# Patient Record
Sex: FEMALE | Race: WHITE | NOT HISPANIC OR LATINO
[De-identification: names, ages, dates, MRNs, and addresses within clinical notes are randomized per-mention and may not be internally consistent; named-entity substitution may affect disease eponyms.]

---

## 2017-01-05 ENCOUNTER — RX RENEWAL (OUTPATIENT)
Age: 62
End: 2017-01-05

## 2017-02-06 ENCOUNTER — TRANSCRIPTION ENCOUNTER (OUTPATIENT)
Age: 62
End: 2017-02-06

## 2017-03-20 ENCOUNTER — APPOINTMENT (OUTPATIENT)
Dept: FAMILY MEDICINE | Facility: CLINIC | Age: 62
End: 2017-03-20

## 2017-03-20 VITALS
SYSTOLIC BLOOD PRESSURE: 130 MMHG | DIASTOLIC BLOOD PRESSURE: 80 MMHG | BODY MASS INDEX: 30.73 KG/M2 | HEIGHT: 62 IN | HEART RATE: 72 BPM | WEIGHT: 167 LBS

## 2017-03-20 DIAGNOSIS — Z23 ENCOUNTER FOR IMMUNIZATION: ICD-10-CM

## 2017-03-20 DIAGNOSIS — Z01.419 ENCOUNTER FOR GYNECOLOGICAL EXAMINATION (GENERAL) (ROUTINE) W/OUT ABNORMAL FINDINGS: ICD-10-CM

## 2017-03-20 DIAGNOSIS — Z12.4 ENCOUNTER FOR GYNECOLOGICAL EXAMINATION (GENERAL) (ROUTINE) W/OUT ABNORMAL FINDINGS: ICD-10-CM

## 2017-03-20 DIAGNOSIS — R89.9 UNSPECIFIED ABNORMAL FINDING IN SPECIMENS FROM OTHER ORGANS, SYSTEMS AND TISSUES: ICD-10-CM

## 2017-07-05 ENCOUNTER — RX RENEWAL (OUTPATIENT)
Age: 62
End: 2017-07-05

## 2017-12-04 ENCOUNTER — RX RENEWAL (OUTPATIENT)
Age: 62
End: 2017-12-04

## 2018-01-01 ENCOUNTER — RX RENEWAL (OUTPATIENT)
Age: 63
End: 2018-01-01

## 2018-02-01 ENCOUNTER — APPOINTMENT (OUTPATIENT)
Dept: FAMILY MEDICINE | Facility: CLINIC | Age: 63
End: 2018-02-01
Payer: COMMERCIAL

## 2018-02-01 VITALS
SYSTOLIC BLOOD PRESSURE: 140 MMHG | HEIGHT: 62 IN | DIASTOLIC BLOOD PRESSURE: 90 MMHG | WEIGHT: 171.25 LBS | HEART RATE: 80 BPM | BODY MASS INDEX: 31.51 KG/M2

## 2018-02-01 PROCEDURE — 99396 PREV VISIT EST AGE 40-64: CPT

## 2018-02-01 RX ORDER — IBUPROFEN 600 MG/1
600 TABLET, FILM COATED ORAL 3 TIMES DAILY
Qty: 1 | Refills: 1 | Status: DISCONTINUED | COMMUNITY
Start: 2017-03-20 | End: 2018-02-01

## 2018-02-07 RX ORDER — ESOMEPRAZOLE MAGNESIUM 40 MG/1
40 CAPSULE, DELAYED RELEASE ORAL
Qty: 90 | Refills: 2 | Status: DISCONTINUED | COMMUNITY
Start: 2017-12-04 | End: 2018-02-07

## 2018-02-20 LAB
ALBUMIN SERPL ELPH-MCNC: 4.2 G/DL
ALP BLD-CCNC: 68 U/L
ALT SERPL-CCNC: 22 U/L
ANION GAP SERPL CALC-SCNC: 13 MMOL/L
APPEARANCE: CLEAR
AST SERPL-CCNC: 23 U/L
BASOPHILS # BLD AUTO: 0.03 K/UL
BASOPHILS NFR BLD AUTO: 0.5 %
BILIRUB SERPL-MCNC: 0.3 MG/DL
BILIRUBIN URINE: NEGATIVE
BLOOD URINE: NEGATIVE
BUN SERPL-MCNC: 17 MG/DL
CALCIUM SERPL-MCNC: 8.9 MG/DL
CHLORIDE SERPL-SCNC: 106 MMOL/L
CHOLEST SERPL-MCNC: 200 MG/DL
CHOLEST/HDLC SERPL: 3.7 RATIO
CO2 SERPL-SCNC: 25 MMOL/L
COLOR: YELLOW
CREAT SERPL-MCNC: 0.61 MG/DL
EOSINOPHIL # BLD AUTO: 0.12 K/UL
EOSINOPHIL NFR BLD AUTO: 2.2 %
GLUCOSE QUALITATIVE U: NEGATIVE MG/DL
GLUCOSE SERPL-MCNC: 106 MG/DL
HBA1C MFR BLD HPLC: 5.5 %
HCT VFR BLD CALC: 42.7 %
HDLC SERPL-MCNC: 54 MG/DL
HGB BLD-MCNC: 13.9 G/DL
IMM GRANULOCYTES NFR BLD AUTO: 0 %
KETONES URINE: NEGATIVE
LDLC SERPL CALC-MCNC: 127 MG/DL
LEUKOCYTE ESTERASE URINE: NEGATIVE
LYMPHOCYTES # BLD AUTO: 1.61 K/UL
LYMPHOCYTES NFR BLD AUTO: 29.4 %
MAN DIFF?: NORMAL
MCHC RBC-ENTMCNC: 29.1 PG
MCHC RBC-ENTMCNC: 32.6 GM/DL
MCV RBC AUTO: 89.5 FL
MONOCYTES # BLD AUTO: 0.64 K/UL
MONOCYTES NFR BLD AUTO: 11.7 %
NEUTROPHILS # BLD AUTO: 3.08 K/UL
NEUTROPHILS NFR BLD AUTO: 56.2 %
NITRITE URINE: NEGATIVE
PH URINE: 7.5
PLATELET # BLD AUTO: 213 K/UL
POTASSIUM SERPL-SCNC: 4.6 MMOL/L
PROT SERPL-MCNC: 6.7 G/DL
PROTEIN URINE: NEGATIVE MG/DL
RBC # BLD: 4.77 M/UL
RBC # FLD: 13.2 %
SODIUM SERPL-SCNC: 144 MMOL/L
SPECIFIC GRAVITY URINE: 1.02
TRIGL SERPL-MCNC: 96 MG/DL
TSH SERPL-ACNC: 1.37 UIU/ML
UROBILINOGEN URINE: NEGATIVE MG/DL
WBC # FLD AUTO: 5.48 K/UL

## 2018-03-09 ENCOUNTER — APPOINTMENT (OUTPATIENT)
Dept: FAMILY MEDICINE | Facility: CLINIC | Age: 63
End: 2018-03-09
Payer: COMMERCIAL

## 2018-03-09 VITALS
OXYGEN SATURATION: 98 % | HEIGHT: 62 IN | TEMPERATURE: 99 F | WEIGHT: 167.38 LBS | BODY MASS INDEX: 30.8 KG/M2 | HEART RATE: 80 BPM | DIASTOLIC BLOOD PRESSURE: 70 MMHG | SYSTOLIC BLOOD PRESSURE: 110 MMHG

## 2018-03-09 DIAGNOSIS — H91.93 UNSPECIFIED HEARING LOSS, BILATERAL: ICD-10-CM

## 2018-03-09 PROCEDURE — 99215 OFFICE O/P EST HI 40 MIN: CPT

## 2018-03-16 ENCOUNTER — APPOINTMENT (OUTPATIENT)
Dept: FAMILY MEDICINE | Facility: CLINIC | Age: 63
End: 2018-03-16
Payer: COMMERCIAL

## 2018-03-30 ENCOUNTER — NON-APPOINTMENT (OUTPATIENT)
Age: 63
End: 2018-03-30

## 2018-03-30 ENCOUNTER — APPOINTMENT (OUTPATIENT)
Dept: FAMILY MEDICINE | Facility: CLINIC | Age: 63
End: 2018-03-30
Payer: COMMERCIAL

## 2018-03-30 VITALS
WEIGHT: 170.13 LBS | DIASTOLIC BLOOD PRESSURE: 82 MMHG | HEIGHT: 62 IN | BODY MASS INDEX: 31.31 KG/M2 | HEART RATE: 71 BPM | SYSTOLIC BLOOD PRESSURE: 140 MMHG

## 2018-03-30 PROCEDURE — 99215 OFFICE O/P EST HI 40 MIN: CPT | Mod: 25

## 2018-03-30 PROCEDURE — 93000 ELECTROCARDIOGRAM COMPLETE: CPT

## 2018-05-03 ENCOUNTER — RX RENEWAL (OUTPATIENT)
Age: 63
End: 2018-05-03

## 2018-09-07 ENCOUNTER — MEDICATION RENEWAL (OUTPATIENT)
Age: 63
End: 2018-09-07

## 2018-11-21 ENCOUNTER — APPOINTMENT (OUTPATIENT)
Dept: FAMILY MEDICINE | Facility: CLINIC | Age: 63
End: 2018-11-21
Payer: COMMERCIAL

## 2018-11-21 VITALS
WEIGHT: 169 LBS | HEIGHT: 62 IN | HEART RATE: 63 BPM | BODY MASS INDEX: 31.1 KG/M2 | DIASTOLIC BLOOD PRESSURE: 70 MMHG | SYSTOLIC BLOOD PRESSURE: 145 MMHG | OXYGEN SATURATION: 98 %

## 2018-11-21 PROCEDURE — 99213 OFFICE O/P EST LOW 20 MIN: CPT

## 2018-11-25 NOTE — HISTORY OF PRESENT ILLNESS
[FreeTextEntry1] : need MCA for ovaries removal sec to + genetic testing [de-identified] : has no new complaints\par chart reviewed \par patient has not done preop\par H/O HTN doesnot see any cardio

## 2018-11-27 ENCOUNTER — RX RENEWAL (OUTPATIENT)
Age: 63
End: 2018-11-27

## 2018-11-28 ENCOUNTER — APPOINTMENT (OUTPATIENT)
Dept: FAMILY MEDICINE | Facility: CLINIC | Age: 63
End: 2018-11-28
Payer: COMMERCIAL

## 2018-11-28 ENCOUNTER — RX RENEWAL (OUTPATIENT)
Age: 63
End: 2018-11-28

## 2018-11-28 VITALS
SYSTOLIC BLOOD PRESSURE: 122 MMHG | OXYGEN SATURATION: 98 % | BODY MASS INDEX: 31.72 KG/M2 | HEART RATE: 64 BPM | HEIGHT: 61 IN | WEIGHT: 168 LBS | DIASTOLIC BLOOD PRESSURE: 84 MMHG

## 2018-11-28 PROCEDURE — 99215 OFFICE O/P EST HI 40 MIN: CPT

## 2018-11-28 NOTE — ASSESSMENT
[Patient Optimized for Surgery] : Patient optimized for surgery [No Further Testing Recommended] : no further testing recommended [Continue medications as is] : Continue current medications [As per surgery] : as per surgery [FreeTextEntry4] : patient is stable with HTN/Hyperlipedemia

## 2018-11-28 NOTE — HISTORY OF PRESENT ILLNESS
[No Pertinent Cardiac History] : no history of aortic stenosis, atrial fibrillation, coronary artery disease, recent myocardial infarction, or implantable device/pacemaker [No Pertinent Pulmonary History] : no history of asthma, COPD, sleep apnea, or smoking [No Adverse Anesthesia Reaction] : no adverse anesthesia reaction in self or family member [Chronic Anticoagulation] : no chronic anticoagulation [Chronic Kidney Disease] : no chronic kidney disease [Diabetes] : no diabetes [(Patient denies any chest pain, claudication, dyspnea on exertion, orthopnea, palpitations or syncope)] : Patient denies any chest pain, claudication, dyspnea on exertion, orthopnea, palpitations or syncope [FreeTextEntry1] : elective oppherectomy and salpigectomy [FreeTextEntry2] : 12/04/2018 [FreeTextEntry4] : H/O HTN/ Hyperlipidemia controlled\par Depression Under control\par going for elective oophorectomy and salpingectomy

## 2019-02-16 ENCOUNTER — RX RENEWAL (OUTPATIENT)
Age: 64
End: 2019-02-16

## 2019-04-05 ENCOUNTER — MEDICATION RENEWAL (OUTPATIENT)
Age: 64
End: 2019-04-05

## 2019-04-05 ENCOUNTER — APPOINTMENT (OUTPATIENT)
Dept: FAMILY MEDICINE | Facility: CLINIC | Age: 64
End: 2019-04-05
Payer: COMMERCIAL

## 2019-04-05 VITALS
DIASTOLIC BLOOD PRESSURE: 62 MMHG | SYSTOLIC BLOOD PRESSURE: 118 MMHG | HEART RATE: 68 BPM | HEIGHT: 61 IN | TEMPERATURE: 98 F | OXYGEN SATURATION: 98 % | BODY MASS INDEX: 31.72 KG/M2 | RESPIRATION RATE: 16 BRPM | WEIGHT: 168 LBS

## 2019-04-05 PROCEDURE — 36415 COLL VENOUS BLD VENIPUNCTURE: CPT

## 2019-04-05 PROCEDURE — 99214 OFFICE O/P EST MOD 30 MIN: CPT | Mod: 25

## 2019-04-05 PROCEDURE — 99396 PREV VISIT EST AGE 40-64: CPT | Mod: 25

## 2019-04-05 RX ORDER — POLYETHYLENE GLYCOL 3350, SODIUM SULFATE, SODIUM CHLORIDE, POTASSIUM CHLORIDE, ASCORBIC ACID, SODIUM ASCORBATE 7.5-2.691G
100 KIT ORAL
Qty: 1 | Refills: 0 | Status: DISCONTINUED | COMMUNITY
Start: 2018-02-26 | End: 2019-04-05

## 2019-04-05 RX ORDER — AMOXICILLIN AND CLAVULANATE POTASSIUM 875; 125 MG/1; MG/1
875-125 TABLET, COATED ORAL
Qty: 20 | Refills: 0 | Status: DISCONTINUED | COMMUNITY
Start: 2018-03-09 | End: 2019-04-05

## 2019-04-05 NOTE — PAST MEDICAL HISTORY
[Postmenopausal] : history of menopause having occurred [Menopause Age____] : age at menopause was [unfilled] [Total Preg ___] : G: [unfilled] [Live Births___] : P: [unfilled] [AB Spont ___] : miscarriage(s): [unfilled]

## 2019-04-05 NOTE — REASON FOR VISIT
[CPE] : a comprehensive physical exam [Initial Eval - Existing Diagnosis] : an initial evaluation of an existing diagnosis

## 2019-04-09 ENCOUNTER — TRANSCRIPTION ENCOUNTER (OUTPATIENT)
Age: 64
End: 2019-04-09

## 2019-04-09 LAB
ALBUMIN SERPL ELPH-MCNC: 4.5 G/DL
ALP BLD-CCNC: 71 U/L
ALT SERPL-CCNC: 20 U/L
ANION GAP SERPL CALC-SCNC: 9 MMOL/L
AST SERPL-CCNC: 16 U/L
BASOPHILS # BLD AUTO: 0.06 K/UL
BASOPHILS NFR BLD AUTO: 0.8 %
BILIRUB SERPL-MCNC: 0.2 MG/DL
BUN SERPL-MCNC: 18 MG/DL
CALCIUM SERPL-MCNC: 9.1 MG/DL
CHLORIDE SERPL-SCNC: 103 MMOL/L
CHOLEST SERPL-MCNC: 224 MG/DL
CHOLEST/HDLC SERPL: 4.6 RATIO
CO2 SERPL-SCNC: 27 MMOL/L
CREAT SERPL-MCNC: 0.98 MG/DL
EOSINOPHIL # BLD AUTO: 0.14 K/UL
EOSINOPHIL NFR BLD AUTO: 1.8 %
ESTIMATED AVERAGE GLUCOSE: 117 MG/DL
GLUCOSE SERPL-MCNC: 117 MG/DL
HBA1C MFR BLD HPLC: 5.7 %
HCT VFR BLD CALC: 42.6 %
HDLC SERPL-MCNC: 49 MG/DL
HGB BLD-MCNC: 13.6 G/DL
IMM GRANULOCYTES NFR BLD AUTO: 0.1 %
LDLC SERPL CALC-MCNC: 147 MG/DL
LYMPHOCYTES # BLD AUTO: 1.8 K/UL
LYMPHOCYTES NFR BLD AUTO: 23.5 %
MAN DIFF?: NORMAL
MCHC RBC-ENTMCNC: 29.3 PG
MCHC RBC-ENTMCNC: 31.9 GM/DL
MCV RBC AUTO: 91.8 FL
MONOCYTES # BLD AUTO: 0.62 K/UL
MONOCYTES NFR BLD AUTO: 8.1 %
NEUTROPHILS # BLD AUTO: 5.04 K/UL
NEUTROPHILS NFR BLD AUTO: 65.7 %
PLATELET # BLD AUTO: 217 K/UL
POTASSIUM SERPL-SCNC: 4.5 MMOL/L
PROT SERPL-MCNC: 6.7 G/DL
RBC # BLD: 4.64 M/UL
RBC # FLD: 12.4 %
SODIUM SERPL-SCNC: 139 MMOL/L
TRIGL SERPL-MCNC: 139 MG/DL
TSH SERPL-ACNC: 1.72 UIU/ML
WBC # FLD AUTO: 7.67 K/UL

## 2019-04-10 NOTE — PHYSICAL EXAM
[General Appearance - Alert] : alert [Sclera] : the sclera and conjunctiva were normal [Oropharynx] : the oropharynx was normal [Thyroid Diffuse Enlargement] : the thyroid was not enlarged [Respiration, Rhythm And Depth] : normal respiratory rhythm and effort [Auscultation Breath Sounds / Voice Sounds] : lungs were clear to auscultation bilaterally [Heart Rate And Rhythm] : heart rate was normal and rhythm regular [Heart Sounds] : normal S1 and S2 [Full Pulse] : the pedal pulses are present [Edema] : there was no peripheral edema [Veins - Varicosity Changes] : there were no varicosital changes [Abdomen Soft] : soft [Abdomen Tenderness] : non-tender [Cervical Lymph Nodes Enlarged Posterior Bilaterally] : posterior cervical [Cervical Lymph Nodes Enlarged Anterior Bilaterally] : anterior cervical [Supraclavicular Lymph Nodes Enlarged Bilaterally] : supraclavicular [No Spinal Tenderness] : no spinal tenderness [Abnormal Walk] : normal gait [Involuntary Movements] : no involuntary movements were seen [Musculoskeletal - Swelling] : no joint swelling seen [Skin Turgor] : normal skin turgor [] : no rash [Deep Tendon Reflexes (DTR)] : deep tendon reflexes were 2+ and symmetric [No Focal Deficits] : no focal deficits [Oriented To Time, Place, And Person] : oriented to person, place, and time [FreeTextEntry1] : no limited ROM

## 2019-04-10 NOTE — ASSESSMENT
[Obese (BMI >29.9)] : Obese - BMI >29.9 [Weight loss counseling given] : Weight loss counseling given [150 min/wk aerobic activity @ 60% MHR recommended] : 150 min/wk aerobic activity @ 60% MHR recommended [Mediterranean diet recommended] : Mediterranean diet recommended [Non - Smoker] : non-smoker [FreeTextEntry2] : she tries to go to GYM and strategies to achieve in the wrkplace reviewed.  [FreeTextEntry1] :  Well exam for 63   year old WF with PMH as stated in HPI / active list. \par \par Management : \par \par Advised IBUPROFEN TID with food for 3 days ; if back pain improving, continue for up to onw [place].\par Stretching and moist heat. \par \par \par See HPI and Plan\par \par Labs in office today.   Will advise. \par \par Best wishes offered !\par

## 2019-04-10 NOTE — HISTORY OF PRESENT ILLNESS
[Health Maintenance] : health maintenance [GYN Evaluation] : gynecology [___ Month(s) Ago] : [unfilled] month(s) ago [Spouse] : spouse [] :  [Working Full Time] : working full time [Never Smoked Cigarettes] : has never smoked cigarettes [Occasional Use] : occasional alcohol use [Good] : good [Reg. Dental Visits] : She has regular dental visits [Vision Problems] : She complains of vision problems [Hearing Loss] : She has hearing loss [Healthy Diet] : She consumes a diverse and healthy diet [Weight Concerns] : She does not have any weight concerns [Regular Exercise] : She does not exercise regularly [de-identified] : Dr. Jaimes [de-identified] : has March Appt.  [de-identified] : [place] [facility].  [de-identified] : \par Mammo June 2016  Bi Rads 2\par \par GI  Feb 22 for Endoscopy and colonoscopy   hx of Almanzar's  [de-identified] : Last seen Nov. 2018 for MCL for ELECTIVE oophorectomy and salpingectomy after genetic testing and FH of uterine cancer. \par Endorses "no problems".\par \par Today reports feeling well but in recent week has had some NOS LBP with no inciting injury noted.\par Pain is aching and sometimes sharp after sitting.\par NO radicular or Alarm features. [FreeTextEntry1] : \par In review: Jan 2016\par Sara presents to establish care being referred to me by her insurance company.\par She is a pleasant, 60-year-old female with past medical history significant for congenital hearing loss.  Presently is wearing cochlear implants since 2009.  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  \par \par \par HM:  Colonoscopy  April 2015\par         Mammo           2015   BAB \par \par Needs referral for DERM and GYN \par \par Social:   ;  three young adult children.    presently in school to become

## 2019-04-10 NOTE — HEALTH RISK ASSESSMENT
[Very Good] : ~his/her~  mood as very good [No falls in past year] : Patient reported no falls in the past year [0] : 2) Feeling down, depressed, or hopeless: Not at all (0) [Patient reported mammogram was normal] : Patient reported mammogram was normal [Patient reported colonoscopy was normal] : Patient reported colonoscopy was normal [Employed] : employed [] :  [# Of Children ___] : has [unfilled] children [Feels Safe at Home] : Feels safe at home [Fully functional (bathing, dressing, toileting, transferring, walking, feeding)] : Fully functional (bathing, dressing, toileting, transferring, walking, feeding) [Fully functional (using the telephone, shopping, preparing meals, housekeeping, doing laundry, using] : Fully functional and needs no help or supervision to perform IADLs (using the telephone, shopping, preparing meals, housekeeping, doing laundry, using transportation, managing medications and managing finances) [Reports changes in hearing] : Reports changes in hearing [Smoke Detector] : smoke detector [Carbon Monoxide Detector] : carbon monoxide detector [Seat Belt] :  uses seat belt [FreeTextEntry1] : BACK Spasm and mid back pain  [de-identified] : GYN  Dr. Jaimes  [] : No [de-identified] :  gardening   [place]ALKS [place] ALL DAY  [[id number]] : 0 [de-identified] :  endorses healthy eating  [Change in mental status noted] : No change in mental status noted [None] : None [With Significant Other] : lives with significant other [Reports changes in vision] : Reports no changes in vision [Guns at Home] : no guns at home [Travel to Developing Areas] : does not  travel to developing areas [MammogramComments] : Debby  [MammogramDate] : March 2019  [ColonoscopyDate] : May 2018  [FreeTextEntry2] :  at [facility]

## 2019-04-10 NOTE — HISTORY OF PRESENT ILLNESS
[Health Maintenance] : health maintenance [GYN Evaluation] : gynecology [___ Month(s) Ago] : [unfilled] month(s) ago [Spouse] : spouse [] :  [Working Full Time] : working full time [Never Smoked Cigarettes] : has never smoked cigarettes [Occasional Use] : occasional alcohol use [Good] : good [Reg. Dental Visits] : She has regular dental visits [Vision Problems] : She complains of vision problems [Hearing Loss] : She has hearing loss [Healthy Diet] : She consumes a diverse and healthy diet [Weight Concerns] : She does not have any weight concerns [Regular Exercise] : She does not exercise regularly [de-identified] : Dr. Jaimes [de-identified] : has March Appt.  [de-identified] : [place] [facility].  [de-identified] : \par Mammo June 2016  Bi Rads 2\par \par GI  Feb 22 for Endoscopy and colonoscopy   hx of Almanzar's  [de-identified] : Last seen Nov. 2018 for MCL for ELECTIVE oophorectomy and salpingectomy after genetic testing and FH of uterine cancer. \par Endorses "no problems".\par \par Today reports feeling well but in recent week has had some NOS LBP with no inciting injury noted.\par Pain is aching and sometimes sharp after sitting.\par NO radicular or Alarm features. [FreeTextEntry1] : \par In review: Jan 2016\par Sara presents to establish care being referred to me by her insurance company.\par She is a pleasant, 60-year-old female with past medical history significant for congenital hearing loss.  Presently is wearing cochlear implants since 2009.  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  \par \par \par HM:  Colonoscopy  April 2015\par         Mammo           2015   BAB \par \par Needs referral for DERM and GYN \par \par Social:   ;  three young adult children.    presently in school to become

## 2019-04-10 NOTE — HEALTH RISK ASSESSMENT
[Very Good] : ~his/her~  mood as very good [No falls in past year] : Patient reported no falls in the past year [0] : 2) Feeling down, depressed, or hopeless: Not at all (0) [Patient reported mammogram was normal] : Patient reported mammogram was normal [Patient reported colonoscopy was normal] : Patient reported colonoscopy was normal [Employed] : employed [] :  [# Of Children ___] : has [unfilled] children [Feels Safe at Home] : Feels safe at home [Fully functional (bathing, dressing, toileting, transferring, walking, feeding)] : Fully functional (bathing, dressing, toileting, transferring, walking, feeding) [Fully functional (using the telephone, shopping, preparing meals, housekeeping, doing laundry, using] : Fully functional and needs no help or supervision to perform IADLs (using the telephone, shopping, preparing meals, housekeeping, doing laundry, using transportation, managing medications and managing finances) [Reports changes in hearing] : Reports changes in hearing [Smoke Detector] : smoke detector [Carbon Monoxide Detector] : carbon monoxide detector [Seat Belt] :  uses seat belt [FreeTextEntry1] : BACK Spasm and mid back pain  [] : No [de-identified] : GYN  Dr. Jaimes  [de-identified] :  gardening   WALKS [place] ALL DAY  [[id number]] : 0 [de-identified] :  endorses healthy eating  [Change in mental status noted] : No change in mental status noted [None] : None [With Significant Other] : lives with significant other [Reports changes in vision] : Reports no changes in vision [Guns at Home] : no guns at home [Travel to Developing Areas] : does not  travel to developing areas [MammogramDate] : March 2019  [MammogramComments] : Debby  [ColonoscopyDate] : May 2018  [FreeTextEntry2] :  at [facility]

## 2019-04-10 NOTE — ASSESSMENT
[Obese (BMI >29.9)] : Obese - BMI >29.9 [Weight loss counseling given] : Weight loss counseling given [150 min/wk aerobic activity @ 60% MHR recommended] : 150 min/wk aerobic activity @ 60% MHR recommended [Mediterranean diet recommended] : Mediterranean diet recommended [Non - Smoker] : non-smoker [FreeTextEntry2] : she tries to go to GYM and strategies to achieve in the wrkplace reviewed.  [FreeTextEntry1] :  Well exam for 63   year old WF with PMH as stated in HPI / active list. \par \par Management : \par \par Advised IBUPROFEN TID with food for 3 days ; if back pain improving, continue for up to onw [place].\par Stretching and moist heat. \par \par \par See HPI and Plan\par \par Labs in office today.   Will advise. \par \par Best wishes offered !\par

## 2019-04-26 ENCOUNTER — APPOINTMENT (OUTPATIENT)
Dept: DERMATOLOGY | Facility: CLINIC | Age: 64
End: 2019-04-26
Payer: COMMERCIAL

## 2019-04-26 PROCEDURE — 99203 OFFICE O/P NEW LOW 30 MIN: CPT

## 2019-05-10 ENCOUNTER — RX RENEWAL (OUTPATIENT)
Age: 64
End: 2019-05-10

## 2019-10-10 ENCOUNTER — RX RENEWAL (OUTPATIENT)
Age: 64
End: 2019-10-10

## 2019-10-11 ENCOUNTER — RX RENEWAL (OUTPATIENT)
Age: 64
End: 2019-10-11

## 2019-11-27 ENCOUNTER — RX RENEWAL (OUTPATIENT)
Age: 64
End: 2019-11-27

## 2019-12-19 ENCOUNTER — RX RENEWAL (OUTPATIENT)
Age: 64
End: 2019-12-19

## 2020-04-17 ENCOUNTER — APPOINTMENT (OUTPATIENT)
Dept: FAMILY MEDICINE | Facility: CLINIC | Age: 65
End: 2020-04-17
Payer: COMMERCIAL

## 2020-04-17 PROCEDURE — 99442: CPT

## 2020-04-17 NOTE — HISTORY OF PRESENT ILLNESS
[Home] : at home, [unfilled] , at the time of the visit. [Medical Office: ([facility])___] : at the medical office located in  [Patient] : the patient [Self] : self [Spouse] : spouse [de-identified] : telephone 4:32 - 4:43\par \par losartan, lipitor and zoloft.  last visit over a year.  \par last bp check with gi and was "good".  112/70\par feeling well.  just arthritis\par taking lipitor for cholesterol.  diet is "good".  works in garden.  walks around neighborhood.\par on zoloft for depression on zoloft.  ot down or depressed on medication.  able to concentrate on medication.  no suicidal/homicidal ideations or plans\par feeling well on current dosage

## 2020-05-05 ENCOUNTER — RX RENEWAL (OUTPATIENT)
Age: 65
End: 2020-05-05

## 2020-06-29 ENCOUNTER — RX RENEWAL (OUTPATIENT)
Age: 65
End: 2020-06-29

## 2020-09-24 ENCOUNTER — APPOINTMENT (OUTPATIENT)
Dept: FAMILY MEDICINE | Facility: CLINIC | Age: 65
End: 2020-09-24
Payer: COMMERCIAL

## 2020-09-24 VITALS
SYSTOLIC BLOOD PRESSURE: 130 MMHG | BODY MASS INDEX: 30.4 KG/M2 | HEART RATE: 63 BPM | WEIGHT: 161 LBS | DIASTOLIC BLOOD PRESSURE: 60 MMHG | HEIGHT: 61 IN

## 2020-09-24 PROCEDURE — 99214 OFFICE O/P EST MOD 30 MIN: CPT

## 2020-09-28 LAB
ALBUMIN SERPL ELPH-MCNC: 4 G/DL
ALP BLD-CCNC: 78 U/L
ALT SERPL-CCNC: 14 U/L
ANION GAP SERPL CALC-SCNC: 11 MMOL/L
AST SERPL-CCNC: 19 U/L
BASOPHILS # BLD AUTO: 0.05 K/UL
BASOPHILS NFR BLD AUTO: 1.1 %
BILIRUB SERPL-MCNC: 0.4 MG/DL
BUN SERPL-MCNC: 18 MG/DL
CALCIUM SERPL-MCNC: 8.8 MG/DL
CHLORIDE SERPL-SCNC: 106 MMOL/L
CHOLEST SERPL-MCNC: 211 MG/DL
CHOLEST/HDLC SERPL: 4.2 RATIO
CO2 SERPL-SCNC: 27 MMOL/L
CREAT SERPL-MCNC: 0.69 MG/DL
EOSINOPHIL # BLD AUTO: 0.13 K/UL
EOSINOPHIL NFR BLD AUTO: 2.9 %
ESTIMATED AVERAGE GLUCOSE: 117 MG/DL
FOLATE SERPL-MCNC: 10.9 NG/ML
GLUCOSE SERPL-MCNC: 107 MG/DL
HBA1C MFR BLD HPLC: 5.7 %
HCT VFR BLD CALC: 42.5 %
HDLC SERPL-MCNC: 50 MG/DL
HGB BLD-MCNC: 13.4 G/DL
IMM GRANULOCYTES NFR BLD AUTO: 0.4 %
LDLC SERPL CALC-MCNC: 144 MG/DL
LYMPHOCYTES # BLD AUTO: 1.45 K/UL
LYMPHOCYTES NFR BLD AUTO: 32.4 %
MAN DIFF?: NORMAL
MCHC RBC-ENTMCNC: 28.2 PG
MCHC RBC-ENTMCNC: 31.5 GM/DL
MCV RBC AUTO: 89.5 FL
MONOCYTES # BLD AUTO: 0.44 K/UL
MONOCYTES NFR BLD AUTO: 9.8 %
NEUTROPHILS # BLD AUTO: 2.38 K/UL
NEUTROPHILS NFR BLD AUTO: 53.4 %
PLATELET # BLD AUTO: 196 K/UL
POTASSIUM SERPL-SCNC: 4.4 MMOL/L
PROT SERPL-MCNC: 6.1 G/DL
RBC # BLD: 4.75 M/UL
RBC # FLD: 14.6 %
SODIUM SERPL-SCNC: 144 MMOL/L
TRIGL SERPL-MCNC: 84 MG/DL
TSH SERPL-ACNC: 1.41 UIU/ML
VIT B12 SERPL-MCNC: 525 PG/ML
WBC # FLD AUTO: 4.47 K/UL

## 2020-09-29 NOTE — HISTORY OF PRESENT ILLNESS
[FreeTextEntry1] : pt here for a follow-up. [de-identified] : Last seen for CPE in April 2019 and encounter reviewed.\par Today presents in FU requesting refill of PPI for chronic GERD and FH of gastric cancer and Barretts;\par GI 7/29 Consult reviewed.   Confirms 2011 she to has short segment of Barretts esophagus. Last EGD was 2018 and advised no dysplasia. FU in 3 years. PPI 40 mgs daily.\par \par Today she is requesting to decrease dose to 20 mgs as she is concerned re: side effects. \par \par Also needs refill for HTN

## 2020-09-29 NOTE — ASSESSMENT
[FreeTextEntry1] : Advised CPE and HM review;\par Advised Fu with GI re dosing change due to hx. of Barretts and FH of same. \par \par Refills provided.

## 2020-09-29 NOTE — REVIEW OF SYSTEMS
[Fever] : no fever [Fatigue] : no fatigue [Negative] : Psychiatric [FreeTextEntry7] : some change in appetite "eat less"

## 2020-09-29 NOTE — PHYSICAL EXAM
[No Acute Distress] : no acute distress [No JVD] : no jugular venous distention [No Respiratory Distress] : no respiratory distress  [No Accessory Muscle Use] : no accessory muscle use [Regular Rhythm] : with a regular rhythm [Normal S1, S2] : normal S1 and S2 [Soft] : abdomen soft [No Focal Deficits] : no focal deficits [Speech Grossly Normal] : speech grossly normal [Alert and Oriented x3] : oriented to person, place, and time [de-identified] : calm and engaging  [de-identified] : no epigastric tenderness

## 2020-10-16 ENCOUNTER — APPOINTMENT (OUTPATIENT)
Dept: FAMILY MEDICINE | Facility: CLINIC | Age: 65
End: 2020-10-16
Payer: COMMERCIAL

## 2020-10-16 VITALS
WEIGHT: 164 LBS | OXYGEN SATURATION: 98 % | BODY MASS INDEX: 30.96 KG/M2 | RESPIRATION RATE: 16 BRPM | TEMPERATURE: 97.8 F | SYSTOLIC BLOOD PRESSURE: 120 MMHG | DIASTOLIC BLOOD PRESSURE: 76 MMHG | HEIGHT: 61 IN | HEART RATE: 71 BPM

## 2020-10-16 DIAGNOSIS — M79.601 PAIN IN RIGHT ARM: ICD-10-CM

## 2020-10-16 PROCEDURE — 99214 OFFICE O/P EST MOD 30 MIN: CPT | Mod: 25

## 2020-10-16 PROCEDURE — 99396 PREV VISIT EST AGE 40-64: CPT | Mod: 25

## 2020-10-16 PROCEDURE — 81003 URINALYSIS AUTO W/O SCOPE: CPT | Mod: QW

## 2020-10-18 LAB
BILIRUB UR QL STRIP: NEGATIVE
CLARITY UR: CLEAR
COLLECTION METHOD: NORMAL
GLUCOSE UR-MCNC: NEGATIVE
HCG UR QL: 0.2 EU/DL
HGB UR QL STRIP.AUTO: NEGATIVE
KETONES UR-MCNC: NEGATIVE
LEUKOCYTE ESTERASE UR QL STRIP: NEGATIVE
NITRITE UR QL STRIP: NEGATIVE
PH UR STRIP: 7
PROT UR STRIP-MCNC: NEGATIVE
SP GR UR STRIP: 1.02

## 2020-10-19 PROBLEM — M79.601 PAIN OF RIGHT UPPER EXTREMITY: Status: RESOLVED | Noted: 2017-03-20 | Resolved: 2020-10-19

## 2020-10-19 NOTE — PHYSICAL EXAM
[Sclera] : the sclera and conjunctiva were normal [General Appearance - Alert] : alert [Oropharynx] : the oropharynx was normal [Respiration, Rhythm And Depth] : normal respiratory rhythm and effort [Auscultation Breath Sounds / Voice Sounds] : lungs were clear to auscultation bilaterally [Thyroid Diffuse Enlargement] : the thyroid was not enlarged [Heart Rate And Rhythm] : heart rate was normal and rhythm regular [Heart Sounds] : normal S1 and S2 [Veins - Varicosity Changes] : there were no varicosital changes [Edema] : there was no peripheral edema [Full Pulse] : the pedal pulses are present [Abdomen Tenderness] : non-tender [Abdomen Soft] : soft [Cervical Lymph Nodes Enlarged Posterior Bilaterally] : posterior cervical [Supraclavicular Lymph Nodes Enlarged Bilaterally] : supraclavicular [Cervical Lymph Nodes Enlarged Anterior Bilaterally] : anterior cervical [Abnormal Walk] : normal gait [No Spinal Tenderness] : no spinal tenderness [Involuntary Movements] : no involuntary movements were seen [Musculoskeletal - Swelling] : no joint swelling seen [Skin Turgor] : normal skin turgor [Deep Tendon Reflexes (DTR)] : deep tendon reflexes were 2+ and symmetric [] : no rash [No Focal Deficits] : no focal deficits [Oriented To Time, Place, And Person] : oriented to person, place, and time [FreeTextEntry1] : no limited ROM

## 2020-10-19 NOTE — PAST MEDICAL HISTORY
[Menopause Age____] : age at menopause was [unfilled] [Postmenopausal] : history of menopause having occurred [Total Preg ___] : G: [unfilled] [AB Spont ___] : miscarriage(s): [unfilled] [Live Births___] : P: [unfilled]

## 2020-10-19 NOTE — COUNSELING
[Activity counseling provided] : activity [de-identified] : the value of daily walk, 30 minutes 5 x's per week,  stressed with regard  to overall health, mental health and bone density\par

## 2020-10-19 NOTE — ASSESSMENT
[FreeTextEntry1] :  Well exam for 65 year old WF with PMH as stated in HPI / active list. \par \par Management : \par \par See HPI and Plan\par \par Labs in office today.   Will advise. \par \par Best wishes offered !\par

## 2020-10-19 NOTE — HEALTH RISK ASSESSMENT
[Yes] : Yes [Monthly or less (1 pt)] : Monthly or less (1 point) [1 or 2 (0 pts)] : 1 or 2 (0 points) [Never (0 pts)] : Never (0 points) [No] : In the past 12 months have you used drugs other than those required for medical reasons? No [0] : 2) Feeling down, depressed, or hopeless: Not at all (0) [Very Good] : ~his/her~  mood as very good [No falls in past year] : Patient reported no falls in the past year [Patient reported colonoscopy was normal] : Patient reported colonoscopy was normal [Patient reported mammogram was normal] : Patient reported mammogram was normal [None] : None [] :  [Employed] : employed [With Significant Other] : lives with significant other [# Of Children ___] : has [unfilled] children [Fully functional (bathing, dressing, toileting, transferring, walking, feeding)] : Fully functional (bathing, dressing, toileting, transferring, walking, feeding) [Feels Safe at Home] : Feels safe at home [Fully functional (using the telephone, shopping, preparing meals, housekeeping, doing laundry, using] : Fully functional and needs no help or supervision to perform IADLs (using the telephone, shopping, preparing meals, housekeeping, doing laundry, using transportation, managing medications and managing finances) [Reports changes in hearing] : Reports changes in hearing [Smoke Detector] : smoke detector [Carbon Monoxide Detector] : carbon monoxide detector [Seat Belt] :  uses seat belt [FreeTextEntry1] : BACK Spasm and mid back pain  [] : No [Audit-CScore] : 1 [de-identified] : GYN  Dr. Jaimes  [de-identified] :  endorses healthy eating  [de-identified] :  gardening   WALKS IN [place] ALL DAY  [[id number]] : 0 [Change in mental status noted] : No change in mental status noted [Reports changes in vision] : Reports no changes in vision [Guns at Home] : no guns at home [Travel to Developing Areas] : does not  travel to developing areas [MammogramComments] : Debby  will request  [MammogramDate] : March 2020   [ColonoscopyDate] : May 2018  [ColonoscopyComments] : FU in 5 years will request  [FreeTextEntry2] :  at [facility]

## 2020-10-19 NOTE — HISTORY OF PRESENT ILLNESS
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 \par Chronic GERD and know Barretts; follows with GI . Last endoscopy 2018; advised FU in 3 years. \par \par Otherwise has been well and \par Denies any recent ER visits/hospitalizations/ MVA's or MSK injuries.\par \par IN  review: Jan 2016\par Sara presents to establish care being referred to me by her insurance company.\par She is a pleasant, 60-year-old female with past medical history significant for congenital hearing loss. Presently is wearing cochlear implants since 2009. 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 \par

## 2020-12-22 ENCOUNTER — TRANSCRIPTION ENCOUNTER (OUTPATIENT)
Age: 65
End: 2020-12-22

## 2021-03-06 ENCOUNTER — RX RENEWAL (OUTPATIENT)
Age: 66
End: 2021-03-06

## 2021-03-25 ENCOUNTER — TRANSCRIPTION ENCOUNTER (OUTPATIENT)
Age: 66
End: 2021-03-25

## 2021-05-27 ENCOUNTER — APPOINTMENT (OUTPATIENT)
Dept: FAMILY MEDICINE | Facility: CLINIC | Age: 66
End: 2021-05-27
Payer: MEDICARE

## 2021-05-27 VITALS
BODY MASS INDEX: 30.58 KG/M2 | HEART RATE: 70 BPM | SYSTOLIC BLOOD PRESSURE: 130 MMHG | OXYGEN SATURATION: 98 % | HEIGHT: 61 IN | DIASTOLIC BLOOD PRESSURE: 70 MMHG | WEIGHT: 162 LBS

## 2021-05-27 DIAGNOSIS — K21.9 GASTRO-ESOPHAGEAL REFLUX DISEASE W/OUT ESOPHAGITIS: ICD-10-CM

## 2021-05-27 DIAGNOSIS — E78.5 HYPERLIPIDEMIA, UNSPECIFIED: ICD-10-CM

## 2021-05-27 PROCEDURE — 99215 OFFICE O/P EST HI 40 MIN: CPT | Mod: 25

## 2021-05-27 PROCEDURE — 99072 ADDL SUPL MATRL&STAF TM PHE: CPT

## 2021-05-27 PROCEDURE — 36415 COLL VENOUS BLD VENIPUNCTURE: CPT

## 2021-05-30 ENCOUNTER — TRANSCRIPTION ENCOUNTER (OUTPATIENT)
Age: 66
End: 2021-05-30

## 2021-05-30 LAB
ALBUMIN SERPL ELPH-MCNC: 4.4 G/DL
ALP BLD-CCNC: 77 U/L
ALT SERPL-CCNC: 20 U/L
ANION GAP SERPL CALC-SCNC: 11 MMOL/L
AST SERPL-CCNC: 19 U/L
BASOPHILS # BLD AUTO: 0.05 K/UL
BASOPHILS NFR BLD AUTO: 0.9 %
BILIRUB SERPL-MCNC: 0.4 MG/DL
BUN SERPL-MCNC: 21 MG/DL
CALCIUM SERPL-MCNC: 9 MG/DL
CHLORIDE SERPL-SCNC: 104 MMOL/L
CHOLEST SERPL-MCNC: 183 MG/DL
CO2 SERPL-SCNC: 25 MMOL/L
CREAT SERPL-MCNC: 0.73 MG/DL
EOSINOPHIL # BLD AUTO: 0.09 K/UL
EOSINOPHIL NFR BLD AUTO: 1.6 %
GLUCOSE SERPL-MCNC: 117 MG/DL
HCT VFR BLD CALC: 35.9 %
HDLC SERPL-MCNC: 56 MG/DL
HGB BLD-MCNC: 10.4 G/DL
IMM GRANULOCYTES NFR BLD AUTO: 0.4 %
LDLC SERPL CALC-MCNC: 114 MG/DL
LYMPHOCYTES # BLD AUTO: 1.42 K/UL
LYMPHOCYTES NFR BLD AUTO: 25.9 %
MAN DIFF?: NORMAL
MCHC RBC-ENTMCNC: 23.9 PG
MCHC RBC-ENTMCNC: 29 GM/DL
MCV RBC AUTO: 82.3 FL
MONOCYTES # BLD AUTO: 0.52 K/UL
MONOCYTES NFR BLD AUTO: 9.5 %
NEUTROPHILS # BLD AUTO: 3.39 K/UL
NEUTROPHILS NFR BLD AUTO: 61.7 %
NONHDLC SERPL-MCNC: 128 MG/DL
PLATELET # BLD AUTO: 245 K/UL
POTASSIUM SERPL-SCNC: 4.3 MMOL/L
PROT SERPL-MCNC: 6.5 G/DL
RBC # BLD: 4.36 M/UL
RBC # FLD: 15.5 %
SODIUM SERPL-SCNC: 139 MMOL/L
TRIGL SERPL-MCNC: 71 MG/DL
WBC # FLD AUTO: 5.49 K/UL

## 2021-05-30 NOTE — ASSESSMENT
[Patient Optimized for Surgery] : Patient optimized for surgery [FreeTextEntry4] :  At this time, I see no absolute  contraindication for proposed procedure as she is vaccinated as well.  \par Reviewed, and advised no aspirin, NSAIDs, fish oil vitamin E. one week prior to procedure.\par If you take BP medication, take the AM of surgery with small sip of water. \par Advised to ensure normal BM day prior to surgery.\par Strategies to achieve reviewed. \par \par Best wishes offered.\par

## 2021-05-30 NOTE — ADDENDUM
[FreeTextEntry1] : I, Emi Edwards acting as a scribe for Dr. Neli Nash MD on 05/27/2021 at 2:03 PM.

## 2021-05-30 NOTE — PLAN
[FreeTextEntry1] : Pre-Op for 65 year old F with PMH as stated in HPI / active list. \par \par Management : \par \par See HPI and Plan\par \par Labs and EKG in office today.  Will advise. \par \par Best wishes offered !

## 2021-05-30 NOTE — END OF VISIT
[FreeTextEntry3] : Medical record entries made by the scribe today, were at my direction and personally dictated to them by me, Dr. Neli Nash on 05/27/2021. I have reviewed the chart and agree that the record accurately reflects my personal performance of the history, physical exam, assessment and plan.

## 2021-05-30 NOTE — HISTORY OF PRESENT ILLNESS
[No Pertinent Cardiac History] : no history of aortic stenosis, atrial fibrillation, coronary artery disease, recent myocardial infarction, or implantable device/pacemaker [No Pertinent Pulmonary History] : no history of asthma, COPD, sleep apnea, or smoking [(Patient denies any chest pain, claudication, dyspnea on exertion, orthopnea, palpitations or syncope)] : Patient denies any chest pain, claudication, dyspnea on exertion, orthopnea, palpitations or syncope [Moderate (4-6 METs)] : Moderate (4-6 METs) [Chronic Anticoagulation] : no chronic anticoagulation [Chronic Kidney Disease] : no chronic kidney disease [Diabetes] : no diabetes [FreeTextEntry1] : Endoscopy  [FreeTextEntry2] : 06/16 [FreeTextEntry3] : Dr. Norwood [FreeTextEntry4] : 66 y/o F presents today for a Pre-Op regarding her Endoscopy on 06/16/2021. \par Patient has received the COVID-19 vaccine. \par \par PMH significant for  HEARING LOSS and wear hearing aides;  GERD at  risk for Barretts due to FH; HTN /HLD well controlled.

## 2021-06-25 ENCOUNTER — RX RENEWAL (OUTPATIENT)
Age: 66
End: 2021-06-25

## 2021-07-02 ENCOUNTER — TRANSCRIPTION ENCOUNTER (OUTPATIENT)
Age: 66
End: 2021-07-02

## 2021-10-03 ENCOUNTER — RX RENEWAL (OUTPATIENT)
Age: 66
End: 2021-10-03

## 2021-12-13 ENCOUNTER — RX RENEWAL (OUTPATIENT)
Age: 66
End: 2021-12-13

## 2021-12-31 ENCOUNTER — RX RENEWAL (OUTPATIENT)
Age: 66
End: 2021-12-31

## 2022-01-25 ENCOUNTER — FORM ENCOUNTER (OUTPATIENT)
Age: 67
End: 2022-01-25

## 2022-02-01 ENCOUNTER — TRANSCRIPTION ENCOUNTER (OUTPATIENT)
Age: 67
End: 2022-02-01

## 2022-02-08 ENCOUNTER — FORM ENCOUNTER (OUTPATIENT)
Age: 67
End: 2022-02-08

## 2022-02-08 ENCOUNTER — NON-APPOINTMENT (OUTPATIENT)
Age: 67
End: 2022-02-08

## 2022-02-09 ENCOUNTER — APPOINTMENT (OUTPATIENT)
Dept: GYNECOLOGIC ONCOLOGY | Facility: CLINIC | Age: 67
End: 2022-02-09
Payer: MEDICARE

## 2022-02-09 DIAGNOSIS — Z80.3 FAMILY HISTORY OF MALIGNANT NEOPLASM OF BREAST: ICD-10-CM

## 2022-02-09 DIAGNOSIS — Z86.69 PERSONAL HISTORY OF OTHER DISEASES OF THE NERVOUS SYSTEM AND SENSE ORGANS: ICD-10-CM

## 2022-02-09 DIAGNOSIS — Z78.9 OTHER SPECIFIED HEALTH STATUS: ICD-10-CM

## 2022-02-09 PROCEDURE — 76830 TRANSVAGINAL US NON-OB: CPT | Mod: 59

## 2022-02-09 PROCEDURE — 76857 US EXAM PELVIC LIMITED: CPT | Mod: 59

## 2022-02-09 PROCEDURE — 99204 OFFICE O/P NEW MOD 45 MIN: CPT | Mod: 25

## 2022-02-10 NOTE — HISTORY OF PRESENT ILLNESS
[FreeTextEntry1] : 67yo  LMP age 56 presents today for second opinion regarding pelvic mass. 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 Patient was negative for BRCA. CT A/P performed 22 revealing multiple lobular soft tissue masses involving  the omentum most pronounced on the R. side adjacent to ascending colon and cecum. Additionally nodularity adj to tip of cecum involving peritoneum. 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 Cannot exclude peritoneal or GI malignancy. She is planned for Exploratory laparotomy excision of mass with Dr. Avelar. (Oncology) on 2/15/22. Patient reports constipation since 2019 for which she takes immodium. She admits to occasional reflux when eating large meals. She denies any vomiting, abdominal fullness, early satiet, bloody stools, pelvic pain, VB and/or issues with bladder habits. \par \par \par  from 22 was elevated at 211\par \par LPAP- 2021, normal per pt\par LMammo- 2021, normal per pt\par LColonoscopy- May 2019, normal per pt\par LBone Density Scan- Oct 2021, normal per pt\par

## 2022-02-10 NOTE — PHYSICAL EXAM
[Normal] : Bimanual Exam: Normal [de-identified] : Patient was interviewed and examined with chaperone present. Name of Chaperone: Rehana Orozco PA-C

## 2022-02-10 NOTE — REVIEW OF SYSTEMS
[Negative] : Musculoskeletal [Bloody Stools] : no bloody stools [Diarrhea] : no diarrhea [Nausea] : no nausea/vomitting [de-identified] : reflux

## 2022-02-10 NOTE — END OF VISIT
[FreeTextEntry3] : Written by Julianna Orozco PA-C, acting as a scribe for Dr. Jone Flores.\par  [FreeTextEntry2] : This note accurately reflects the work and decisions made by me.\par

## 2022-02-10 NOTE — ASSESSMENT
[FreeTextEntry1] : 65yo with Abd/pelvic mass.\par \par I discussed with patient that I personally would recommend further testing be performed prior to proceeding with surgical planning. Although it would not be wrong to proceed with surgery first, Dr. Avelar and I just differ in opinion on management. I would like to get an IR biopsy of mass to confirm a diagnosis. Following these results I will be better able to place her in the hands of the correct team including the surgical team if warranted. Patient agrees and wishes to proceed with biopsy first.

## 2022-02-10 NOTE — CHIEF COMPLAINT
[FreeTextEntry1] : [facility]\par \par [facility] [phone] at [address] [place] [id number]\par

## 2022-02-11 ENCOUNTER — APPOINTMENT (OUTPATIENT)
Dept: FAMILY MEDICINE | Facility: CLINIC | Age: 67
End: 2022-02-11
Payer: COMMERCIAL

## 2022-02-11 VITALS
HEART RATE: 85 BPM | DIASTOLIC BLOOD PRESSURE: 80 MMHG | HEIGHT: 61 IN | RESPIRATION RATE: 16 BRPM | BODY MASS INDEX: 28.13 KG/M2 | TEMPERATURE: 98 F | SYSTOLIC BLOOD PRESSURE: 138 MMHG | OXYGEN SATURATION: 98 % | WEIGHT: 149 LBS

## 2022-02-11 DIAGNOSIS — Z01.818 ENCOUNTER FOR OTHER PREPROCEDURAL EXAMINATION: ICD-10-CM

## 2022-02-11 DIAGNOSIS — Z87.720 PERSONAL HISTORY OF (CORRECTED) CONGENITAL MALFORMATIONS OF EYE: ICD-10-CM

## 2022-02-11 PROCEDURE — 99214 OFFICE O/P EST MOD 30 MIN: CPT

## 2022-02-11 RX ORDER — CLOBETASOL PROPIONATE 0.5 MG/G
0.05 CREAM TOPICAL
Qty: 45 | Refills: 0 | Status: ACTIVE | COMMUNITY
Start: 2022-01-20

## 2022-02-16 ENCOUNTER — OUTPATIENT (OUTPATIENT)
Dept: OUTPATIENT SERVICES | Facility: HOSPITAL | Age: 67
LOS: 1 days | End: 2022-02-16
Payer: MEDICARE

## 2022-02-16 ENCOUNTER — RESULT REVIEW (OUTPATIENT)
Age: 67
End: 2022-02-16

## 2022-02-16 VITALS
DIASTOLIC BLOOD PRESSURE: 63 MMHG | OXYGEN SATURATION: 98 % | SYSTOLIC BLOOD PRESSURE: 131 MMHG | HEART RATE: 66 BPM | HEIGHT: 62 IN | RESPIRATION RATE: 16 BRPM | TEMPERATURE: 98 F | WEIGHT: 153 LBS

## 2022-02-16 DIAGNOSIS — R19.00 INTRA-ABDOMINAL AND PELVIC SWELLING, MASS AND LUMP, UNSPECIFIED SITE: ICD-10-CM

## 2022-02-16 LAB
ANION GAP SERPL CALC-SCNC: 10 MMOL/L — SIGNIFICANT CHANGE UP (ref 5–17)
APTT BLD: 34.5 SEC — SIGNIFICANT CHANGE UP (ref 27.5–35.5)
BUN SERPL-MCNC: 20.7 MG/DL — HIGH (ref 8–20)
CALCIUM SERPL-MCNC: 9.1 MG/DL — SIGNIFICANT CHANGE UP (ref 8.6–10.2)
CANCER AG19-9 SERPL-ACNC: 15 U/ML
CHLORIDE SERPL-SCNC: 105 MMOL/L — SIGNIFICANT CHANGE UP (ref 98–107)
CO2 SERPL-SCNC: 26 MMOL/L — SIGNIFICANT CHANGE UP (ref 22–29)
CREAT SERPL-MCNC: 0.59 MG/DL — SIGNIFICANT CHANGE UP (ref 0.5–1.3)
GLUCOSE SERPL-MCNC: 107 MG/DL — HIGH (ref 70–99)
HCT VFR BLD CALC: 38.6 % — SIGNIFICANT CHANGE UP (ref 34.5–45)
HGB BLD-MCNC: 12.2 G/DL — SIGNIFICANT CHANGE UP (ref 11.5–15.5)
INR BLD: 1 RATIO — SIGNIFICANT CHANGE UP (ref 0.88–1.16)
MCHC RBC-ENTMCNC: 26.2 PG — LOW (ref 27–34)
MCHC RBC-ENTMCNC: 31.6 GM/DL — LOW (ref 32–36)
MCV RBC AUTO: 82.8 FL — SIGNIFICANT CHANGE UP (ref 80–100)
PLATELET # BLD AUTO: 204 K/UL — SIGNIFICANT CHANGE UP (ref 150–400)
POTASSIUM SERPL-MCNC: 4.5 MMOL/L — SIGNIFICANT CHANGE UP (ref 3.5–5.3)
POTASSIUM SERPL-SCNC: 4.5 MMOL/L — SIGNIFICANT CHANGE UP (ref 3.5–5.3)
PROTHROM AB SERPL-ACNC: 11.6 SEC — SIGNIFICANT CHANGE UP (ref 10.6–13.6)
RBC # BLD: 4.66 M/UL — SIGNIFICANT CHANGE UP (ref 3.8–5.2)
RBC # FLD: 15.5 % — HIGH (ref 10.3–14.5)
SODIUM SERPL-SCNC: 140 MMOL/L — SIGNIFICANT CHANGE UP (ref 135–145)
WBC # BLD: 3.95 K/UL — SIGNIFICANT CHANGE UP (ref 3.8–10.5)
WBC # FLD AUTO: 3.95 K/UL — SIGNIFICANT CHANGE UP (ref 3.8–10.5)

## 2022-02-16 PROCEDURE — 76942 ECHO GUIDE FOR BIOPSY: CPT | Mod: 26

## 2022-02-16 PROCEDURE — 85027 COMPLETE CBC AUTOMATED: CPT

## 2022-02-16 PROCEDURE — 85730 THROMBOPLASTIN TIME PARTIAL: CPT

## 2022-02-16 PROCEDURE — 88305 TISSUE EXAM BY PATHOLOGIST: CPT

## 2022-02-16 PROCEDURE — 49180 BIOPSY ABDOMINAL MASS: CPT

## 2022-02-16 PROCEDURE — 88305 TISSUE EXAM BY PATHOLOGIST: CPT | Mod: 26

## 2022-02-16 PROCEDURE — 85610 PROTHROMBIN TIME: CPT

## 2022-02-16 PROCEDURE — 88341 IMHCHEM/IMCYTCHM EA ADD ANTB: CPT | Mod: 26

## 2022-02-16 PROCEDURE — 88342 IMHCHEM/IMCYTCHM 1ST ANTB: CPT

## 2022-02-16 PROCEDURE — 88342 IMHCHEM/IMCYTCHM 1ST ANTB: CPT | Mod: 26

## 2022-02-16 PROCEDURE — 76942 ECHO GUIDE FOR BIOPSY: CPT

## 2022-02-16 PROCEDURE — 88341 IMHCHEM/IMCYTCHM EA ADD ANTB: CPT

## 2022-02-16 PROCEDURE — 80048 BASIC METABOLIC PNL TOTAL CA: CPT

## 2022-02-16 PROCEDURE — 36415 COLL VENOUS BLD VENIPUNCTURE: CPT

## 2022-02-16 RX ORDER — ESOMEPRAZOLE MAGNESIUM 40 MG/1
1 CAPSULE, DELAYED RELEASE ORAL
Qty: 0 | Refills: 0 | DISCHARGE

## 2022-02-16 NOTE — ASU DISCHARGE PLAN (ADULT/PEDIATRIC) - NS MD DC FALL RISK RISK
For information on Fall & Injury Prevention, visit: [email or web address]/[email or web address] OR  [email or web address]/[email or web address] OR  https://www.cdc.gov/steadi/patient.html

## 2022-02-16 NOTE — ASU DISCHARGE PLAN (ADULT/PEDIATRIC) - ASU DC SPECIAL INSTRUCTIONSFT
Biopsy Discharge    Discharge Instructions  - You have had a biopsy of an omental nodule  - You may shower in 24 hours. No soaking or swimming until the site is completely healed.  - Keep the area covered and dry for the next 24 hours.  - Do not perform any heavy lifting for the next few days or until the site is healed.  - You may resume your normal diet.  - You may resume your normal medications however you should wait 48 hours before restarting aspirin, plavix, or blood thinners.  - It is normal to experience some pain over the site for the next few days. You may take apply ice to the area (20 minutes on, 20 minutes off) and take Tylenol for that pain. Do not take more frequently than every 6 hours and do not exceed more than 3000mg of Tylenol in a 24 hour period.    - You were given medication which may make you drowsy, therefore you need someone to stay with you until the morning following the procedure.  - Do not drive, engage in heavy lifting or strenuous activity, or drink any alcoholic beverages for the next 24 hours.   - You may resume normal activity in 24 hours.    Notify your primary physician and/or Interventional Radiology IMMEDIATELY if you experience any of the following       - Fever of 101F or 38C       - Chills or Rigors/ Shakes       - Swelling and/or Redness in the area around the biopsy site       - Worsening Pain       - Blood soaked bandages or worsening bleeding       - Lightheadedness and/or dizziness upon standing       - Chest Pain/ Tightness       - Shortness of Breath       - Difficulty walking    If you have a problem that you believe requires IMMEDIATE attention, please go to your NEAREST Emergency Room. If you believe your problem can safely wait until you speak to a physician, please call Interventional Radiology for any concerns.    During Normal Weekday Business Hours- You can contact the Interventional Radiology department during normal business hours via telephone.  During Evenings and Weekends- If you need to contact Interventional Radiology during off hours, do so by calling the hospital and requesting to be connected to the Interventional Radiologist on call.

## 2022-02-16 NOTE — PROGRESS NOTE ADULT - SUBJECTIVE AND OBJECTIVE BOX
IR Post Procedure Note    Diagnosis: Omental Lesion    Procedure: Omental nodule Biopsy    : John Arellano MD    Contrast: None    Anesthesia: 1% Lidocaine Subcutaneous, 50mcg fentanyl    Estimated Blood Loss: Less than 10cc    Specimens: Specimens identified, labeled, confirmed and sent to lab    Complications: No Immediate Complications    Anticoagulation: Resume in 48 Hours    Findings & Plan: 3 18g core bx of omental lesion obtained w Temno biopsy needle under US guidance. No hematoma or complications on post procedure US.      Please call Interventional Radiology with any questions, concerns, or issues.

## 2022-02-18 PROBLEM — Z01.818 PREOP TESTING: Status: RESOLVED | Noted: 2022-02-10 | Resolved: 2022-02-18

## 2022-02-18 NOTE — PHYSICAL EXAM
[No Acute Distress] : no acute distress [Normal Sclera/Conjunctiva] : normal sclera/conjunctiva [No JVD] : no jugular venous distention [No Respiratory Distress] : no respiratory distress  [No Accessory Muscle Use] : no accessory muscle use [Regular Rhythm] : with a regular rhythm [Normal S1, S2] : normal S1 and S2 [Soft] : abdomen soft [Non Tender] : non-tender [No Joint Swelling] : no joint swelling [No Focal Deficits] : no focal deficits [Alert and Oriented x3] : oriented to person, place, and time [Normal Insight/Judgement] : insight and judgment were intact [de-identified] : engaging  [de-identified] : NANY  [de-identified] : no tremors

## 2022-02-18 NOTE — HISTORY OF PRESENT ILLNESS
[FreeTextEntry3] :  [FreeTextEntry4] : [facility] \benjie pt is going for biopsy on 02/16/2022 pending procedure  [FreeTextEntry1] : To update me on her current health status:  [de-identified] : Last seen by me in MAY 2021 for MCL anticipating Endoscopy.  Surveillance or Barretts.  STABLE. \par \par In Nov. 2021 she had  annual GYN with Dr. Ulloa.\par TV US 11/19  abnormal EM thickening; uterine fibroids.  Of note she is S/P 2018 for abnormal genetic testing and FHX of breast and ovarian cancer ; noted for LEFT Adnexa complex cystic structure  possible extending from bowel loop.  \par 1/26 CT of Abd/Pelvis : Impression : findings suspicious for ovarian neoplasia. \par Was planning exploratory lap with excision with Dr. Avelar , however decided 2 nd opinion with Dr. Flores.\par He recommends IR biopsy of mass to confirm pathology  and +/- GI surgical consult as well.  \par \par MAMMO 11/ 19/21  Bi Rads 2 \par \par She agrees to plan. \par Feb 16 anticipating biopsy at [facility]. \par \par Ca 125 1/16 211\par

## 2022-02-18 NOTE — HISTORY OF PRESENT ILLNESS
[FreeTextEntry3] :  [FreeTextEntry4] : [facility] \benjie pt is going for biopsy on 02/16/2022 pending procedure  [FreeTextEntry1] : To update me on her current health status:  [de-identified] : Last seen by me in MAY 2021 for MCL anticipating Endoscopy.  Surveillance or Barretts.  STABLE. \par \par In Nov. 2021 she had  annual GYN with Dr. Ulloa.\par TV US 11/19  abnormal EM thickening; uterine fibroids.  Of note she is S/P 2018 for abnormal genetic testing and FHX of breast and ovarian cancer ; noted for LEFT Adnexa complex cystic structure  possible extending from bowel loop.  \par 1/26 CT of Abd/Pelvis : Impression : findings suspicious for ovarian neoplasia. \par Was planning exploratory lap with excision with Dr. Avelar , however decided 2 nd opinion with Dr. Flores.\par He recommends IR biopsy of mass to confirm pathology  and +/- GI surgical consult as well.  \par \par MAMMO 11/ 19/21  Bi Rads 2 \par \par She agrees to plan. \par Feb 16 anticipating biopsy at [facility]. \par \par Ca 125 1/16 211\par

## 2022-02-18 NOTE — REVIEW OF SYSTEMS
[Fatigue] : fatigue [Hearing Loss] : hearing loss [Anxiety] : anxiety [Negative] : Respiratory [Fever] : no fever [Night Sweats] : no night sweats [Abdominal Pain] : no abdominal pain [Vomiting] : no vomiting [Dysuria] : no dysuria [Hematuria] : no hematuria [Headache] : no headache [Memory Loss] : no memory loss [FreeTextEntry4] : congenital  [FreeTextEntry7] : decrease in appetite

## 2022-02-18 NOTE — ASSESSMENT
[FreeTextEntry1] : MUCH support rendered and time afforded to facilitate her understanding of plan/ management. \par \par Will follow closely with her and advised to RTO for MCL when needed.

## 2022-02-18 NOTE — PHYSICAL EXAM
[No Acute Distress] : no acute distress [Normal Sclera/Conjunctiva] : normal sclera/conjunctiva [No JVD] : no jugular venous distention [No Respiratory Distress] : no respiratory distress  [No Accessory Muscle Use] : no accessory muscle use [Regular Rhythm] : with a regular rhythm [Normal S1, S2] : normal S1 and S2 [Soft] : abdomen soft [Non Tender] : non-tender [No Joint Swelling] : no joint swelling [No Focal Deficits] : no focal deficits [Alert and Oriented x3] : oriented to person, place, and time [Normal Insight/Judgement] : insight and judgment were intact [de-identified] : engaging  [de-identified] : NANY  [de-identified] : no tremors

## 2022-02-22 LAB — SURGICAL PATHOLOGY STUDY: SIGNIFICANT CHANGE UP

## 2022-02-23 ENCOUNTER — APPOINTMENT (OUTPATIENT)
Dept: GYNECOLOGIC ONCOLOGY | Facility: CLINIC | Age: 67
End: 2022-02-23
Payer: MEDICARE

## 2022-02-23 LAB — CEA SERPL-MCNC: 4.1 NG/ML

## 2022-02-23 PROCEDURE — 99214 OFFICE O/P EST MOD 30 MIN: CPT

## 2022-02-24 ENCOUNTER — OUTPATIENT (OUTPATIENT)
Dept: OUTPATIENT SERVICES | Facility: HOSPITAL | Age: 67
LOS: 1 days | Discharge: HOME | End: 2022-02-24
Payer: MEDICARE

## 2022-02-24 DIAGNOSIS — Z12.73 ENCOUNTER FOR SCREENING FOR MALIGNANT NEOPLASM OF OVARY: ICD-10-CM

## 2022-02-25 ENCOUNTER — RESULT REVIEW (OUTPATIENT)
Age: 67
End: 2022-02-25

## 2022-02-28 ENCOUNTER — RESULT REVIEW (OUTPATIENT)
Age: 67
End: 2022-02-28

## 2022-02-28 ENCOUNTER — APPOINTMENT (OUTPATIENT)
Dept: HEMATOLOGY ONCOLOGY | Facility: CLINIC | Age: 67
End: 2022-02-28
Payer: MEDICARE

## 2022-02-28 VITALS
HEIGHT: 62 IN | DIASTOLIC BLOOD PRESSURE: 72 MMHG | WEIGHT: 154 LBS | SYSTOLIC BLOOD PRESSURE: 122 MMHG | HEART RATE: 65 BPM | BODY MASS INDEX: 28.34 KG/M2 | OXYGEN SATURATION: 98 %

## 2022-02-28 LAB
BASOPHILS # BLD AUTO: 0.1 K/UL — SIGNIFICANT CHANGE UP (ref 0–0.2)
BASOPHILS NFR BLD AUTO: 1.4 % — SIGNIFICANT CHANGE UP (ref 0–2)
EOSINOPHIL # BLD AUTO: 0.1 K/UL — SIGNIFICANT CHANGE UP (ref 0–0.5)
EOSINOPHIL NFR BLD AUTO: 2.1 % — SIGNIFICANT CHANGE UP (ref 0–6)
HCT VFR BLD CALC: 44 % — SIGNIFICANT CHANGE UP (ref 34.5–45)
HGB BLD-MCNC: 13.3 G/DL — SIGNIFICANT CHANGE UP (ref 11.5–15.5)
LYMPHOCYTES # BLD AUTO: 1.3 K/UL — SIGNIFICANT CHANGE UP (ref 1–3.3)
LYMPHOCYTES # BLD AUTO: 24 % — SIGNIFICANT CHANGE UP (ref 13–44)
MCHC RBC-ENTMCNC: 26.7 PG — LOW (ref 27–34)
MCHC RBC-ENTMCNC: 30.2 G/DL — LOW (ref 32–36)
MCV RBC AUTO: 88.3 FL — SIGNIFICANT CHANGE UP (ref 80–100)
MONOCYTES # BLD AUTO: 0.5 K/UL — SIGNIFICANT CHANGE UP (ref 0–0.9)
MONOCYTES NFR BLD AUTO: 9.9 % — SIGNIFICANT CHANGE UP (ref 2–14)
NEUTROPHILS # BLD AUTO: 3.3 K/UL — SIGNIFICANT CHANGE UP (ref 1.8–7.4)
NEUTROPHILS NFR BLD AUTO: 62.6 % — SIGNIFICANT CHANGE UP (ref 43–77)
PLATELET # BLD AUTO: 209 K/UL — SIGNIFICANT CHANGE UP (ref 150–400)
RBC # BLD: 4.98 M/UL — SIGNIFICANT CHANGE UP (ref 3.8–5.2)
RBC # FLD: 15.4 % — HIGH (ref 10.3–14.5)
SURGICAL PATHOLOGY STUDY: SIGNIFICANT CHANGE UP
WBC # BLD: 5.2 K/UL — SIGNIFICANT CHANGE UP (ref 3.8–10.5)
WBC # FLD AUTO: 5.2 K/UL — SIGNIFICANT CHANGE UP (ref 3.8–10.5)

## 2022-02-28 PROCEDURE — 93010 ELECTROCARDIOGRAM REPORT: CPT

## 2022-02-28 PROCEDURE — 99205 OFFICE O/P NEW HI 60 MIN: CPT

## 2022-02-28 NOTE — REASON FOR VISIT
[Spouse] : spouse [FreeTextEntry1] : [place] Location \par \par [facility] [place]. 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 \par -Negative for BRCA. \par -CT A/P 1/26/22 w/ multiple lobular soft tissue masses involving omentum most pronounced on the R. side adjacent to ascending colon and cecum. Additionally nodularity adj to tip of cecum involving peritoneum. 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 Cannot exclude peritoneal or GI malignancy.\par -Was planned for Ex-Lap excision of mass with  (oncology) 2/15/22. \par -Ca125 1/18/22 elevated 211 \par -Recommended IR bx of mass to confirm diagnosis. \par -CEA 4.1 Ca19-9 15 2/9/22. \par -F/u to review results and tx plan.

## 2022-02-28 NOTE — PHYSICAL EXAM
[Normal] : Mood and affect: Normal [FreeTextEntry1] : Xuan Shearer Medical assistant was present during results and discussion.

## 2022-02-28 NOTE — HISTORY OF PRESENT ILLNESS
[FreeTextEntry1] : 67yo presented on 2/9/22 for second opinion regarding pelvic mass. 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 Patient was negative for BRCA. CT A/P performed 1/26/22 revealing multiple lobular soft tissue masses involving the omentum most pronounced on the R. side adjacent to ascending colon and cecum. Additionally nodularity adj to tip of cecum involving peritoneum. 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 Cannot exclude peritoneal or GI malignancy. She was planned for Exploratory laparotomy excision of mass with Dr. Avelar. (Oncology) on 2/15/22. Patient reported constipation since 2019 for which she takes immodium. She admitted to occasional reflux when eating large meals. She denied any vomiting, abdominal fullness,  bloody stools, pelvic pain, VB and/or issues with bladder habits. \par \par  from 1/18/22 was elevated at 211\par \par I discussed w/ pt that I personally would recommend further testing be performed prior to proceeding with surgical planning. Although it would not be wrong to proceed with surgery first, Dr. Avelar and I just differ in opinion on management. I wanted to get an IR biopsy of mass to confirm a diagnosis. Following these results I would be better able to place her in the hands of the correct team including the surgical team if warranted. Patient agreed and wished to proceed with IR bx. \par \par CEA was 4.1. 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

## 2022-02-28 NOTE — ASSESSMENT
[FreeTextEntry1] : I discussed with patient and her  John that her biopsy is consistent with a gynecological cancer (at least Stage IIIC serous carcinoma). I discussed patients options which included neoadjuvant chemotherapy first vs surgical intervention first. I advised the patient that regardless she will need surgery and chemotherapy. I explained that her cancer is very close to her colon and if we proceed with surgery now, there is a risk of patient needing a colostomy (colon resection). I am recommending patient have chemotherapy first.. I explained that 80 percent of patients have a good response and are able to have surgery after. Patient understands that if she does not have a good response it is likely surgery wouldn’t have helped. Patient will have a consultation with our hem/onc at our [facility] as soon as possible. We will reevaluate patient after cycle 3 with a Ca125 and imaging. I briefly discussed the possibility of HIPEC with surgery after cycle 3 or cycle 6 depending on patients response to chemotherapy. Patient stated she understood and agreed to comply. \par \par Patients daughter Dr. Retana is a pathologist at [facility] and was called during patients visit. The patients families questions were answered.

## 2022-02-28 NOTE — END OF VISIT
[FreeTextEntry3] : Written by Xuan Shearer, acting as a scribe for Dr. Jone Flores \par This note accurately reflects the work and decisions made by me.

## 2022-03-01 ENCOUNTER — NON-APPOINTMENT (OUTPATIENT)
Age: 67
End: 2022-03-01

## 2022-03-01 LAB
ALBUMIN SERPL ELPH-MCNC: 4.6 G/DL
ALP BLD-CCNC: 86 U/L
ALT SERPL-CCNC: 18 U/L
ANION GAP SERPL CALC-SCNC: 13 MMOL/L
AST SERPL-CCNC: 24 U/L
BILIRUB SERPL-MCNC: 0.4 MG/DL
BUN SERPL-MCNC: 17 MG/DL
CALCIUM SERPL-MCNC: 9.3 MG/DL
CANCER AG125 SERPL-ACNC: 179 U/ML
CHLORIDE SERPL-SCNC: 104 MMOL/L
CO2 SERPL-SCNC: 24 MMOL/L
CREAT SERPL-MCNC: 0.64 MG/DL
EGFR: 98 ML/MIN/1.73M2
GLUCOSE SERPL-MCNC: 94 MG/DL
HAV IGM SER QL: NONREACTIVE
HBV CORE IGM SER QL: NONREACTIVE
HBV SURFACE AG SER QL: NONREACTIVE
HCV AB SER QL: NONREACTIVE
HCV S/CO RATIO: 0.17 S/CO
INR PPP: 1.03 RATIO
MAGNESIUM SERPL-MCNC: 2 MG/DL
POTASSIUM SERPL-SCNC: 4.5 MMOL/L
PROT SERPL-MCNC: 7 G/DL
PT BLD: 12.1 SEC
SODIUM SERPL-SCNC: 142 MMOL/L

## 2022-03-03 LAB — HE4X: 133 PMOL/L

## 2022-03-04 ENCOUNTER — APPOINTMENT (OUTPATIENT)
Dept: RADIATION ONCOLOGY | Facility: CLINIC | Age: 67
End: 2022-03-04

## 2022-03-05 ENCOUNTER — APPOINTMENT (OUTPATIENT)
Dept: CT IMAGING | Facility: CLINIC | Age: 67
End: 2022-03-05
Payer: MEDICARE

## 2022-03-05 ENCOUNTER — OUTPATIENT (OUTPATIENT)
Dept: OUTPATIENT SERVICES | Facility: HOSPITAL | Age: 67
LOS: 1 days | End: 2022-03-05

## 2022-03-05 ENCOUNTER — RESULT REVIEW (OUTPATIENT)
Age: 67
End: 2022-03-05

## 2022-03-05 DIAGNOSIS — C48.2 MALIGNANT NEOPLASM OF PERITONEUM, UNSPECIFIED: ICD-10-CM

## 2022-03-05 PROCEDURE — 71260 CT THORAX DX C+: CPT | Mod: 26

## 2022-03-07 ENCOUNTER — APPOINTMENT (OUTPATIENT)
Dept: HEMATOLOGY ONCOLOGY | Facility: CLINIC | Age: 67
End: 2022-03-07

## 2022-03-07 ENCOUNTER — RESULT REVIEW (OUTPATIENT)
Age: 67
End: 2022-03-07

## 2022-03-07 LAB
BASOPHILS # BLD AUTO: 0.1 K/UL — SIGNIFICANT CHANGE UP (ref 0–0.2)
BASOPHILS NFR BLD AUTO: 1.5 % — SIGNIFICANT CHANGE UP (ref 0–2)
EOSINOPHIL # BLD AUTO: 0.2 K/UL — SIGNIFICANT CHANGE UP (ref 0–0.5)
EOSINOPHIL NFR BLD AUTO: 3.1 % — SIGNIFICANT CHANGE UP (ref 0–6)
HCT VFR BLD CALC: 42.4 % — SIGNIFICANT CHANGE UP (ref 34.5–45)
HGB BLD-MCNC: 12.8 G/DL — SIGNIFICANT CHANGE UP (ref 11.5–15.5)
LYMPHOCYTES # BLD AUTO: 1.2 K/UL — SIGNIFICANT CHANGE UP (ref 1–3.3)
LYMPHOCYTES # BLD AUTO: 23 % — SIGNIFICANT CHANGE UP (ref 13–44)
MCHC RBC-ENTMCNC: 26.7 PG — LOW (ref 27–34)
MCHC RBC-ENTMCNC: 30 G/DL — LOW (ref 32–36)
MCV RBC AUTO: 88.7 FL — SIGNIFICANT CHANGE UP (ref 80–100)
MONOCYTES # BLD AUTO: 0.6 K/UL — SIGNIFICANT CHANGE UP (ref 0–0.9)
MONOCYTES NFR BLD AUTO: 12 % — SIGNIFICANT CHANGE UP (ref 2–14)
NEUTROPHILS # BLD AUTO: 3.1 K/UL — SIGNIFICANT CHANGE UP (ref 1.8–7.4)
NEUTROPHILS NFR BLD AUTO: 60.3 % — SIGNIFICANT CHANGE UP (ref 43–77)
PLATELET # BLD AUTO: 226 K/UL — SIGNIFICANT CHANGE UP (ref 150–400)
RBC # BLD: 4.78 M/UL — SIGNIFICANT CHANGE UP (ref 3.8–5.2)
RBC # FLD: 15.8 % — HIGH (ref 10.3–14.5)
WBC # BLD: 5.1 K/UL — SIGNIFICANT CHANGE UP (ref 3.8–10.5)
WBC # FLD AUTO: 5.1 K/UL — SIGNIFICANT CHANGE UP (ref 3.8–10.5)

## 2022-03-08 LAB
ALBUMIN SERPL ELPH-MCNC: 4.4 G/DL
ALP BLD-CCNC: 81 U/L
ALT SERPL-CCNC: 18 U/L
ANION GAP SERPL CALC-SCNC: 10 MMOL/L
AST SERPL-CCNC: 19 U/L
BILIRUB SERPL-MCNC: 0.3 MG/DL
BUN SERPL-MCNC: 14 MG/DL
CALCIUM SERPL-MCNC: 9 MG/DL
CHLORIDE SERPL-SCNC: 106 MMOL/L
CO2 SERPL-SCNC: 27 MMOL/L
CREAT SERPL-MCNC: 0.65 MG/DL
EGFR: 98 ML/MIN/1.73M2
GLUCOSE SERPL-MCNC: 97 MG/DL
MAGNESIUM SERPL-MCNC: 1.9 MG/DL
POTASSIUM SERPL-SCNC: 4.6 MMOL/L
PROT SERPL-MCNC: 6.4 G/DL
SODIUM SERPL-SCNC: 143 MMOL/L

## 2022-03-09 ENCOUNTER — RESULT REVIEW (OUTPATIENT)
Age: 67
End: 2022-03-09

## 2022-03-09 ENCOUNTER — APPOINTMENT (OUTPATIENT)
Age: 67
End: 2022-03-09

## 2022-03-09 LAB
APPEARANCE UR: CLEAR — SIGNIFICANT CHANGE UP
BILIRUB UR-MCNC: NEGATIVE — SIGNIFICANT CHANGE UP
COLOR SPEC: SIGNIFICANT CHANGE UP
DIFF PNL FLD: NEGATIVE — SIGNIFICANT CHANGE UP
GLUCOSE UR QL: NEGATIVE — SIGNIFICANT CHANGE UP
KETONES UR-MCNC: NEGATIVE — SIGNIFICANT CHANGE UP
LEUKOCYTE ESTERASE UR-ACNC: NEGATIVE — SIGNIFICANT CHANGE UP
NITRITE UR-MCNC: NEGATIVE — SIGNIFICANT CHANGE UP
PH UR: 6 — SIGNIFICANT CHANGE UP (ref 5–8)
PROT UR-MCNC: NEGATIVE — SIGNIFICANT CHANGE UP
SP GR SPEC: 1.02 — SIGNIFICANT CHANGE UP (ref 1.01–1.02)
UROBILINOGEN FLD QL: SIGNIFICANT CHANGE UP

## 2022-03-09 RX ORDER — ESOMEPRAZOLE MAGNESIUM 40 MG/1
1 CAPSULE, DELAYED RELEASE ORAL
Qty: 0 | Refills: 0 | DISCHARGE

## 2022-03-10 DIAGNOSIS — C48.2 MALIGNANT NEOPLASM OF PERITONEUM, UNSPECIFIED: ICD-10-CM

## 2022-03-10 DIAGNOSIS — Z51.11 ENCOUNTER FOR ANTINEOPLASTIC CHEMOTHERAPY: ICD-10-CM

## 2022-03-10 DIAGNOSIS — R11.2 NAUSEA WITH VOMITING, UNSPECIFIED: ICD-10-CM

## 2022-03-17 ENCOUNTER — APPOINTMENT (OUTPATIENT)
Dept: HEMATOLOGY ONCOLOGY | Facility: CLINIC | Age: 67
End: 2022-03-17
Payer: MEDICARE

## 2022-03-17 VITALS
SYSTOLIC BLOOD PRESSURE: 135 MMHG | DIASTOLIC BLOOD PRESSURE: 72 MMHG | BODY MASS INDEX: 28.52 KG/M2 | OXYGEN SATURATION: 98 % | WEIGHT: 155 LBS | HEIGHT: 62 IN | HEART RATE: 64 BPM

## 2022-03-17 PROCEDURE — 99214 OFFICE O/P EST MOD 30 MIN: CPT

## 2022-03-23 NOTE — ASSESSMENT
[FreeTextEntry1] : # Primary Peritoneal Cancer, high grade serous \par -C1D9 carbo/taxol + bevacizumab for high grade serous primary peritoneal carcinomatosis. \par -patient tolerating treatment well so far with relatively mild expected side effects\par -plan for restaging CT after C2 to assess response to treatment\par -RTC Q3 weeks for treatment and mid cycle MD/PA follow up\par -Myriad testing pending.  \par \par #Supportive\par -will order Keflex for small area of cellulitis in L axilla.  Dermatology evaluation with Dr. Andujar if no improvement \par -tylenol for arthralgia/myalgia, can consider tramadol\par -weight stable despite decreased appetite and dysgeusia, will ask RD to follow up. \par \par # At initial evaluation Dr. Garces discussed the natural history of her disease and discussed the role of neoadjuvant chemotherapy as a bridge to optimal debulking surgery.  He discussed the risks and benefits of treatment with carboplatin and paclitaxel. Additionally, he discussed the risk for worsening hearing loss as a result of chemotherapy, albeit low, and the patient is understand and willing to risk worsening hearing loss to receive carboplatin as opposed to an alternative chemotherapy regimen which is not as well studied.  He discussed other side effects including compromised renal function, neuropathies, hair loss, cytopenias, and others. The patient is understanding. All questions answered. The patient was consented.\par - Dr. Garces also discussed with patient adding bevacizumab vs. niraparib, which can be used as part of maintenance therapy. Side effects of both medications discussed; patient is interested in starting bevacizumab, so it was started with cycle 1 and hold with cycle 3 for possible surgery.

## 2022-03-23 NOTE — PHYSICAL EXAM
[Fully active, able to carry on all pre-disease performance without restriction] : Status 0 - Fully active, able to carry on all pre-disease performance without restriction [Thin] : thin [Normal] : affect appropriate [de-identified] : Patient has bilateral hearing loss

## 2022-03-23 NOTE — HISTORY OF PRESENT ILLNESS
[de-identified] : 65 yo F with h/o HTN, hyperlipidemia, GERD, decreased hearing (present since childhood) and depression who was diagnosed with serous carcinoma found in a right side omental nodule in February 2022.\par \par She saw her GYN, Dr. Melissa Jaimes, for her annual exam in November 2021.  A transvaginal U/S showed abnormal endometrial thickening and uterine fibroids. CT A/P performed on 1/26/22 revealed multiple lobular soft tissue masses involving the omentum most pronounced on the R. side adjacent to ascending colon and cecum. Additionally nodularity adjacent to tip of cecum involving peritoneum. 2.9 cm left hemipelvic cyst suspicious for ovarian neoplasia. 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 Patient was negative for BRCA.\par \par She was planned for exploratory laparotomy and excision of the mass with Dr. Barrie Avelar but had a second opinion with Dr. Flores who recommended further work up of the mass with an IR biopsy.  Biopsy done on 2/16/22 and results showed poorly differentiated carcinoma consistent with serous carcinoma. Immunohistochemical stains for CK-7, CK-20, PAX 8, P53, WT1 and P16 were performed on block 1A at [facility] and interpreted at [facility] as follows:  \par CK-7, PAX 8, P16, P53, WT1: Positive.  CK-20: Negative [de-identified] : Patient presents on C1D9 carbo/taxol + bevacizumab for high grade serous primary peritoneal carcinomatosis. \par + Mild fatigue. + Mild abdominal cramping. + Arthralgia/myalgia, knees and LLE. + Decreased appetite. + Dysgeusia. + Caprice sized area of cellulitis/folliculitis in L axilla, likely from shaving. No edema, rash/pruritus, N/V/D/C, headache, flushing, alopecia, fever, cough or SOB.

## 2022-03-23 NOTE — REVIEW OF SYSTEMS
[Fever] : no fever [Chills] : no chills [Chest Pain] : no chest pain [Shortness Of Breath] : no shortness of breath [Abdominal Pain] : no abdominal pain [Easy Bleeding] : no tendency for easy bleeding [Easy Bruising] : no tendency for easy bruising [Swollen Glands] : no swollen glands

## 2022-03-23 NOTE — RESULTS/DATA
[FreeTextEntry1] : \par \par Pathology 2/25/22 \par Accession:                             [id number]\par \par Collected Date/Time:                   2/25/2022 12:15 EST\par Received Date/Time:                    2/25/2022 12:15 EST\par \par Surgical Pathology Consultation Report - Auth (Verified)\par \par Specimen(s) Submitted\par \par 1. Slide Consult- Right omental nodule biopsy (11 slides, 70-Q-,\par 1A1, PAX8, PAX8, CK7, CK7, WT1, P16, P53, WT1, P53)\par \par Final Diagnosis\par Submitting Institution:  [facility]\par \par \par Date of Procedure:  2/16/22\par \par Right omental nodule biopsy\par - Compatible with high grade serous carcinoma of adnexal or peritoneal\par origin, see note\par \par Note: In submitted immunostains, tumor cells are positive for CK7, PAX8,\par WT-1 and p16(diffuse). Tumor cells show aberrant expression(over -\par expression) for p53.\par \par 1/26/22 CT abdomen/pelvis \par multi lobular soft tissue masses involving the omentum most pronounced right side adjacent to the ascending colon and cecum. Additional nodularity adjacent to the tip of the cecum involving the peritoneum. 2.9 cmleft hemipelvic cyst. Findings suspicious for ovarian neoplasia. \par

## 2022-03-25 ENCOUNTER — APPOINTMENT (OUTPATIENT)
Dept: DERMATOLOGY | Facility: CLINIC | Age: 67
End: 2022-03-25
Payer: MEDICARE

## 2022-03-25 PROCEDURE — 99214 OFFICE O/P EST MOD 30 MIN: CPT

## 2022-03-27 ENCOUNTER — OUTPATIENT (OUTPATIENT)
Dept: OUTPATIENT SERVICES | Facility: HOSPITAL | Age: 67
LOS: 1 days | Discharge: HOME | End: 2022-03-27

## 2022-03-27 DIAGNOSIS — Z12.73 ENCOUNTER FOR SCREENING FOR MALIGNANT NEOPLASM OF OVARY: ICD-10-CM

## 2022-03-28 ENCOUNTER — RESULT REVIEW (OUTPATIENT)
Age: 67
End: 2022-03-28

## 2022-03-28 ENCOUNTER — APPOINTMENT (OUTPATIENT)
Dept: HEMATOLOGY ONCOLOGY | Facility: CLINIC | Age: 67
End: 2022-03-28

## 2022-03-28 LAB
ALBUMIN SERPL ELPH-MCNC: 4.4 G/DL
ALP BLD-CCNC: 71 U/L
ALT SERPL-CCNC: 18 U/L
ANION GAP SERPL CALC-SCNC: 12 MMOL/L
ANISOCYTOSIS BLD QL: SLIGHT — SIGNIFICANT CHANGE UP
AST SERPL-CCNC: 21 U/L
BASOPHILS # BLD AUTO: 0.1 K/UL — SIGNIFICANT CHANGE UP (ref 0–0.2)
BASOPHILS NFR BLD AUTO: 1 % — SIGNIFICANT CHANGE UP (ref 0–2)
BILIRUB SERPL-MCNC: 0.2 MG/DL
BUN SERPL-MCNC: 25 MG/DL
CALCIUM SERPL-MCNC: 9.2 MG/DL
CANCER AG125 SERPL-ACNC: 26 U/ML
CHLORIDE SERPL-SCNC: 105 MMOL/L
CO2 SERPL-SCNC: 25 MMOL/L
CREAT SERPL-MCNC: 0.62 MG/DL
EGFR: 99 ML/MIN/1.73M2
EOSINOPHIL # BLD AUTO: 0.1 K/UL — SIGNIFICANT CHANGE UP (ref 0–0.5)
EOSINOPHIL NFR BLD AUTO: 2 % — SIGNIFICANT CHANGE UP (ref 0–6)
GLUCOSE SERPL-MCNC: 98 MG/DL
HCT VFR BLD CALC: 41.2 % — SIGNIFICANT CHANGE UP (ref 34.5–45)
HGB BLD-MCNC: 12.6 G/DL — SIGNIFICANT CHANGE UP (ref 11.5–15.5)
LG PLATELETS BLD QL AUTO: SLIGHT — SIGNIFICANT CHANGE UP
LYMPHOCYTES # BLD AUTO: 1 K/UL — SIGNIFICANT CHANGE UP (ref 1–3.3)
LYMPHOCYTES # BLD AUTO: 47 % — HIGH (ref 13–44)
MACROCYTES BLD QL: SLIGHT — SIGNIFICANT CHANGE UP
MAGNESIUM SERPL-MCNC: 1.8 MG/DL
MCHC RBC-ENTMCNC: 27 PG — SIGNIFICANT CHANGE UP (ref 27–34)
MCHC RBC-ENTMCNC: 30.5 G/DL — LOW (ref 32–36)
MCV RBC AUTO: 88.6 FL — SIGNIFICANT CHANGE UP (ref 80–100)
MICROCYTES BLD QL: SLIGHT — SIGNIFICANT CHANGE UP
MONOCYTES # BLD AUTO: 0.6 K/UL — SIGNIFICANT CHANGE UP (ref 0–0.9)
MONOCYTES NFR BLD AUTO: 22 % — HIGH (ref 2–14)
NEUTROPHILS # BLD AUTO: 0.9 K/UL — LOW (ref 1.8–7.4)
NEUTROPHILS NFR BLD AUTO: 27 % — LOW (ref 43–77)
PLAT MORPH BLD: NORMAL — SIGNIFICANT CHANGE UP
PLATELET # BLD AUTO: 261 K/UL — SIGNIFICANT CHANGE UP (ref 150–400)
POIKILOCYTOSIS BLD QL AUTO: SLIGHT — SIGNIFICANT CHANGE UP
POTASSIUM SERPL-SCNC: 4.5 MMOL/L
PROT SERPL-MCNC: 6.7 G/DL
RBC # BLD: 4.65 M/UL — SIGNIFICANT CHANGE UP (ref 3.8–5.2)
RBC # FLD: 15.6 % — HIGH (ref 10.3–14.5)
RBC BLD AUTO: SIGNIFICANT CHANGE UP
SODIUM SERPL-SCNC: 141 MMOL/L
VARIANT LYMPHS # BLD: 1 % — SIGNIFICANT CHANGE UP (ref 0–6)
WBC # BLD: 2.7 K/UL — LOW (ref 3.8–10.5)
WBC # FLD AUTO: 2.7 K/UL — LOW (ref 3.8–10.5)

## 2022-03-28 NOTE — HISTORY OF PRESENT ILLNESS
[de-identified] : 65 yo F with h/o HTN, hyperlipidemia, GERD, decreased hearing (present since childhood) and depression who was diagnosed with serous carcinoma found in a right side omental nodule in February 2022.\par \par She saw her GYN, Dr. Melissa Jaimes, for her annual exam in November 2021.  A transvaginal U/S showed abnormal endometrial thickening and uterine fibroids. CT A/P performed on 1/26/22 revealed multiple lobular soft tissue masses involving the omentum most pronounced on the R. side adjacent to ascending colon and cecum. Additionally nodularity adjacent to tip of cecum involving peritoneum. 2.9 cm left hemipelvic cyst suspicious for ovarian neoplasia. 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 Patient was negative for BRCA.\par \par She was planned for exploratory laparotomy and excision of the mass with Dr. Barrie Avelar but had a second opinion with Dr. Flores who recommended further work up of the mass with an IR biopsy.  Biopsy done on 2/16/22 and results showed poorly differentiated carcinoma consistent with serous carcinoma. Immunohistochemical stains for CK-7, CK-20, PAX 8, P53, WT1 and P16 were performed on block 1A at [facility] and interpreted at [facility] as follows:  \par CK-7, PAX 8, P16, P53, WT1: Positive.  CK-20: Negative [de-identified] : The patient feels well today and has no new complaints including worsening abdominal pain, nausea, vomiting, diarrhea, chest pain, and SOB.

## 2022-03-28 NOTE — ASSESSMENT
[FreeTextEntry1] : # Primary Peritoneal Cancer, high grade serous, HRD positive \par - I discussed the natural history of her disease and discussed the role of neoadjuvant chemotherapy as a bridge to optimal debulking surgery. I  discussed the risks and benefits of treatment with carboplatin and paclitaxel. Additionally, I discussed the risk for worsening hearing loss as a result of chemotherapy, albeit low, and the patient is understand and willing to risk worsening hearing loss to receive carboplatin as opposed to an alternative chemotherapy regimen which is not as well studied.  I discussed other side effects including compromised renal function, neuropathies, hair loss, cytopenias, and others. The patient is understanding. All questions answered. The patient was consented.\par - Also discussed with patient adding bevacizumab vs. niraparib, which can be used as part of maintenance therapy. Side effects of both medications discussed; patient is interested in starting bevacizumab, so will start with cycle 1-2 and hold with cycle 3. Will get imaging after cycle 2. \par - Will send patient for Myriad testing.  \par \par Addendum: Myriad testing shows that patient is HRD positive. \par

## 2022-03-28 NOTE — RESULTS/DATA
[FreeTextEntry1] : \par \par Pathology 2/25/22 \par Accession:                             [id number]\par \par Collected Date/Time:                   2/25/2022 12:15 EST\par Received Date/Time:                    2/25/2022 12:15 EST\par \par Surgical Pathology Consultation Report - Auth (Verified)\par \par Specimen(s) Submitted\par \par 1. Slide Consult- Right omental nodule biopsy (11 slides, 96-M-,\par 1A1, PAX8, PAX8, CK7, CK7, WT1, P16, P53, WT1, P53)\par \par Final Diagnosis\par Submitting Institution:  [facility]\par \par \par Date of Procedure:  2/16/22\par \par Right omental nodule biopsy\par - Compatible with high grade serous carcinoma of adnexal or peritoneal\par origin, see note\par \par Note: In submitted immunostains, tumor cells are positive for CK7, PAX8,\par WT-1 and p16(diffuse). Tumor cells show aberrant expression(over -\par expression) for p53.\par \par 1/26/22 CT abdomen/pelvis \par multi lobular soft tissue masses involving the omentum most pronounced right side adjacent to the ascending colon and cecum. Additional nodularity adjacent to the tip of the cecum involving the peritoneum. 2.9 cmleft hemipelvic cyst. Findings suspicious for ovarian neoplasia. \par

## 2022-03-28 NOTE — PHYSICAL EXAM
[Fully active, able to carry on all pre-disease performance without restriction] : Status 0 - Fully active, able to carry on all pre-disease performance without restriction [Thin] : thin [Normal] : affect appropriate [de-identified] : Patient has bilateral hearing loss

## 2022-03-29 LAB
APPEARANCE: CLEAR
BACTERIA: NEGATIVE
BILIRUBIN URINE: NEGATIVE
BLOOD URINE: NEGATIVE
COLOR: NORMAL
GLUCOSE QUALITATIVE U: NEGATIVE
HYALINE CASTS: 1 /LPF
KETONES URINE: NEGATIVE
LEUKOCYTE ESTERASE URINE: NEGATIVE
MICROSCOPIC-UA: NORMAL
NITRITE URINE: NEGATIVE
PH URINE: 5.5
PROTEIN URINE: NORMAL
RED BLOOD CELLS URINE: 5 /HPF
SPECIFIC GRAVITY URINE: 1.02
SQUAMOUS EPITHELIAL CELLS: 0 /HPF
UROBILINOGEN URINE: NORMAL
WHITE BLOOD CELLS URINE: 1 /HPF

## 2022-03-30 ENCOUNTER — RESULT REVIEW (OUTPATIENT)
Age: 67
End: 2022-03-30

## 2022-03-30 ENCOUNTER — APPOINTMENT (OUTPATIENT)
Age: 67
End: 2022-03-30

## 2022-03-30 LAB
BASOPHILS # BLD AUTO: 0.1 K/UL — SIGNIFICANT CHANGE UP (ref 0–0.2)
BASOPHILS NFR BLD AUTO: 2.2 % — HIGH (ref 0–2)
EOSINOPHIL # BLD AUTO: 0.1 K/UL — SIGNIFICANT CHANGE UP (ref 0–0.5)
EOSINOPHIL NFR BLD AUTO: 2.2 % — SIGNIFICANT CHANGE UP (ref 0–6)
HCT VFR BLD CALC: 40.9 % — SIGNIFICANT CHANGE UP (ref 34.5–45)
HGB BLD-MCNC: 12.6 G/DL — SIGNIFICANT CHANGE UP (ref 11.5–15.5)
LYMPHOCYTES # BLD AUTO: 1.1 K/UL — SIGNIFICANT CHANGE UP (ref 1–3.3)
LYMPHOCYTES # BLD AUTO: 27 % — SIGNIFICANT CHANGE UP (ref 13–44)
MCHC RBC-ENTMCNC: 27.2 PG — SIGNIFICANT CHANGE UP (ref 27–34)
MCHC RBC-ENTMCNC: 30.8 G/DL — LOW (ref 32–36)
MCV RBC AUTO: 88.4 FL — SIGNIFICANT CHANGE UP (ref 80–100)
MONOCYTES # BLD AUTO: 0.7 K/UL — SIGNIFICANT CHANGE UP (ref 0–0.9)
MONOCYTES NFR BLD AUTO: 16.8 % — HIGH (ref 2–14)
NEUTROPHILS # BLD AUTO: 2.1 K/UL — SIGNIFICANT CHANGE UP (ref 1.8–7.4)
NEUTROPHILS NFR BLD AUTO: 51.9 % — SIGNIFICANT CHANGE UP (ref 43–77)
PLATELET # BLD AUTO: 269 K/UL — SIGNIFICANT CHANGE UP (ref 150–400)
RBC # BLD: 4.63 M/UL — SIGNIFICANT CHANGE UP (ref 3.8–5.2)
RBC # FLD: 15.5 % — HIGH (ref 10.3–14.5)
WBC # BLD: 4 K/UL — SIGNIFICANT CHANGE UP (ref 3.8–10.5)
WBC # FLD AUTO: 4 K/UL — SIGNIFICANT CHANGE UP (ref 3.8–10.5)

## 2022-03-30 NOTE — PRE-OP CHECKLIST - HEIGHT IN CM
Impression: S/P Cataract Extraction by phacoemulsification with IOL placement OS - 21 Days. Encounter for surgical aftercare following surgery on a sense organ  [id number]. Plan: s/p cataract surgery left eye with placement of intraocular Dexycu or subconjunctival Kenalog and moxifloxacin intracamerally. Healing well but has remaining symptoms/inflammation . Advised patient to avoid swimming for at least 2 more weeks. Advised to call immediately for any problems or concerns including, but not limited to, pain, redness, changes in peripheral vision and/or decreased vision. The patient stated an understanding of the above. RTC in 1 month. Taper prednisolone (shake well)OS  TID for one week, BID for one week, QD for one week, then stop.
157.48

## 2022-03-31 DIAGNOSIS — C48.2 MALIGNANT NEOPLASM OF PERITONEUM, UNSPECIFIED: ICD-10-CM

## 2022-03-31 DIAGNOSIS — Z51.11 ENCOUNTER FOR ANTINEOPLASTIC CHEMOTHERAPY: ICD-10-CM

## 2022-03-31 DIAGNOSIS — R11.2 NAUSEA WITH VOMITING, UNSPECIFIED: ICD-10-CM

## 2022-04-06 ENCOUNTER — TRANSCRIPTION ENCOUNTER (OUTPATIENT)
Age: 67
End: 2022-04-06

## 2022-04-08 ENCOUNTER — RESULT REVIEW (OUTPATIENT)
Age: 67
End: 2022-04-08

## 2022-04-08 ENCOUNTER — RX RENEWAL (OUTPATIENT)
Age: 67
End: 2022-04-08

## 2022-04-08 ENCOUNTER — APPOINTMENT (OUTPATIENT)
Dept: HEMATOLOGY ONCOLOGY | Facility: CLINIC | Age: 67
End: 2022-04-08
Payer: MEDICARE

## 2022-04-08 VITALS
SYSTOLIC BLOOD PRESSURE: 148 MMHG | WEIGHT: 155.02 LBS | OXYGEN SATURATION: 98 % | HEIGHT: 62 IN | HEART RATE: 73 BPM | BODY MASS INDEX: 28.53 KG/M2 | DIASTOLIC BLOOD PRESSURE: 77 MMHG

## 2022-04-08 LAB
BASOPHILS # BLD AUTO: 0 K/UL — SIGNIFICANT CHANGE UP (ref 0–0.2)
BASOPHILS NFR BLD AUTO: 0.7 % — SIGNIFICANT CHANGE UP (ref 0–2)
EOSINOPHIL # BLD AUTO: 0.1 K/UL — SIGNIFICANT CHANGE UP (ref 0–0.5)
EOSINOPHIL NFR BLD AUTO: 2.6 % — SIGNIFICANT CHANGE UP (ref 0–6)
HCT VFR BLD CALC: 36.8 % — SIGNIFICANT CHANGE UP (ref 34.5–45)
HGB BLD-MCNC: 11.3 G/DL — LOW (ref 11.5–15.5)
LYMPHOCYTES # BLD AUTO: 0.8 K/UL — LOW (ref 1–3.3)
LYMPHOCYTES # BLD AUTO: 23.9 % — SIGNIFICANT CHANGE UP (ref 13–44)
MCHC RBC-ENTMCNC: 26.9 PG — LOW (ref 27–34)
MCHC RBC-ENTMCNC: 30.8 G/DL — LOW (ref 32–36)
MCV RBC AUTO: 87.5 FL — SIGNIFICANT CHANGE UP (ref 80–100)
MONOCYTES # BLD AUTO: 0.4 K/UL — SIGNIFICANT CHANGE UP (ref 0–0.9)
MONOCYTES NFR BLD AUTO: 10.7 % — SIGNIFICANT CHANGE UP (ref 2–14)
NEUTROPHILS # BLD AUTO: 2.2 K/UL — SIGNIFICANT CHANGE UP (ref 1.8–7.4)
NEUTROPHILS NFR BLD AUTO: 62 % — SIGNIFICANT CHANGE UP (ref 43–77)
PLATELET # BLD AUTO: 115 K/UL — LOW (ref 150–400)
RBC # BLD: 4.21 M/UL — SIGNIFICANT CHANGE UP (ref 3.8–5.2)
RBC # FLD: 14.8 % — HIGH (ref 10.3–14.5)
WBC # BLD: 3.5 K/UL — LOW (ref 3.8–10.5)
WBC # FLD AUTO: 3.5 K/UL — LOW (ref 3.8–10.5)

## 2022-04-08 PROCEDURE — 99214 OFFICE O/P EST MOD 30 MIN: CPT

## 2022-04-09 LAB
ALBUMIN SERPL ELPH-MCNC: 4.5 G/DL
ALP BLD-CCNC: 69 U/L
ALT SERPL-CCNC: 22 U/L
ANION GAP SERPL CALC-SCNC: 9 MMOL/L
AST SERPL-CCNC: 21 U/L
BILIRUB SERPL-MCNC: 0.3 MG/DL
BUN SERPL-MCNC: 31 MG/DL
CALCIUM SERPL-MCNC: 9.4 MG/DL
CANCER AG125 SERPL-ACNC: 13 U/ML
CHLORIDE SERPL-SCNC: 103 MMOL/L
CO2 SERPL-SCNC: 27 MMOL/L
CREAT SERPL-MCNC: 0.65 MG/DL
EGFR: 98 ML/MIN/1.73M2
GLUCOSE SERPL-MCNC: 101 MG/DL
MAGNESIUM SERPL-MCNC: 1.6 MG/DL
POTASSIUM SERPL-SCNC: 4.6 MMOL/L
PROT SERPL-MCNC: 6.8 G/DL
SODIUM SERPL-SCNC: 139 MMOL/L

## 2022-04-13 NOTE — ASSESSMENT
[FreeTextEntry1] : # Primary Peritoneal Cancer, high grade serous, HRD positive \par -Continue carbo/taxol + bevacizumab for high grade serous primary peritoneal carcinomatosis. Will hold bevacizumab with cycle 3. \par - CT chest reviewed with patient; stable 3 mm nodule unlikely to be metastatic. Will continue to monitor. \par - Plan to start olaparib with maintenance bevacizumab after completion of chemotherapy and debulking surgery. \par -Will order CT abdomen/pelvis to be done after next cycle. \par -patient tolerating treatment well so far with relatively mild expected side effects\par -RTC Q3 weeks for treatment and mid cycle MD/PA follow up\par \par #Supportive\par - Patient recently s/p keflex for suppurative hidradenitis \par -weight stable despite decreased appetite and dysgeusia, will ask RD to follow up. \par \par

## 2022-04-13 NOTE — RESULTS/DATA
[FreeTextEntry1] : 3/5/22 Chest CT \par IMPRESSION:\par Stable 3 mm right middle lobe nodule.\par \par Slightly larger right cardiophrenic lymphadenopathy. Stable left supradiaphragmatic lymph nodes.\par \par Partially visualized peritoneal nodularity in the left upper quadrant.\par \par \par Pathology 2/25/22 \par Accession:                             [id number]\par \par Collected Date/Time:                   2/25/2022 12:15 EST\par Received Date/Time:                    2/25/2022 12:15 EST\par \par Surgical Pathology Consultation Report - Auth (Verified)\par \par Specimen(s) Submitted\par \par 1. 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 Tumor cells show aberrant expression(over -\par expression) for p53.\par \par 1/26/22 CT abdomen/pelvis \par multi lobular soft tissue masses involving the omentum most pronounced right side adjacent to the ascending colon and cecum. Additional nodularity adjacent to the tip of the cecum involving the peritoneum. 2.9 cm left hemipelvic cyst. Findings suspicious for ovarian neoplasia. \par

## 2022-04-13 NOTE — HISTORY OF PRESENT ILLNESS
[de-identified] : 67 yo F with h/o HTN, hyperlipidemia, GERD, decreased hearing (present since childhood) and depression who was diagnosed with serous carcinoma found in a right side omental nodule in February 2022.\par \par She saw her GYN, Dr. Melissa Jaimes, for her annual exam in November 2021.  A transvaginal U/S showed abnormal endometrial thickening and uterine fibroids. CT A/P performed on 1/26/22 revealed multiple lobular soft tissue masses involving the omentum most pronounced on the R. side adjacent to ascending colon and cecum. Additionally nodularity adjacent to tip of cecum involving peritoneum. 2.9 cm left hemipelvic cyst suspicious for ovarian neoplasia. 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 Patient was negative for BRCA.\par \par She was planned for exploratory laparotomy and excision of the mass with Dr. Barrie Avelar but had a second opinion with Dr. Flores who recommended further work up of the mass with an IR biopsy.  Biopsy done on 2/16/22 and results showed poorly differentiated carcinoma consistent with serous carcinoma. Immunohistochemical stains for CK-7, CK-20, PAX 8, P53, WT1 and P16 were performed on block 1A at [facility] and interpreted at [facility] as follows:  \par CK-7, PAX 8, P16, P53, WT1: Positive.  CK-20: Negative\par \par The patient was started on carbo/taxol/estuardo. The patient was found to be HRD positive by [employer] testing.  [de-identified] : The patient states that she is doing well and tolerating chemotherapy well. She has some slight numbness and tingling for several days after each cycle, but states that it returns to baseline by the end of the cycle. She has no changes in her ability to complete ADLs. She denies worsening hearing. She also denies abdominal pain, vaginal bleeding, nausea, vomiting, and diarrhea

## 2022-04-13 NOTE — PHYSICAL EXAM
[Fully active, able to carry on all pre-disease performance without restriction] : Status 0 - Fully active, able to carry on all pre-disease performance without restriction [Thin] : thin [Normal] : affect appropriate [de-identified] : Patient has bilateral hearing loss

## 2022-04-15 ENCOUNTER — APPOINTMENT (OUTPATIENT)
Dept: DERMATOLOGY | Facility: CLINIC | Age: 67
End: 2022-04-15
Payer: MEDICARE

## 2022-04-15 PROCEDURE — 99213 OFFICE O/P EST LOW 20 MIN: CPT

## 2022-04-18 ENCOUNTER — APPOINTMENT (OUTPATIENT)
Dept: HEMATOLOGY ONCOLOGY | Facility: CLINIC | Age: 67
End: 2022-04-18

## 2022-04-18 ENCOUNTER — RESULT REVIEW (OUTPATIENT)
Age: 67
End: 2022-04-18

## 2022-04-18 LAB
ANISOCYTOSIS BLD QL: SLIGHT — SIGNIFICANT CHANGE UP
BASOPHILS # BLD AUTO: 0 K/UL — SIGNIFICANT CHANGE UP (ref 0–0.2)
BASOPHILS NFR BLD AUTO: 2 % — SIGNIFICANT CHANGE UP (ref 0–2)
ELLIPTOCYTES BLD QL SMEAR: SLIGHT — SIGNIFICANT CHANGE UP
EOSINOPHIL # BLD AUTO: 0.1 K/UL — SIGNIFICANT CHANGE UP (ref 0–0.5)
EOSINOPHIL NFR BLD AUTO: 1 % — SIGNIFICANT CHANGE UP (ref 0–6)
HCT VFR BLD CALC: 40.2 % — SIGNIFICANT CHANGE UP (ref 34.5–45)
HGB BLD-MCNC: 12.3 G/DL — SIGNIFICANT CHANGE UP (ref 11.5–15.5)
LG PLATELETS BLD QL AUTO: SLIGHT — SIGNIFICANT CHANGE UP
LYMPHOCYTES # BLD AUTO: 1 K/UL — SIGNIFICANT CHANGE UP (ref 1–3.3)
LYMPHOCYTES # BLD AUTO: 36 % — SIGNIFICANT CHANGE UP (ref 13–44)
MACROCYTES BLD QL: SLIGHT — SIGNIFICANT CHANGE UP
MCHC RBC-ENTMCNC: 27.7 PG — SIGNIFICANT CHANGE UP (ref 27–34)
MCHC RBC-ENTMCNC: 30.6 G/DL — LOW (ref 32–36)
MCV RBC AUTO: 90.8 FL — SIGNIFICANT CHANGE UP (ref 80–100)
MICROCYTES BLD QL: SLIGHT — SIGNIFICANT CHANGE UP
MONOCYTES # BLD AUTO: 0.6 K/UL — SIGNIFICANT CHANGE UP (ref 0–0.9)
MONOCYTES NFR BLD AUTO: 21 % — HIGH (ref 2–14)
NEUTROPHILS # BLD AUTO: 1 K/UL — LOW (ref 1.8–7.4)
NEUTROPHILS NFR BLD AUTO: 38 % — LOW (ref 43–77)
OVALOCYTES BLD QL SMEAR: SLIGHT — SIGNIFICANT CHANGE UP
PLAT MORPH BLD: NORMAL — SIGNIFICANT CHANGE UP
PLATELET # BLD AUTO: 187 K/UL — SIGNIFICANT CHANGE UP (ref 150–400)
POIKILOCYTOSIS BLD QL AUTO: SLIGHT — SIGNIFICANT CHANGE UP
POLYCHROMASIA BLD QL SMEAR: SLIGHT — SIGNIFICANT CHANGE UP
RBC # BLD: 4.43 M/UL — SIGNIFICANT CHANGE UP (ref 3.8–5.2)
RBC # FLD: 17 % — HIGH (ref 10.3–14.5)
RBC BLD AUTO: SIGNIFICANT CHANGE UP
VARIANT LYMPHS # BLD: 2 % — SIGNIFICANT CHANGE UP (ref 0–6)
WBC # BLD: 2.6 K/UL — LOW (ref 3.8–10.5)
WBC # FLD AUTO: 2.6 K/UL — LOW (ref 3.8–10.5)

## 2022-04-19 LAB
ALBUMIN SERPL ELPH-MCNC: 4.3 G/DL
ALP BLD-CCNC: 67 U/L
ALT SERPL-CCNC: 20 U/L
ANION GAP SERPL CALC-SCNC: 9 MMOL/L
AST SERPL-CCNC: 20 U/L
BILIRUB SERPL-MCNC: 0.2 MG/DL
BUN SERPL-MCNC: 23 MG/DL
CALCIUM SERPL-MCNC: 9.6 MG/DL
CHLORIDE SERPL-SCNC: 105 MMOL/L
CO2 SERPL-SCNC: 29 MMOL/L
CREAT SERPL-MCNC: 0.63 MG/DL
EGFR: 98 ML/MIN/1.73M2
GLUCOSE SERPL-MCNC: 98 MG/DL
MAGNESIUM SERPL-MCNC: 1.8 MG/DL
POTASSIUM SERPL-SCNC: 4.8 MMOL/L
PROT SERPL-MCNC: 6.5 G/DL
SODIUM SERPL-SCNC: 143 MMOL/L

## 2022-04-20 ENCOUNTER — RESULT REVIEW (OUTPATIENT)
Age: 67
End: 2022-04-20

## 2022-04-20 ENCOUNTER — APPOINTMENT (OUTPATIENT)
Age: 67
End: 2022-04-20

## 2022-04-20 LAB
BASOPHILS # BLD AUTO: 0 K/UL — SIGNIFICANT CHANGE UP (ref 0–0.2)
BASOPHILS NFR BLD AUTO: 1.1 % — SIGNIFICANT CHANGE UP (ref 0–2)
EOSINOPHIL # BLD AUTO: 0.1 K/UL — SIGNIFICANT CHANGE UP (ref 0–0.5)
EOSINOPHIL NFR BLD AUTO: 1.7 % — SIGNIFICANT CHANGE UP (ref 0–6)
HCT VFR BLD CALC: 40.2 % — SIGNIFICANT CHANGE UP (ref 34.5–45)
HGB BLD-MCNC: 12.9 G/DL — SIGNIFICANT CHANGE UP (ref 11.5–15.5)
LYMPHOCYTES # BLD AUTO: 1 K/UL — SIGNIFICANT CHANGE UP (ref 1–3.3)
LYMPHOCYTES # BLD AUTO: 25 % — SIGNIFICANT CHANGE UP (ref 13–44)
MCHC RBC-ENTMCNC: 28.5 PG — SIGNIFICANT CHANGE UP (ref 27–34)
MCHC RBC-ENTMCNC: 32.1 G/DL — SIGNIFICANT CHANGE UP (ref 32–36)
MCV RBC AUTO: 88.8 FL — SIGNIFICANT CHANGE UP (ref 80–100)
MONOCYTES # BLD AUTO: 0.7 K/UL — SIGNIFICANT CHANGE UP (ref 0–0.9)
MONOCYTES NFR BLD AUTO: 16.2 % — HIGH (ref 2–14)
NEUTROPHILS # BLD AUTO: 2.2 K/UL — SIGNIFICANT CHANGE UP (ref 1.8–7.4)
NEUTROPHILS NFR BLD AUTO: 56 % — SIGNIFICANT CHANGE UP (ref 43–77)
PLATELET # BLD AUTO: 192 K/UL — SIGNIFICANT CHANGE UP (ref 150–400)
RBC # BLD: 4.53 M/UL — SIGNIFICANT CHANGE UP (ref 3.8–5.2)
RBC # FLD: 16.2 % — HIGH (ref 10.3–14.5)
WBC # BLD: 4 K/UL — SIGNIFICANT CHANGE UP (ref 3.8–10.5)
WBC # FLD AUTO: 4 K/UL — SIGNIFICANT CHANGE UP (ref 3.8–10.5)

## 2022-04-21 DIAGNOSIS — Z51.89 ENCOUNTER FOR OTHER SPECIFIED AFTERCARE: ICD-10-CM

## 2022-04-25 ENCOUNTER — OUTPATIENT (OUTPATIENT)
Dept: OUTPATIENT SERVICES | Facility: HOSPITAL | Age: 67
LOS: 1 days | Discharge: HOME | End: 2022-04-25

## 2022-04-25 DIAGNOSIS — C48.2 MALIGNANT NEOPLASM OF PERITONEUM, UNSPECIFIED: ICD-10-CM

## 2022-04-26 ENCOUNTER — TRANSCRIPTION ENCOUNTER (OUTPATIENT)
Age: 67
End: 2022-04-26

## 2022-04-28 ENCOUNTER — RESULT REVIEW (OUTPATIENT)
Age: 67
End: 2022-04-28

## 2022-05-02 ENCOUNTER — APPOINTMENT (OUTPATIENT)
Dept: HEMATOLOGY ONCOLOGY | Facility: CLINIC | Age: 67
End: 2022-05-02
Payer: MEDICARE

## 2022-05-02 ENCOUNTER — RESULT REVIEW (OUTPATIENT)
Age: 67
End: 2022-05-02

## 2022-05-02 ENCOUNTER — TRANSCRIPTION ENCOUNTER (OUTPATIENT)
Age: 67
End: 2022-05-02

## 2022-05-02 VITALS
BODY MASS INDEX: 28.16 KG/M2 | DIASTOLIC BLOOD PRESSURE: 68 MMHG | HEART RATE: 71 BPM | SYSTOLIC BLOOD PRESSURE: 109 MMHG | OXYGEN SATURATION: 97 % | HEIGHT: 62 IN | WEIGHT: 153 LBS

## 2022-05-02 LAB
ALBUMIN SERPL ELPH-MCNC: 4.2 G/DL
ALP BLD-CCNC: 89 U/L
ALT SERPL-CCNC: 29 U/L
ANION GAP SERPL CALC-SCNC: 13 MMOL/L
AST SERPL-CCNC: 22 U/L
BASOPHILS # BLD AUTO: 0 K/UL — SIGNIFICANT CHANGE UP (ref 0–0.2)
BASOPHILS NFR BLD AUTO: 0.7 % — SIGNIFICANT CHANGE UP (ref 0–2)
BILIRUB SERPL-MCNC: 0.2 MG/DL
BUN SERPL-MCNC: 25 MG/DL
CALCIUM SERPL-MCNC: 9.3 MG/DL
CHLORIDE SERPL-SCNC: 105 MMOL/L
CO2 SERPL-SCNC: 26 MMOL/L
CREAT SERPL-MCNC: 0.68 MG/DL
EGFR: 97 ML/MIN/1.73M2
EOSINOPHIL # BLD AUTO: 0 K/UL — SIGNIFICANT CHANGE UP (ref 0–0.5)
EOSINOPHIL NFR BLD AUTO: 0.8 % — SIGNIFICANT CHANGE UP (ref 0–6)
GLUCOSE SERPL-MCNC: 96 MG/DL
HCT VFR BLD CALC: 36.3 % — SIGNIFICANT CHANGE UP (ref 34.5–45)
HGB BLD-MCNC: 11.5 G/DL — SIGNIFICANT CHANGE UP (ref 11.5–15.5)
LYMPHOCYTES # BLD AUTO: 1 K/UL — SIGNIFICANT CHANGE UP (ref 1–3.3)
LYMPHOCYTES # BLD AUTO: 18.7 % — SIGNIFICANT CHANGE UP (ref 13–44)
MAGNESIUM SERPL-MCNC: 1.7 MG/DL
MCHC RBC-ENTMCNC: 28.4 PG — SIGNIFICANT CHANGE UP (ref 27–34)
MCHC RBC-ENTMCNC: 31.7 G/DL — LOW (ref 32–36)
MCV RBC AUTO: 89.5 FL — SIGNIFICANT CHANGE UP (ref 80–100)
MONOCYTES # BLD AUTO: 0.4 K/UL — SIGNIFICANT CHANGE UP (ref 0–0.9)
MONOCYTES NFR BLD AUTO: 8.2 % — SIGNIFICANT CHANGE UP (ref 2–14)
NEUTROPHILS # BLD AUTO: 3.9 K/UL — SIGNIFICANT CHANGE UP (ref 1.8–7.4)
NEUTROPHILS NFR BLD AUTO: 71.7 % — SIGNIFICANT CHANGE UP (ref 43–77)
PLATELET # BLD AUTO: 139 K/UL — LOW (ref 150–400)
POTASSIUM SERPL-SCNC: 4.8 MMOL/L
PROT SERPL-MCNC: 6.4 G/DL
RBC # BLD: 4.05 M/UL — SIGNIFICANT CHANGE UP (ref 3.8–5.2)
RBC # FLD: 15.9 % — HIGH (ref 10.3–14.5)
SODIUM SERPL-SCNC: 144 MMOL/L
WBC # BLD: 5.4 K/UL — SIGNIFICANT CHANGE UP (ref 3.8–10.5)
WBC # FLD AUTO: 5.4 K/UL — SIGNIFICANT CHANGE UP (ref 3.8–10.5)

## 2022-05-02 PROCEDURE — 99214 OFFICE O/P EST MOD 30 MIN: CPT

## 2022-05-02 RX ORDER — CLOBETASOL PROPIONATE 0.5 MG/G
0.05 OINTMENT TOPICAL
Refills: 0 | Status: DISCONTINUED | COMMUNITY
End: 2022-05-02

## 2022-05-02 RX ORDER — PANTOPRAZOLE SODIUM 20 MG/1
TABLET, DELAYED RELEASE ORAL
Refills: 0 | Status: DISCONTINUED | COMMUNITY
End: 2022-05-02

## 2022-05-02 RX ORDER — FLUTICASONE PROPIONATE 50 UG/1
50 SPRAY, METERED NASAL
Qty: 1 | Refills: 0 | Status: DISCONTINUED | COMMUNITY
Start: 2018-03-09 | End: 2022-05-02

## 2022-05-02 RX ORDER — MISOPROSTOL 200 UG/1
200 TABLET ORAL
Qty: 12 | Refills: 0 | Status: DISCONTINUED | COMMUNITY
Start: 2021-11-23 | End: 2022-05-02

## 2022-05-06 ENCOUNTER — APPOINTMENT (OUTPATIENT)
Dept: CT IMAGING | Facility: CLINIC | Age: 67
End: 2022-05-06
Payer: MEDICARE

## 2022-05-06 ENCOUNTER — OUTPATIENT (OUTPATIENT)
Dept: OUTPATIENT SERVICES | Facility: HOSPITAL | Age: 67
LOS: 1 days | End: 2022-05-06

## 2022-05-06 DIAGNOSIS — Z00.8 ENCOUNTER FOR OTHER GENERAL EXAMINATION: ICD-10-CM

## 2022-05-06 PROCEDURE — 71260 CT THORAX DX C+: CPT | Mod: 26

## 2022-05-06 PROCEDURE — 74177 CT ABD & PELVIS W/CONTRAST: CPT | Mod: 26

## 2022-05-10 ENCOUNTER — APPOINTMENT (OUTPATIENT)
Dept: HEMATOLOGY ONCOLOGY | Facility: CLINIC | Age: 67
End: 2022-05-10

## 2022-05-10 ENCOUNTER — RESULT REVIEW (OUTPATIENT)
Age: 67
End: 2022-05-10

## 2022-05-10 LAB
BASOPHILS # BLD AUTO: 0 K/UL — SIGNIFICANT CHANGE UP (ref 0–0.2)
BASOPHILS NFR BLD AUTO: 1.2 % — SIGNIFICANT CHANGE UP (ref 0–2)
EOSINOPHIL # BLD AUTO: 0 K/UL — SIGNIFICANT CHANGE UP (ref 0–0.5)
EOSINOPHIL NFR BLD AUTO: 0.5 % — SIGNIFICANT CHANGE UP (ref 0–6)
HCT VFR BLD CALC: 37 % — SIGNIFICANT CHANGE UP (ref 34.5–45)
HGB BLD-MCNC: 11.9 G/DL — SIGNIFICANT CHANGE UP (ref 11.5–15.5)
LYMPHOCYTES # BLD AUTO: 0.9 K/UL — LOW (ref 1–3.3)
LYMPHOCYTES # BLD AUTO: 21.4 % — SIGNIFICANT CHANGE UP (ref 13–44)
MCHC RBC-ENTMCNC: 29.2 PG — SIGNIFICANT CHANGE UP (ref 27–34)
MCHC RBC-ENTMCNC: 32.2 G/DL — SIGNIFICANT CHANGE UP (ref 32–36)
MCV RBC AUTO: 90.7 FL — SIGNIFICANT CHANGE UP (ref 80–100)
MONOCYTES # BLD AUTO: 0.7 K/UL — SIGNIFICANT CHANGE UP (ref 0–0.9)
MONOCYTES NFR BLD AUTO: 16.3 % — HIGH (ref 2–14)
NEUTROPHILS # BLD AUTO: 2.5 K/UL — SIGNIFICANT CHANGE UP (ref 1.8–7.4)
NEUTROPHILS NFR BLD AUTO: 60.7 % — SIGNIFICANT CHANGE UP (ref 43–77)
PLATELET # BLD AUTO: 249 K/UL — SIGNIFICANT CHANGE UP (ref 150–400)
RBC # BLD: 4.08 M/UL — SIGNIFICANT CHANGE UP (ref 3.8–5.2)
RBC # FLD: 17.4 % — HIGH (ref 10.3–14.5)
WBC # BLD: 4.2 K/UL — SIGNIFICANT CHANGE UP (ref 3.8–10.5)
WBC # FLD AUTO: 4.2 K/UL — SIGNIFICANT CHANGE UP (ref 3.8–10.5)

## 2022-05-11 ENCOUNTER — APPOINTMENT (OUTPATIENT)
Dept: GYNECOLOGIC ONCOLOGY | Facility: CLINIC | Age: 67
End: 2022-05-11
Payer: MEDICARE

## 2022-05-11 VITALS
HEIGHT: 62 IN | WEIGHT: 153 LBS | HEART RATE: 103 BPM | RESPIRATION RATE: 16 BRPM | DIASTOLIC BLOOD PRESSURE: 77 MMHG | SYSTOLIC BLOOD PRESSURE: 157 MMHG | BODY MASS INDEX: 28.16 KG/M2 | OXYGEN SATURATION: 98 %

## 2022-05-11 LAB
ALBUMIN SERPL ELPH-MCNC: 4.3 G/DL
ALP BLD-CCNC: 72 U/L
ALT SERPL-CCNC: 17 U/L
ANION GAP SERPL CALC-SCNC: 12 MMOL/L
AST SERPL-CCNC: 21 U/L
BILIRUB SERPL-MCNC: 0.3 MG/DL
BUN SERPL-MCNC: 25 MG/DL
CALCIUM SERPL-MCNC: 9.4 MG/DL
CANCER AG125 SERPL-ACNC: 10 U/ML
CHLORIDE SERPL-SCNC: 104 MMOL/L
CO2 SERPL-SCNC: 25 MMOL/L
CREAT SERPL-MCNC: 0.68 MG/DL
EGFR: 97 ML/MIN/1.73M2
GLUCOSE SERPL-MCNC: 95 MG/DL
MAGNESIUM SERPL-MCNC: 1.7 MG/DL
POTASSIUM SERPL-SCNC: 4.9 MMOL/L
PROT SERPL-MCNC: 6.6 G/DL
SODIUM SERPL-SCNC: 142 MMOL/L

## 2022-05-11 PROCEDURE — 99214 OFFICE O/P EST MOD 30 MIN: CPT

## 2022-05-11 NOTE — HISTORY OF PRESENT ILLNESS
[de-identified] : 67 yo F with h/o HTN, hyperlipidemia, GERD, decreased hearing (present since childhood) and depression who was diagnosed with serous carcinoma found in a right side omental nodule in February 2022.\par \par She saw her GYN, Dr. Melissa Jaimes, for her annual exam in November 2021.  A transvaginal U/S showed abnormal endometrial thickening and uterine fibroids. CT A/P performed on 1/26/22 revealed multiple lobular soft tissue masses involving the omentum most pronounced on the R. side adjacent to ascending colon and cecum. Additionally nodularity adjacent to tip of cecum involving peritoneum. 2.9 cm left hemipelvic cyst suspicious for ovarian neoplasia. 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 Patient was negative for BRCA.\par \par She was planned for exploratory laparotomy and excision of the mass with Dr. Barrie Avelar but had a second opinion with Dr. Flores who recommended further work up of the mass with an IR biopsy.  Biopsy done on 2/16/22 and results showed poorly differentiated carcinoma consistent with serous carcinoma. Immunohistochemical stains for CK-7, CK-20, PAX 8, P53, WT1 and P16 were performed on block 1A at [facility] and interpreted at [facility] as follows:  \par CK-7, PAX 8, P16, P53, WT1: Positive.  CK-20: Negative\par \par The patient was started on carbo/taxol/estuardo. The patient was found to be HRD positive by [email or web address] testing.  [de-identified] : Patient presents on C3D13 carbo/taxol for high grade serous primary peritoneal carcinomatosis. Bevacizumab held this cycle in anticipation of surgery. \par +Neuropathy, last ~10 days post treatment then resolves. + Fatigue.  + Arthralgia/myalgia, knees and LLE, worse recently. + Decreased appetite improved and weight stable. + Dysgeusia, slowly improving. + Alopecia.  Caprice sized area of cellulitis/folliculitis in L axilla resolved. Mild abdominal cramping resolving. No edema, rash/pruritus, N/V/D/C, headache, flushing, fever, cough or SOB.

## 2022-05-11 NOTE — RESULTS/DATA
[FreeTextEntry1] : 3/5/22 Chest CT \par IMPRESSION:\par Stable 3 mm right middle lobe nodule.\par \par Slightly larger right cardiophrenic lymphadenopathy. Stable left supradiaphragmatic lymph nodes.\par \par Partially visualized peritoneal nodularity in the left upper quadrant.\par \par \par Pathology 2/25/22 \par Accession:                             [id number]\par \par Collected Date/Time:                   2/25/2022 12:15 EST\par Received Date/Time:                    2/25/2022 12:15 EST\par \par Surgical Pathology Consultation Report - Auth (Verified)\par \par Specimen(s) Submitted\par \par 1. 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 Tumor cells show aberrant expression(over -\par expression) for p53.\par \par 1/26/22 CT abdomen/pelvis \par multi lobular soft tissue masses involving the omentum most pronounced right side adjacent to the ascending colon and cecum. Additional nodularity adjacent to the tip of the cecum involving the peritoneum. 2.9 cm left hemipelvic cyst. Findings suspicious for ovarian neoplasia. \par

## 2022-05-11 NOTE — PHYSICAL EXAM
[Fully active, able to carry on all pre-disease performance without restriction] : Status 0 - Fully active, able to carry on all pre-disease performance without restriction [Thin] : thin [Normal] : affect appropriate [de-identified] : Patient has bilateral hearing loss

## 2022-05-11 NOTE — HISTORY OF PRESENT ILLNESS
[FreeTextEntry1] : This 65 y/o with atleast Stage IIIC serous carcinoma was referred in February 2022 for a second opinion on pelvic mass. 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 Patient had a CT A/P 1/2022 revealing multiple lobular soft tissue masses involving the omentum most pronounced on the R. side adjacent to ascending colon and cecum. Additionally nodularity adj to tip of cecum involving peritoneum. 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 Cannot exclude peritoneal or GI malignancy. She was planned for Exploratory laparotomy excision of mass with Dr. Avelar. (Oncology) on 2/15/22. Patients Ca125 was elevated in January '22 at 211. I ordered a CT guided bx of omentum right nodule core bx which revealed Poorly differentiated carcinoma consistent with serous carcinoma. I discussed these results with patient and her  as well as her Daughter Dr. Retana which is a pathologist at [facility]. I discussed my recommendation of patient having a consultation with hem/onc to get started on treatment. We discussed her case at our tumor board meeting and our recommendations was for patient to have 3 cycles of chemotherapy, depending on pts response to therapy we will discuss HIPEC vs completion of 6 cycles. The patient was started on carbo/taxol/estuardo. 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 She returns to the office today for a svl visit and to discuss next steps in treatment. \par \par Ca125 4/8/22 was 13 \par \par CT C/A/P 5/6/22 IMPRESSION:\par No gross evidence for carcinomatosis. 2.3 x 1.2 cm fluid density in the expected location of left adnexa this patient status post bilateral salpingo-oophorectomy. This may represent a small seroma or lymphocele. Minimal hazy soft tissue stranding in the supra colic omentum at site of previously visualized omental implant.\par

## 2022-05-11 NOTE — ASSESSMENT
[FreeTextEntry1] : I discussed with patient that she appears to have responded to chemotherapy very well. \par \par I discussed at length with the patient the nature, purpose, risks, benefits, and alternatives of exploratory laparotomy and the use of HIPEC via a vertical, midline incision.  The patient understands the risks to include (but not be limited to) bowel injury, bleeding (with the possible need for transfusion), bladder or ureteral injury, infections, deep venous thrombosis, and jaswinder-operative death.  She is also aware of  the possibility of  a unrecognized surgical complication with need for subsequent re-exploration.  She also understands the rationale for a radical debulking should carcinomatosis be found.  She understands that this may extend the planned procedure to include a splenectomy, partial liver resection, bowel resection with possible stoma, and partial diaphragm removal.  She knows that such procedures may be indicated to properly debulk her disease and may carry significant risk to her life.  She understands the limited data regarding the use of hyperthermic chemotherapy in this setting.  She understands the significant morbidity (including death) that can arise from the use of this technique.  She is aware of my experience with the technique.  She agrees to proceed.  She asked numerous questions which were answered to her satisfaction.  She understands the need for a pre-operative bowel preparation and agrees to comply with our instructions.\par  \par I discussed at length with the patient the nature, purpose, risks, benefits, and alternatives of total abdominal hysterectomy and bilateral salpingo-oophorectomy omentectomy possible radical debulking via a vertical, midline incision .  The patient understands the risks to include (but not be limited to) bowel injury, bleeding (with the possible need for transfusion), bladder or ureteral injury, infections, protracted wound closure, deep venous thrombosis, and jaswinder-operative death.  She is also aware of  the possibility of  a unrecognized surgical complication with need for subsequent re-exploration.  She understands the implications that removing both ovaries may have on the quality of her life.  She agrees to proceed.  She asked numerous questions which were answered to her satisfaction.  She understands the need for a pre-operative bowel preparation and agrees to comply with our instructions.\par

## 2022-05-11 NOTE — ASSESSMENT
[FreeTextEntry1] : # Primary Peritoneal Cancer, high grade serous, HRD positive \par - C3D13 carbo/taxol for high grade serous primary peritoneal carcinomatosis. Bevacizumab held this cycle in anticipation of surgery.  \par - patient tolerating treatment well so far with relatively mild expected side effects\par - CT chest done 3/5/22 reviewed with patient on 4/8/22; stable 3 mm nodule unlikely to be metastatic. Will continue to monitor. \par - Plan to start olaparib with maintenance bevacizumab after completion of chemotherapy and debulking surgery. \par - restaging CT abdomen/pelvis scheduled for 5/6/22 \par - Dr. Jeff Flores follow up on 5/11/22, further plan pending his decision on surgery\par \par #Supportive\par - Patient recently s/p keflex for suppurative hidradenitis, resolved \par - weight stable despite decreased appetite and dysgeusia, will ask RD to follow up. \par - denies need for narcotic pain meds for arthralgia/myalgia\par - will consider Dr. Saunders evaluation for neuropathy if symptoms worsen \par \par 
Yes

## 2022-05-11 NOTE — PHYSICAL EXAM
[Chaperone Present] : A chaperone was present in the examining room during all aspects of the physical examination [Normal] : Bimanual Exam: Normal [FreeTextEntry1] : Xuan Shearer Medical assistant chaperoned during gynecologic exam.

## 2022-05-11 NOTE — REASON FOR VISIT
[Spouse] : spouse [FreeTextEntry1] : [place] Location \par \par [facility] [place]. 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 \par -Negative for BRCA. \par -CT A/P 1/26/22 w/ multiple lobular soft tissue masses involving omentum most pronounced on the R. side adjacent to ascending colon and cecum. Additionally nodularity adj to tip of cecum involving peritoneum. 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 Cannot exclude peritoneal or GI malignancy.\par -Was planned for Ex-Lap excision of mass with  (oncology) 2/15/22. \par -Ca125 1/18/22 elevated 211 \par -CT guided bx of omentum (R) nodule w/ poorly differentiated carcinoma consistent w/ serous carcinoma\par -Referred to hem/onc\par -TMB recommendation 3 cycles of neoadjuvant chemo first, depending on response will discuss hipec vs completion of 6 cycles. \par -Ca125 4/8/22 was 13\par -CT C/A/P 5/6/22. \par -F/u for svl & discuss next steps in treatment.

## 2022-05-12 ENCOUNTER — APPOINTMENT (OUTPATIENT)
Age: 67
End: 2022-05-12

## 2022-05-16 ENCOUNTER — FORM ENCOUNTER (OUTPATIENT)
Age: 67
End: 2022-05-16

## 2022-05-16 ENCOUNTER — TRANSCRIPTION ENCOUNTER (OUTPATIENT)
Age: 67
End: 2022-05-16

## 2022-05-24 ENCOUNTER — APPOINTMENT (OUTPATIENT)
Dept: FAMILY MEDICINE | Facility: CLINIC | Age: 67
End: 2022-05-24
Payer: MEDICARE

## 2022-05-24 ENCOUNTER — FORM ENCOUNTER (OUTPATIENT)
Age: 67
End: 2022-05-24

## 2022-05-24 VITALS
WEIGHT: 150 LBS | HEART RATE: 79 BPM | OXYGEN SATURATION: 98 % | DIASTOLIC BLOOD PRESSURE: 68 MMHG | SYSTOLIC BLOOD PRESSURE: 138 MMHG | HEIGHT: 62 IN | TEMPERATURE: 96.5 F | BODY MASS INDEX: 27.6 KG/M2

## 2022-05-24 DIAGNOSIS — Z87.39 PERSONAL HISTORY OF OTHER DISEASES OF THE MUSCULOSKELETAL SYSTEM AND CONNECTIVE TISSUE: ICD-10-CM

## 2022-05-24 DIAGNOSIS — L03.112 CELLULITIS OF LEFT AXILLA: ICD-10-CM

## 2022-05-24 PROCEDURE — 99214 OFFICE O/P EST MOD 30 MIN: CPT

## 2022-05-26 ENCOUNTER — OUTPATIENT (OUTPATIENT)
Dept: OUTPATIENT SERVICES | Facility: HOSPITAL | Age: 67
LOS: 1 days | End: 2022-05-26
Payer: MEDICARE

## 2022-05-26 VITALS
TEMPERATURE: 97 F | OXYGEN SATURATION: 98 % | HEIGHT: 62 IN | HEART RATE: 65 BPM | WEIGHT: 149.91 LBS | RESPIRATION RATE: 16 BRPM | DIASTOLIC BLOOD PRESSURE: 74 MMHG | SYSTOLIC BLOOD PRESSURE: 125 MMHG

## 2022-05-26 DIAGNOSIS — Z29.9 ENCOUNTER FOR PROPHYLACTIC MEASURES, UNSPECIFIED: ICD-10-CM

## 2022-05-26 DIAGNOSIS — I10 ESSENTIAL (PRIMARY) HYPERTENSION: ICD-10-CM

## 2022-05-26 DIAGNOSIS — Z01.818 ENCOUNTER FOR OTHER PREPROCEDURAL EXAMINATION: ICD-10-CM

## 2022-05-26 DIAGNOSIS — K21.9 GASTRO-ESOPHAGEAL REFLUX DISEASE WITHOUT ESOPHAGITIS: ICD-10-CM

## 2022-05-26 DIAGNOSIS — Z98.51 TUBAL LIGATION STATUS: Chronic | ICD-10-CM

## 2022-05-26 DIAGNOSIS — C56.9 MALIGNANT NEOPLASM OF UNSPECIFIED OVARY: ICD-10-CM

## 2022-05-26 DIAGNOSIS — Z90.722 ACQUIRED ABSENCE OF OVARIES, BILATERAL: Chronic | ICD-10-CM

## 2022-05-26 DIAGNOSIS — E78.5 HYPERLIPIDEMIA, UNSPECIFIED: ICD-10-CM

## 2022-05-26 DIAGNOSIS — F32.9 MAJOR DEPRESSIVE DISORDER, SINGLE EPISODE, UNSPECIFIED: ICD-10-CM

## 2022-05-26 DIAGNOSIS — Z98.890 OTHER SPECIFIED POSTPROCEDURAL STATES: Chronic | ICD-10-CM

## 2022-05-26 DIAGNOSIS — Z96.21 COCHLEAR IMPLANT STATUS: Chronic | ICD-10-CM

## 2022-05-26 LAB
A1C WITH ESTIMATED AVERAGE GLUCOSE RESULT: 5.7 % — HIGH (ref 4–5.6)
ALBUMIN SERPL ELPH-MCNC: 4.1 G/DL — SIGNIFICANT CHANGE UP (ref 3.3–5.2)
ALP SERPL-CCNC: 56 U/L — SIGNIFICANT CHANGE UP (ref 40–120)
ALT FLD-CCNC: 15 U/L — SIGNIFICANT CHANGE UP
ANION GAP SERPL CALC-SCNC: 9 MMOL/L — SIGNIFICANT CHANGE UP (ref 5–17)
APTT BLD: 31.8 SEC — SIGNIFICANT CHANGE UP (ref 27.5–35.5)
AST SERPL-CCNC: 20 U/L — SIGNIFICANT CHANGE UP
BASOPHILS # BLD AUTO: 0.04 K/UL — SIGNIFICANT CHANGE UP (ref 0–0.2)
BASOPHILS NFR BLD AUTO: 0.7 % — SIGNIFICANT CHANGE UP (ref 0–2)
BILIRUB SERPL-MCNC: 0.3 MG/DL — LOW (ref 0.4–2)
BLD GP AB SCN SERPL QL: SIGNIFICANT CHANGE UP
BUN SERPL-MCNC: 24.2 MG/DL — HIGH (ref 8–20)
CALCIUM SERPL-MCNC: 8.9 MG/DL — SIGNIFICANT CHANGE UP (ref 8.6–10.2)
CHLORIDE SERPL-SCNC: 102 MMOL/L — SIGNIFICANT CHANGE UP (ref 98–107)
CO2 SERPL-SCNC: 27 MMOL/L — SIGNIFICANT CHANGE UP (ref 22–29)
CREAT SERPL-MCNC: 0.64 MG/DL — SIGNIFICANT CHANGE UP (ref 0.5–1.3)
EGFR: 98 ML/MIN/1.73M2 — SIGNIFICANT CHANGE UP
EOSINOPHIL # BLD AUTO: 0.11 K/UL — SIGNIFICANT CHANGE UP (ref 0–0.5)
EOSINOPHIL NFR BLD AUTO: 2 % — SIGNIFICANT CHANGE UP (ref 0–6)
ESTIMATED AVERAGE GLUCOSE: 117 MG/DL — HIGH (ref 68–114)
GLUCOSE SERPL-MCNC: 103 MG/DL — HIGH (ref 70–99)
HCT VFR BLD CALC: 37.6 % — SIGNIFICANT CHANGE UP (ref 34.5–45)
HGB BLD-MCNC: 12.2 G/DL — SIGNIFICANT CHANGE UP (ref 11.5–15.5)
IMM GRANULOCYTES NFR BLD AUTO: 0.4 % — SIGNIFICANT CHANGE UP (ref 0–1.5)
INR BLD: 1.03 RATIO — SIGNIFICANT CHANGE UP (ref 0.88–1.16)
LYMPHOCYTES # BLD AUTO: 1.04 K/UL — SIGNIFICANT CHANGE UP (ref 1–3.3)
LYMPHOCYTES # BLD AUTO: 19.3 % — SIGNIFICANT CHANGE UP (ref 13–44)
MAGNESIUM SERPL-MCNC: 1.9 MG/DL — SIGNIFICANT CHANGE UP (ref 1.6–2.6)
MCHC RBC-ENTMCNC: 29.5 PG — SIGNIFICANT CHANGE UP (ref 27–34)
MCHC RBC-ENTMCNC: 32.4 GM/DL — SIGNIFICANT CHANGE UP (ref 32–36)
MCV RBC AUTO: 90.8 FL — SIGNIFICANT CHANGE UP (ref 80–100)
MONOCYTES # BLD AUTO: 0.6 K/UL — SIGNIFICANT CHANGE UP (ref 0–0.9)
MONOCYTES NFR BLD AUTO: 11.2 % — SIGNIFICANT CHANGE UP (ref 2–14)
NEUTROPHILS # BLD AUTO: 3.57 K/UL — SIGNIFICANT CHANGE UP (ref 1.8–7.4)
NEUTROPHILS NFR BLD AUTO: 66.4 % — SIGNIFICANT CHANGE UP (ref 43–77)
PHOSPHATE SERPL-MCNC: 2.8 MG/DL — SIGNIFICANT CHANGE UP (ref 2.4–4.7)
PLATELET # BLD AUTO: 187 K/UL — SIGNIFICANT CHANGE UP (ref 150–400)
POTASSIUM SERPL-MCNC: 4.6 MMOL/L — SIGNIFICANT CHANGE UP (ref 3.5–5.3)
POTASSIUM SERPL-SCNC: 4.6 MMOL/L — SIGNIFICANT CHANGE UP (ref 3.5–5.3)
PROT SERPL-MCNC: 6.7 G/DL — SIGNIFICANT CHANGE UP (ref 6.6–8.7)
PROTHROM AB SERPL-ACNC: 11.9 SEC — SIGNIFICANT CHANGE UP (ref 10.5–13.4)
RBC # BLD: 4.14 M/UL — SIGNIFICANT CHANGE UP (ref 3.8–5.2)
RBC # FLD: 16.9 % — HIGH (ref 10.3–14.5)
SODIUM SERPL-SCNC: 138 MMOL/L — SIGNIFICANT CHANGE UP (ref 135–145)
WBC # BLD: 5.38 K/UL — SIGNIFICANT CHANGE UP (ref 3.8–10.5)
WBC # FLD AUTO: 5.38 K/UL — SIGNIFICANT CHANGE UP (ref 3.8–10.5)

## 2022-05-26 PROCEDURE — G0463: CPT

## 2022-05-26 PROCEDURE — 93010 ELECTROCARDIOGRAM REPORT: CPT

## 2022-05-26 PROCEDURE — 93005 ELECTROCARDIOGRAM TRACING: CPT

## 2022-05-26 NOTE — H&P PST ADULT - PROBLEM SELECTOR PLAN 4
Caprini Score Caprini Score 7 High risk,  Surgical team should assess /Strongly recommend pharmacological and mechanical measures for VTE prophylaxis

## 2022-05-26 NOTE — H&P PST ADULT - NSICDXPASTSURGICALHX_GEN_ALL_CORE_FT
PAST SURGICAL HISTORY:  Cochlear implant status 2009    H/O bilateral salpingo-oophorectomy 2018    H/O myomectomy 2000    H/O tubal ligation 1993

## 2022-05-26 NOTE — H&P PST ADULT - EKG AND INTERPRETATION
NSR @ 67, ?LVH, septal infarct EKG reviewed, no acute changes, official reading pending. NSR @ 67, ?LVH, septal infarct EKG reviewed, no acute changes, official reading pending. Discussed the EKG with Dr. Gong from anesthesia, patient has No cardiac History or cardiac symptoms, last EKG from 2021, not changed, Medical Clearance pending, no cardiac clearance indicated at this time .GREGG PEREZ

## 2022-05-26 NOTE — H&P PST ADULT - PROBLEM SELECTOR PLAN 6
Total Abdominal Hysterectomy bilateral salpingo oophorectomy Omentectomy possible debulking, HIPEC with Cisplatin by dr. Flores on 6/6/22. Covid vaccine series completed, card in chart, (Pf[employer] X3) covid test is on 6/3/22. Medical Clearance pending

## 2022-05-26 NOTE — H&P PST ADULT - GENITOURINARY
[FreeTextEntry1] : - Referral provided for: Orthopedics\par - Referral provided for: Physical Therapy \par - Script provided for: Xray Shoulder 2 Views\par - Adv to ice and rest shoulder\par - Adv to keep watching bug bite\par - Education provided for info regarding impingement syndrome \par - Enc to use Voltaren gel on affected area\par - F/u in June 2022 negative

## 2022-05-26 NOTE — H&P PST ADULT - ASSESSMENT
CAPRINI VTE 2.0 SCORE [CLOT updated 2019]    AGE RELATED RISK FACTORS                                                       MOBILITY RELATED FACTORS  [ ] Age 41-60 years                                            (1 Point)                    [ ] Bed rest                                                        (1 Point)  [ ] Age: 61-74 years                                           (2 Points)                  [ ] Plaster cast                                                   (2 Points)  [ ] Age= 75 years                                              (3 Points)                    [ ] Bed bound for more than 72 hours                 (2 Points)    DISEASE RELATED RISK FACTORS                                               GENDER SPECIFIC FACTORS  [ ] Edema in the lower extremities                       (1 Point)              [ ] Pregnancy                                                     (1 Point)  [ ] Varicose veins                                               (1 Point)                     [ ] Post-partum < 6 weeks                                   (1 Point)             [ ] BMI > 25 Kg/m2                                            (1 Point)                     [ ] Hormonal therapy  or oral contraception          (1 Point)                 [ ] Sepsis (in the previous month)                        (1 Point)               [ ] History of pregnancy complications                 (1 point)  [ ] Pneumonia or serious lung disease                                               [ ] Unexplained or recurrent                     (1 Point)           (in the previous month)                               (1 Point)  [ ] Abnormal pulmonary function test                     (1 Point)                 SURGERY RELATED RISK FACTORS  [ ] Acute myocardial infarction                              (1 Point)               [ ]  Section                                             (1 Point)  [ ] Congestive heart failure (in the previous month)  (1 Point)      [ ] Minor surgery                                                  (1 Point)   [ ] Inflammatory bowel disease                             (1 Point)               [ ] Arthroscopic surgery                                        (2 Points)  [ ] Central venous access                                      (2 Points)                [ ] General surgery lasting more than 45 minutes (2 points)  [ ] Malignancy- Present or previous                   (2 Points)                [ ] Elective arthroplasty                                         (5 points)    [ ] Stroke (in the previous month)                          (5 Points)                                                                                                                                                           HEMATOLOGY RELATED FACTORS                                                 TRAUMA RELATED RISK FACTORS  [ ] Prior episodes of VTE                                     (3 Points)                [ ] Fracture of the hip, pelvis, or leg                       (5 Points)  [ ] Positive family history for VTE                         (3 Points)             [ ] Acute spinal cord injury (in the previous month)  (5 Points)  [ ] Prothrombin 31110 A                                     (3 Points)               [ ] Paralysis  (less than 1 month)                             (5 Points)  [ ] Factor V Leiden                                             (3 Points)                  [ ] Multiple Trauma within 1 month                        (5 Points)  [ ] Lupus anticoagulants                                     (3 Points)                                                           [ ] Anticardiolipin antibodies                               (3 Points)                                                       [ ] High homocysteine in the blood                      (3 Points)                                             [ ] Other congenital or acquired thrombophilia      (3 Points)                                                [ ] Heparin induced thrombocytopenia                  (3 Points)                                     Total Score [          ]  OPIOID RISK TOOL    ALFONSO EACH BOX THAT APPLIES AND ADD TOTALS AT THE END    FAMILY HISTORY OF SUBSTANCE ABUSE                 FEMALE         MALE                                                Alcohol                             [  ]1 pt          [  ]3pts                                               Illegal Drugs                     [  ]2 pts        [  ]3pts                                               Rx Drugs                           [  ]4 pts        [  ]4 pts    PERSONAL HISTORY OF SUBSTANCE ABUSE                                                                                          Alcohol                             [  ]3 pts       [  ]3 pts                                               Illegal Drugs                     [  ]4 pts        [  ]4 pts                                               Rx Drugs                           [  ]5 pts        [  ]5 pts    AGE BETWEEN 16-45 YEARS                                      [  ]1 pt         [  ]1 pt    HISTORY OF PREADOLESCENT   SEXUAL ABUSE                                                             [  ]3 pts        [  ]0pts    PSYCHOLOGICAL DISEASE                     ADD, OCD, Bipolar, Schizophrenia        [  ]2 pts         [  ]2 pts                      Depression                                               [  ]1 pt           [  ]1 pt           SCORING TOTAL   (add numbers and type here)              (***)                                     A score of 3 or lower indicated LOW risk for future opioid abuse  A score of 4 to 7 indicated moderate risk for future opioid abuse  A score of 8 or higher indicates a high risk for opioid abuse This is a 67 y/o female with Stage IIIC serous carcinoma diagnosed  in 2022, Patient w/ a hx of prophylactic lap BSO in 2018 for 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 Patient had a CT A/P 2022 revealing multiple lobular soft tissue masses involving the omentum most pronounced on the R. side adjacent to ascending colon and cecum. Additionally nodularity adj to tip of cecum involving peritoneum. 2.9cm left hemipelvis cyst suspicious for ovarian neoplasia. Cannot exclude peritoneal or GI malignancy. CT guided bx of omentum right nodule core bx which revealed Poorly differentiated carcinoma consistent with serous carcinoma. She completed 3 cycles of chemotherapy, now she is scheduled for a Total Abdominal Hysterectomy bilateral salpingo oophorectomy Omentectomy possible debulking, HIPEC with Cisplatin by dr. Flores on 22. Covid vaccine series completed, card in chart, (Pfizer X3) covid test is on 6/3/22. Medical Clearance pending     medications reviewed, instructions given on what medications to take and what not to take. She can continue her Nexium and Sertraline in the AM of surgery. ERP teaching given, Asked the pt not to take any NSAID's 5-7 days before surgery and told the pt Tylenol is okay to take for pain, pt verbalized understanding. pt is not taking ASA/Plavix/Anticoagulation medication at this time. All other meds can be continued till the night before surgery.     CAPRINI VTE 2.0 SCORE [CLOT updated 2019]    AGE RELATED RISK FACTORS                                                       MOBILITY RELATED FACTORS  [ ] Age 41-60 years                                            (1 Point)                    [ ] Bed rest                                                        (1 Point)  [x ] Age: 61-74 years                                           (2 Points)                  [ ] Plaster cast                                                   (2 Points)  [ ] Age= 75 years                                              (3 Points)                    [ ] Bed bound for more than 72 hours                 (2 Points)    DISEASE RELATED RISK FACTORS                                               GENDER SPECIFIC FACTORS  [ ] Edema in the lower extremities                       (1 Point)              [ ] Pregnancy                                                     (1 Point)  [ ] Varicose veins                                               (1 Point)                     [ ] Post-partum < 6 weeks                                   (1 Point)             [x ] BMI > 25 Kg/m2                                            (1 Point)                     [ ] Hormonal therapy  or oral contraception          (1 Point)                 [ ] Sepsis (in the previous month)                        (1 Point)               [ ] History of pregnancy complications                 (1 point)  [ ] Pneumonia or serious lung disease                                               [ ] Unexplained or recurrent                     (1 Point)           (in the previous month)                               (1 Point)  [ ] Abnormal pulmonary function test                     (1 Point)                 SURGERY RELATED RISK FACTORS  [ ] Acute myocardial infarction                              (1 Point)               [ ]  Section                                             (1 Point)  [ ] Congestive heart failure (in the previous month)  (1 Point)      [ ] Minor surgery                                                  (1 Point)   [ ] Inflammatory bowel disease                             (1 Point)               [ ] Arthroscopic surgery                                        (2 Points)  [ ] Central venous access                                      (2 Points)                [ x] General surgery lasting more than 45 minutes (2 points)  [x ] Malignancy- Present or previous                   (2 Points)                [ ] Elective arthroplasty                                         (5 points)    [ ] Stroke (in the previous month)                          (5 Points)                                                                                                                                                           HEMATOLOGY RELATED FACTORS                                                 TRAUMA RELATED RISK FACTORS  [ ] Prior episodes of VTE                                     (3 Points)                [ ] Fracture of the hip, pelvis, or leg                       (5 Points)  [ ] Positive family history for VTE                         (3 Points)             [ ] Acute spinal cord injury (in the previous month)  (5 Points)  [ ] Prothrombin 58928 A                                     (3 Points)               [ ] Paralysis  (less than 1 month)                             (5 Points)  [ ] Factor V Leiden                                             (3 Points)                  [ ] Multiple Trauma within 1 month                        (5 Points)  [ ] Lupus anticoagulants                                     (3 Points)                                                           [ ] Anticardiolipin antibodies                               (3 Points)                                                       [ ] High homocysteine in the blood                      (3 Points)                                             [ ] Other congenital or acquired thrombophilia      (3 Points)                                                [ ] Heparin induced thrombocytopenia                  (3 Points)                                     Total Score [  7        ]  OPIOID RISK TOOL    ALFONSO EACH BOX THAT APPLIES AND ADD TOTALS AT THE END    FAMILY HISTORY OF SUBSTANCE ABUSE                 FEMALE         MALE                                                Alcohol                             [  ]1 pt          [  ]3pts                                               Illegal Drugs                     [  ]2 pts        [  ]3pts                                               Rx Drugs                           [  ]4 pts        [  ]4 pts    PERSONAL HISTORY OF SUBSTANCE ABUSE                                                                                          Alcohol                             [  ]3 pts       [  ]3 pts                                               Illegal Drugs                     [  ]4 pts        [  ]4 pts                                               Rx Drugs                           [  ]5 pts        [  ]5 pts    AGE BETWEEN 16-45 YEARS                                      [  ]1 pt         [  ]1 pt    HISTORY OF PREADOLESCENT   SEXUAL ABUSE                                                             [  ]3 pts        [  ]0pts    PSYCHOLOGICAL DISEASE                     ADD, OCD, Bipolar, Schizophrenia        [  ]2 pts         [  ]2 pts                      Depression                                               [  x]1 pt           [  ]1 pt           SCORING TOTAL   (add numbers and type here)              ( 1)                                     A score of 3 or lower indicated LOW risk for future opioid abuse  A score of 4 to 7 indicated moderate risk for future opioid abuse  A score of 8 or higher indicates a high risk for opioid abuse

## 2022-05-26 NOTE — H&P PST ADULT - HISTORY OF PRESENT ILLNESS
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 Patient had a CT A/P 1/2022 revealing multiple lobular soft tissue masses involving the omentum most pronounced on the R. side adjacent to ascending colon and cecum. Additionally nodularity adj to tip of cecum involving peritoneum. 2.9cm left hemipelvis cyst suspicious for ovarian neoplasia. Cannot exclude peritoneal or GI malignancy. CT guided bx of omentum right nodule core bx which revealed Poorly differentiated carcinoma consistent with serous carcinoma. She completed 3 cycles of chemotherapy, now she is scheduled for a Total Abdominal Hysterectomy bilateral salpingo oophorectomy Omentectomy possible debulking, HIPEC with Cisplatin by dr. Flores on 6/6/22. Covid vaccine series completed, card in chart, (Pfizer X3) covid test is on 6/3/22. Medical Clearance pending     Ca125 4/8/22 was 13    CT C/A/P 5/6/22 IMPRESSION:  No gross evidence for carcinomatosis. 2.3 x 1.2 cm fluid density in the expected location of left adnexa this patient status post bilateral salpingo-oophorectomy. This may represent a small seroma or lymphocele. Minimal hazy soft tissue stranding in the supra colic omentum at site of previously visualized omental implant.

## 2022-05-26 NOTE — H&P PST ADULT - NSANTHOSAYNRD_GEN_A_CORE
No. MORE screening performed.  STOP BANG Legend: 0-2 = LOW Risk; 3-4 = INTERMEDIATE Risk; 5-8 = HIGH Risk

## 2022-05-26 NOTE — H&P PST ADULT - NSICDXPASTMEDICALHX_GEN_ALL_CORE_FT
PAST MEDICAL HISTORY:  Chronic GERD     Depression, reactive     Hyperlipidemia     Hypertension

## 2022-06-02 ENCOUNTER — TRANSCRIPTION ENCOUNTER (OUTPATIENT)
Age: 67
End: 2022-06-02

## 2022-06-02 NOTE — RESULTS/DATA
[] : results reviewed [de-identified] : unremarkable  [de-identified] : unremarkable  [de-identified] : a bit dehydrated otherwise unremarkable  [de-identified] : NSR  [de-identified] : ALL PENDING \par ADD: 2/6/22  LABS reviewed: \par \par A 1 c 5.7 \par O pos; AB screen negative

## 2022-06-02 NOTE — REVIEW OF SYSTEMS
[Fatigue] : fatigue [Joint Stiffness] : joint stiffness [Muscle Pain] : muscle pain [Anxiety] : anxiety [Negative] : Respiratory [Fever] : no fever [Night Sweats] : no night sweats [Discharge] : no discharge [Vision Problems] : no vision problems [Constipation] : no constipation [Vomiting] : no vomiting [Itching] : no itching [Skin Rash] : no skin rash [Headache] : no headache [Fainting] : no fainting [Memory Loss] : no memory loss [Depression] : no depression [Easy Bruising] : no easy bruising [FreeTextEntry7] : taste buds altered and appetite down  [FreeTextEntry9] : legs weak "can't stand for more than 5 minutes"

## 2022-06-02 NOTE — ASSESSMENT
[Patient Requires Further Testing] : Patient requires further testing [FreeTextEntry4] : Pending labs EKG review: \par ADD: 2/6 Labs reviewed as noted.\par  At this time, I see no absolute  contraindication for proposed procedure  PENDING results of COVID testing. \par Reviewed, and advised no aspirin, NSAIDs, fish oil vitamin E. one week prior to procedure.\par If you take BP medication, take the AM of surgery with small sip of water. \par Advised to ensure normal BM day prior to surgery.\par Strategies to achieve reviewed. \par \par Best wishes offered.\par \par \par \par \par \par

## 2022-06-02 NOTE — PHYSICAL EXAM
[No Acute Distress] : no acute distress [Normal Sclera/Conjunctiva] : normal sclera/conjunctiva [No JVD] : no jugular venous distention [No Respiratory Distress] : no respiratory distress  [Clear to Auscultation] : lungs were clear to auscultation bilaterally [Normal Rate] : normal rate  [Regular Rhythm] : with a regular rhythm [Normal S1, S2] : normal S1 and S2 [No Edema] : there was no peripheral edema [Soft] : abdomen soft [No Masses] : no abdominal mass palpated [Normal Supraclavicular Nodes] : no supraclavicular lymphadenopathy [No CVA Tenderness] : no CVA  tenderness [No Joint Swelling] : no joint swelling [No Rash] : no rash [No Focal Deficits] : no focal deficits [Speech Grossly Normal] : speech grossly normal [Alert and Oriented x3] : oriented to person, place, and time [de-identified] : engaging   calm  [de-identified] : NANY  [de-identified] : no tremors

## 2022-06-02 NOTE — HISTORY OF PRESENT ILLNESS
[(Patient denies any chest pain, claudication, dyspnea on exertion, orthopnea, palpitations or syncope)] : Patient denies any chest pain, claudication, dyspnea on exertion, orthopnea, palpitations or syncope [Moderate (4-6 METs)] : Moderate (4-6 METs) [Aortic Stenosis] : no aortic stenosis [Atrial Fibrillation] : no atrial fibrillation [Coronary Artery Disease] : no coronary artery disease [Recent Myocardial Infarction] : no recent myocardial infarction [Implantable Device/Pacemaker] : no implantable device/pacemaker [Asthma] : no asthma [COPD] : no COPD [Sleep Apnea] : no sleep apnea [Smoker] : not a smoker [Chronic Anticoagulation] : no chronic anticoagulation [Chronic Kidney Disease] : no chronic kidney disease [Diabetes] : no diabetes [FreeTextEntry1] : Hysterectomy [FreeTextEntry2] : 6/6/22 [FreeTextEntry3] : Dr. Jone Flores [FreeTextEntry4] : COVID +  5/ 17/ 2022  treated with Paxlovid and fully recovered.  Procedure was postponed .\par SS  \par  [phone]\par \par 66 year old WF with PMH HTN / HLD/GERD/Barretts/Anxiety and congenital hearing loss all seemingly stable.\par KEITH omentectomy, possible radical debulking and  HIPEC w/cisplatin\par Now anticipating

## 2022-06-06 ENCOUNTER — INPATIENT (INPATIENT)
Facility: HOSPITAL | Age: 67
LOS: 1 days | Discharge: HOME | DRG: 357 | End: 2022-06-08
Attending: OBSTETRICS & GYNECOLOGY | Admitting: OBSTETRICS & GYNECOLOGY
Payer: MEDICARE

## 2022-06-06 ENCOUNTER — RESULT REVIEW (OUTPATIENT)
Age: 67
End: 2022-06-06

## 2022-06-06 ENCOUNTER — TRANSCRIPTION ENCOUNTER (OUTPATIENT)
Age: 67
End: 2022-06-06

## 2022-06-06 VITALS
HEIGHT: 64 IN | DIASTOLIC BLOOD PRESSURE: 76 MMHG | SYSTOLIC BLOOD PRESSURE: 139 MMHG | HEART RATE: 83 BPM | RESPIRATION RATE: 16 BRPM | WEIGHT: 149.91 LBS | OXYGEN SATURATION: 99 % | TEMPERATURE: 97 F

## 2022-06-06 DIAGNOSIS — Z98.51 TUBAL LIGATION STATUS: Chronic | ICD-10-CM

## 2022-06-06 DIAGNOSIS — C56.9 MALIGNANT NEOPLASM OF UNSPECIFIED OVARY: ICD-10-CM

## 2022-06-06 DIAGNOSIS — Z90.722 ACQUIRED ABSENCE OF OVARIES, BILATERAL: Chronic | ICD-10-CM

## 2022-06-06 DIAGNOSIS — Z98.890 OTHER SPECIFIED POSTPROCEDURAL STATES: Chronic | ICD-10-CM

## 2022-06-06 DIAGNOSIS — Z96.21 COCHLEAR IMPLANT STATUS: Chronic | ICD-10-CM

## 2022-06-06 LAB
ABO RH CONFIRMATION: SIGNIFICANT CHANGE UP
GLUCOSE BLDC GLUCOMTR-MCNC: 120 MG/DL — HIGH (ref 70–99)
GLUCOSE BLDC GLUCOMTR-MCNC: 137 MG/DL — HIGH (ref 70–99)
GLUCOSE BLDC GLUCOMTR-MCNC: 90 MG/DL — SIGNIFICANT CHANGE UP (ref 70–99)
SARS-COV-2 N GENE NPH QL NAA+PROBE: DETECTED

## 2022-06-06 PROCEDURE — 88304 TISSUE EXAM BY PATHOLOGIST: CPT | Mod: 26

## 2022-06-06 PROCEDURE — 88307 TISSUE EXAM BY PATHOLOGIST: CPT | Mod: 26

## 2022-06-06 PROCEDURE — 58956 BSO OMENTECTOMY W/TAH: CPT

## 2022-06-06 DEVICE — HEMOCLIP LG ORANGE 6 CARTRIDGE TITANIUM: Type: IMPLANTABLE DEVICE | Status: FUNCTIONAL

## 2022-06-06 RX ORDER — DEXTROSE MONOHYDRATE, SODIUM CHLORIDE, AND POTASSIUM CHLORIDE 50; .745; 4.5 G/1000ML; G/1000ML; G/1000ML
1000 INJECTION, SOLUTION INTRAVENOUS
Refills: 0 | Status: DISCONTINUED | OUTPATIENT
Start: 2022-06-06 | End: 2022-06-07

## 2022-06-06 RX ORDER — SENNA PLUS 8.6 MG/1
1 TABLET ORAL AT BEDTIME
Refills: 0 | Status: DISCONTINUED | OUTPATIENT
Start: 2022-06-06 | End: 2022-06-06

## 2022-06-06 RX ORDER — CISPLATIN 1 MG/ML
43 INJECTION, SOLUTION INTRAVENOUS ONCE
Refills: 0 | Status: DISCONTINUED | OUTPATIENT
Start: 2022-06-06 | End: 2022-06-06

## 2022-06-06 RX ORDER — PANTOPRAZOLE SODIUM 20 MG/1
40 TABLET, DELAYED RELEASE ORAL
Refills: 0 | Status: DISCONTINUED | OUTPATIENT
Start: 2022-06-06 | End: 2022-06-08

## 2022-06-06 RX ORDER — SODIUM THIOSULFATE
21 CRYSTALS MISCELLANEOUS ONCE
Refills: 0 | Status: DISCONTINUED | OUTPATIENT
Start: 2022-06-06 | End: 2022-06-06

## 2022-06-06 RX ORDER — ACETAMINOPHEN 500 MG
975 TABLET ORAL ONCE
Refills: 0 | Status: COMPLETED | OUTPATIENT
Start: 2022-06-06 | End: 2022-06-06

## 2022-06-06 RX ORDER — SODIUM CHLORIDE 9 MG/ML
1000 INJECTION, SOLUTION INTRAVENOUS
Refills: 0 | Status: DISCONTINUED | OUTPATIENT
Start: 2022-06-06 | End: 2022-06-06

## 2022-06-06 RX ORDER — CISPLATIN 1 MG/ML
42 INJECTION, SOLUTION INTRAVENOUS ONCE
Refills: 0 | Status: DISCONTINUED | OUTPATIENT
Start: 2022-06-06 | End: 2022-06-06

## 2022-06-06 RX ORDER — ONDANSETRON 8 MG/1
4 TABLET, FILM COATED ORAL EVERY 6 HOURS
Refills: 0 | Status: DISCONTINUED | OUTPATIENT
Start: 2022-06-06 | End: 2022-06-08

## 2022-06-06 RX ORDER — SERTRALINE 25 MG/1
50 TABLET, FILM COATED ORAL DAILY
Refills: 0 | Status: DISCONTINUED | OUTPATIENT
Start: 2022-06-06 | End: 2022-06-08

## 2022-06-06 RX ORDER — LOSARTAN POTASSIUM 100 MG/1
50 TABLET, FILM COATED ORAL DAILY
Refills: 0 | Status: DISCONTINUED | OUTPATIENT
Start: 2022-06-07 | End: 2022-06-08

## 2022-06-06 RX ORDER — FENTANYL CITRATE 50 UG/ML
50 INJECTION INTRAVENOUS
Refills: 0 | Status: DISCONTINUED | OUTPATIENT
Start: 2022-06-06 | End: 2022-06-06

## 2022-06-06 RX ORDER — SODIUM THIOSULFATE
15 CRYSTALS MISCELLANEOUS ONCE
Refills: 0 | Status: DISCONTINUED | OUTPATIENT
Start: 2022-06-06 | End: 2022-06-06

## 2022-06-06 RX ORDER — METRONIDAZOLE 500 MG
500 TABLET ORAL ONCE
Refills: 0 | Status: COMPLETED | OUTPATIENT
Start: 2022-06-06 | End: 2022-06-06

## 2022-06-06 RX ORDER — ACETAMINOPHEN 500 MG
975 TABLET ORAL EVERY 6 HOURS
Refills: 0 | Status: DISCONTINUED | OUTPATIENT
Start: 2022-06-06 | End: 2022-06-08

## 2022-06-06 RX ORDER — HYDROMORPHONE HYDROCHLORIDE 2 MG/ML
0.5 INJECTION INTRAMUSCULAR; INTRAVENOUS; SUBCUTANEOUS
Refills: 0 | Status: DISCONTINUED | OUTPATIENT
Start: 2022-06-06 | End: 2022-06-06

## 2022-06-06 RX ORDER — FENTANYL CITRATE 50 UG/ML
30 INJECTION INTRAVENOUS
Refills: 0 | Status: DISCONTINUED | OUTPATIENT
Start: 2022-06-06 | End: 2022-06-07

## 2022-06-06 RX ORDER — KETOROLAC TROMETHAMINE 30 MG/ML
30 SYRINGE (ML) INJECTION EVERY 6 HOURS
Refills: 0 | Status: DISCONTINUED | OUTPATIENT
Start: 2022-06-06 | End: 2022-06-07

## 2022-06-06 RX ORDER — ONDANSETRON 8 MG/1
4 TABLET, FILM COATED ORAL ONCE
Refills: 0 | Status: DISCONTINUED | OUTPATIENT
Start: 2022-06-06 | End: 2022-06-06

## 2022-06-06 RX ORDER — SODIUM CHLORIDE 9 MG/ML
3 INJECTION INTRAMUSCULAR; INTRAVENOUS; SUBCUTANEOUS EVERY 8 HOURS
Refills: 0 | Status: DISCONTINUED | OUTPATIENT
Start: 2022-06-06 | End: 2022-06-06

## 2022-06-06 RX ORDER — CELECOXIB 200 MG/1
200 CAPSULE ORAL ONCE
Refills: 0 | Status: COMPLETED | OUTPATIENT
Start: 2022-06-06 | End: 2022-06-06

## 2022-06-06 RX ORDER — ATORVASTATIN CALCIUM 80 MG/1
20 TABLET, FILM COATED ORAL AT BEDTIME
Refills: 0 | Status: DISCONTINUED | OUTPATIENT
Start: 2022-06-06 | End: 2022-06-08

## 2022-06-06 RX ORDER — ENOXAPARIN SODIUM 100 MG/ML
40 INJECTION SUBCUTANEOUS EVERY 24 HOURS
Refills: 0 | Status: DISCONTINUED | OUTPATIENT
Start: 2022-06-06 | End: 2022-06-08

## 2022-06-06 RX ORDER — CEFAZOLIN SODIUM 1 G
2000 VIAL (EA) INJECTION ONCE
Refills: 0 | Status: COMPLETED | OUTPATIENT
Start: 2022-06-06 | End: 2022-06-06

## 2022-06-06 RX ADMIN — FENTANYL CITRATE 30 MILLILITER(S): 50 INJECTION INTRAVENOUS at 19:23

## 2022-06-06 RX ADMIN — ATORVASTATIN CALCIUM 20 MILLIGRAM(S): 80 TABLET, FILM COATED ORAL at 21:07

## 2022-06-06 RX ADMIN — Medication 975 MILLIGRAM(S): at 21:04

## 2022-06-06 RX ADMIN — FENTANYL CITRATE 30 MILLILITER(S): 50 INJECTION INTRAVENOUS at 12:15

## 2022-06-06 RX ADMIN — Medication 100 MILLIGRAM(S): at 08:00

## 2022-06-06 RX ADMIN — Medication 30 MILLIGRAM(S): at 17:02

## 2022-06-06 RX ADMIN — Medication 975 MILLIGRAM(S): at 07:02

## 2022-06-06 RX ADMIN — Medication 200 MILLIGRAM(S): at 08:00

## 2022-06-06 RX ADMIN — FENTANYL CITRATE 30 MILLILITER(S): 50 INJECTION INTRAVENOUS at 11:30

## 2022-06-06 RX ADMIN — CELECOXIB 200 MILLIGRAM(S): 200 CAPSULE ORAL at 06:52

## 2022-06-06 RX ADMIN — Medication 30 MILLIGRAM(S): at 21:07

## 2022-06-06 RX ADMIN — ENOXAPARIN SODIUM 40 MILLIGRAM(S): 100 INJECTION SUBCUTANEOUS at 21:06

## 2022-06-06 NOTE — PROGRESS NOTE ADULT - ASSESSMENT
65yo s/p Supracervical hysterectomy BSO, omentectomy and debulking.  65yo s/p Supracervical hysterectomy BSO, omentectomy and JONE. 67yo s/p Ex-lap supracervical hysterectomy, omentectomy and JONE.

## 2022-06-06 NOTE — PROGRESS NOTE ADULT - SUBJECTIVE AND OBJECTIVE BOX
GYNECOLOGIC ONCOLOGY PROGRESS NOTE    POD#0    PROBLEMS:  Ovarian ca  Hypertension  Chronic GERD  Hyperlipidemia  Depression, reactive  Need for prophylactic measure    Pt seen and examined at bedside.     SUBJECTIVE:    Patient is without complaints.  Pain well-controlled.  Flatus: no  Denies Nausea, Vomiting or Diarrhea.  Denies shortness of breath, chest pain or dyspnea on exertion.  Tolerating diet.    OBJECTIVE:     VITALS:  T(F): 97.5 (06-06-22 @ 12:55), Max: 97.5 (06-06-22 @ 12:55)  HR: 55 (06-06-22 @ 12:55) (55 - 83)  BP: 128/69 (06-06-22 @ 12:55) (124/60 - 143/70)  RR: 18 (06-06-22 @ 12:55) (14 - 18)  SpO2: 98% (06-06-22 @ 12:55) (98% - 100%)    I&O's Summary  Sanchez-100cc's     MEDICATIONS  (STANDING):  acetaminophen     Tablet .. 975 milliGRAM(s) Oral every 6 hours  atorvastatin 20 milliGRAM(s) Oral at bedtime  enoxaparin Injectable 40 milliGRAM(s) SubCutaneous every 24 hours  fentaNYL PCA (50 MICROgram(s)/mL) 30 milliLiter(s) PCA Continuous PCA Continuous  ketorolac   Injectable 30 milliGRAM(s) IV Push every 6 hours  lactated ringers. 1000 milliLiter(s) (125 mL/Hr) IV Continuous <Continuous>  pantoprazole    Tablet 40 milliGRAM(s) Oral before breakfast  sertraline 50 milliGRAM(s) Oral daily    MEDICATIONS  (PRN):  ondansetron Injectable 4 milliGRAM(s) IV Push every 6 hours PRN Nausea and/or Vomiting    Physical Exam:  Constitutional: NAD  Pulmonary: clear to auscultation bilaterally   Cardiovascular: Regular rate and rhythm   Abdomen: soft, non-tender, non-distended, normal bowel sounds  Extremities: no lower extremity edema or calve tenderness, Haven's sign negative.  Incision: Clean, dry, prevena intact with good seal.  Without signs of infection or hernia.

## 2022-06-06 NOTE — BRIEF OPERATIVE NOTE - NSICDXBRIEFPROCEDURE_GEN_ALL_CORE_FT
PROCEDURES:  Exploratory laparotomy 06-Jun-2022 10:21:02  Jone Vines  Supracervical hysterectomy 06-Jun-2022 10:21:35  Jone Vines  Lysis of pelvic adhesions 06-Jun-2022 10:21:44  Jone Vines Y  Omentectomy 06-Jun-2022 10:22:27  Jone Vines

## 2022-06-06 NOTE — PROGRESS NOTE ADULT - PROBLEM SELECTOR PLAN 1
Continue percocet, tylenol and toradol for pain control  SCD's and Lovenox for DVT ppx  Ambulate as tolerated  Encourage IS use  Continue IVF at 125cc/hr  Reg DASH diet  Will remove magaña in AM for TOV if output remains adequate overnight  F/u AM labs  Will otherwise continue routine post-operative care

## 2022-06-06 NOTE — BRIEF OPERATIVE NOTE - OPERATION/FINDINGS
small uterus, surgically absent fallopian tubes and ovaries bilaterally. Sigmoid adhered to left side of uterus.

## 2022-06-07 LAB
ANION GAP SERPL CALC-SCNC: 12 MMOL/L — SIGNIFICANT CHANGE UP (ref 5–17)
BASOPHILS # BLD AUTO: 0.04 K/UL — SIGNIFICANT CHANGE UP (ref 0–0.2)
BASOPHILS NFR BLD AUTO: 0.5 % — SIGNIFICANT CHANGE UP (ref 0–2)
BUN SERPL-MCNC: 9.5 MG/DL — SIGNIFICANT CHANGE UP (ref 8–20)
CALCIUM SERPL-MCNC: 7.6 MG/DL — LOW (ref 8.6–10.2)
CHLORIDE SERPL-SCNC: 106 MMOL/L — SIGNIFICANT CHANGE UP (ref 98–107)
CO2 SERPL-SCNC: 19 MMOL/L — LOW (ref 22–29)
CREAT SERPL-MCNC: 0.53 MG/DL — SIGNIFICANT CHANGE UP (ref 0.5–1.3)
EGFR: 103 ML/MIN/1.73M2 — SIGNIFICANT CHANGE UP
EOSINOPHIL # BLD AUTO: 0.07 K/UL — SIGNIFICANT CHANGE UP (ref 0–0.5)
EOSINOPHIL NFR BLD AUTO: 0.9 % — SIGNIFICANT CHANGE UP (ref 0–6)
GLUCOSE SERPL-MCNC: 136 MG/DL — HIGH (ref 70–99)
HCT VFR BLD CALC: 31.7 % — LOW (ref 34.5–45)
HGB BLD-MCNC: 10.2 G/DL — LOW (ref 11.5–15.5)
IMM GRANULOCYTES NFR BLD AUTO: 0.3 % — SIGNIFICANT CHANGE UP (ref 0–1.5)
LYMPHOCYTES # BLD AUTO: 1.19 K/UL — SIGNIFICANT CHANGE UP (ref 1–3.3)
LYMPHOCYTES # BLD AUTO: 14.9 % — SIGNIFICANT CHANGE UP (ref 13–44)
MCHC RBC-ENTMCNC: 30.6 PG — SIGNIFICANT CHANGE UP (ref 27–34)
MCHC RBC-ENTMCNC: 32.2 GM/DL — SIGNIFICANT CHANGE UP (ref 32–36)
MCV RBC AUTO: 95.2 FL — SIGNIFICANT CHANGE UP (ref 80–100)
MONOCYTES # BLD AUTO: 0.6 K/UL — SIGNIFICANT CHANGE UP (ref 0–0.9)
MONOCYTES NFR BLD AUTO: 7.5 % — SIGNIFICANT CHANGE UP (ref 2–14)
NEUTROPHILS # BLD AUTO: 6.06 K/UL — SIGNIFICANT CHANGE UP (ref 1.8–7.4)
NEUTROPHILS NFR BLD AUTO: 75.9 % — SIGNIFICANT CHANGE UP (ref 43–77)
PLATELET # BLD AUTO: 118 K/UL — LOW (ref 150–400)
POTASSIUM SERPL-MCNC: 3.9 MMOL/L — SIGNIFICANT CHANGE UP (ref 3.5–5.3)
POTASSIUM SERPL-SCNC: 3.9 MMOL/L — SIGNIFICANT CHANGE UP (ref 3.5–5.3)
RBC # BLD: 3.33 M/UL — LOW (ref 3.8–5.2)
RBC # FLD: 17.2 % — HIGH (ref 10.3–14.5)
SODIUM SERPL-SCNC: 137 MMOL/L — SIGNIFICANT CHANGE UP (ref 135–145)
WBC # BLD: 7.98 K/UL — SIGNIFICANT CHANGE UP (ref 3.8–10.5)
WBC # FLD AUTO: 7.98 K/UL — SIGNIFICANT CHANGE UP (ref 3.8–10.5)

## 2022-06-07 RX ORDER — OXYCODONE HYDROCHLORIDE 5 MG/1
10 TABLET ORAL EVERY 4 HOURS
Refills: 0 | Status: DISCONTINUED | OUTPATIENT
Start: 2022-06-07 | End: 2022-06-08

## 2022-06-07 RX ORDER — OXYCODONE HYDROCHLORIDE 5 MG/1
5 TABLET ORAL EVERY 4 HOURS
Refills: 0 | Status: DISCONTINUED | OUTPATIENT
Start: 2022-06-07 | End: 2022-06-08

## 2022-06-07 RX ADMIN — Medication 975 MILLIGRAM(S): at 08:44

## 2022-06-07 RX ADMIN — SERTRALINE 50 MILLIGRAM(S): 25 TABLET, FILM COATED ORAL at 12:23

## 2022-06-07 RX ADMIN — FENTANYL CITRATE 30 MILLILITER(S): 50 INJECTION INTRAVENOUS at 07:14

## 2022-06-07 RX ADMIN — ENOXAPARIN SODIUM 40 MILLIGRAM(S): 100 INJECTION SUBCUTANEOUS at 22:19

## 2022-06-07 RX ADMIN — LOSARTAN POTASSIUM 50 MILLIGRAM(S): 100 TABLET, FILM COATED ORAL at 05:54

## 2022-06-07 RX ADMIN — ATORVASTATIN CALCIUM 20 MILLIGRAM(S): 80 TABLET, FILM COATED ORAL at 22:19

## 2022-06-07 RX ADMIN — Medication 30 MILLIGRAM(S): at 22:19

## 2022-06-07 RX ADMIN — Medication 975 MILLIGRAM(S): at 20:30

## 2022-06-07 RX ADMIN — PANTOPRAZOLE SODIUM 40 MILLIGRAM(S): 20 TABLET, DELAYED RELEASE ORAL at 08:45

## 2022-06-07 RX ADMIN — Medication 30 MILLIGRAM(S): at 17:27

## 2022-06-07 RX ADMIN — Medication 30 MILLIGRAM(S): at 12:23

## 2022-06-07 RX ADMIN — Medication 30 MILLIGRAM(S): at 05:54

## 2022-06-07 NOTE — PROGRESS NOTE ADULT - SUBJECTIVE AND OBJECTIVE BOX
Name: ANYA CABAN  MRN: [id number]  Date Admitted: 06-06-22 (1d)  Location: [facility] 2EST 2023 01  Attending: Admitting: Jone Flores    All: Cashews (Hives)  Charles (Hives)  tetracycline (Nausea)    Gynecology-Oncology Progress Note    ANYA CABAN is a 66y s/p exploratory laparotomy, supracervical hysterectomy and omentectomy, POD #1, HD #2.     PROBLEMS:  Ovarian ca  Hypertension  Chronic GERD  Hyperlipidemia  Depression, reactive  Need for prophylactic measure    SUBJECTIVE:  Pt seen and examined at bedside.   Patient is without complaints.  Pain well-controlled with PCA.  Tolerating PO intake without N/V. Denies flatus or BM.  Sanchez DCed this AM, TOV pending  Denies HA, dizziness, fevers, chills, CP, or SOB.    OBJECTIVE:   T(F): 98 (06-07-22 @ 05:00), Max: 98.1 (06-06-22 @ 17:10)  HR: 60 (06-07-22 @ 05:00) (55 - 76)  BP: 148/68 (06-07-22 @ 05:00) (123/66 - 148/68)  RR: 18 (06-07-22 @ 05:00) (14 - 18)  SpO2: 98% (06-07-22 @ 05:00) (97% - 100%)    06-06-22 @ 07:01  -  06-07-22 @ 07:00  --------------------------------------------------------  IN:    dextrose 5% + sodium chloride 0.45% w/ Additives: 1375 mL    Lactated Ringers: 150 mL  Total IN: 1525 mL    OUT:    Indwelling Catheter - Urethral (mL): 1550 mL  Total OUT: 1550 mL    Total NET: -25 mL    Physical Exam:  Constitutional: NAD, AOx3  Pulmonary: clear to auscultation bilaterally  Cardiovascular: Regular rate and rhythm   Abdomen: soft, non-tender, non-distended, normal bowel sounds  Extremities: no lower extremity edema or calve tenderness, Haven's sign negative. SCDs.  Incision: Prevena in place with good vacuum    LABS:                      10.2   7.98  )-----------( 118      ( 07 Jun 2022 07:04 )             31.7     HOSPITAL MEDS:  MEDICATIONS  (STANDING):  acetaminophen     Tablet .. 975 milliGRAM(s) Oral every 6 hours  atorvastatin 20 milliGRAM(s) Oral at bedtime  dextrose 5% + sodium chloride 0.45% with potassium chloride 20 mEq/L 1000 milliLiter(s) (125 mL/Hr) IV Continuous <Continuous>  enoxaparin Injectable 40 milliGRAM(s) SubCutaneous every 24 hours  fentaNYL PCA (50 MICROgram(s)/mL) 30 milliLiter(s) PCA Continuous PCA Continuous  ketorolac   Injectable 30 milliGRAM(s) IV Push every 6 hours  losartan 50 milliGRAM(s) Oral daily  pantoprazole    Tablet 40 milliGRAM(s) Oral before breakfast  sertraline 50 milliGRAM(s) Oral daily    MEDICATIONS  (PRN):  ondansetron Injectable 4 milliGRAM(s) IV Push every 6 hours PRN Nausea and/or Vomiting

## 2022-06-07 NOTE — PROGRESS NOTE ADULT - SUBJECTIVE AND OBJECTIVE BOX
Patient seen for afternoon rounds with Dr Flores. Patient is OOB to chair with her partner at bedside. She is tolerating regular diet. Has ambulated to the restroom and is voiding spontaneously. Pain is controlled on PO regimen. IVF have been discontinued Surgical findings were discussed in detail with patient, she is aware final recommendations will be made once final pathology is received. VSS, AM labs WNL.

## 2022-06-07 NOTE — PROGRESS NOTE ADULT - ASSESSMENT
66y s/p exploratory laparotomy, supracervical hysterectomy, lysis of adhesions, and omentectomy, POD #1, HD #2.     Cancer type: Ovarian Cancer  Cardiac: Hx of HTN well controlled on Losartan, will continue  Pulmonary: no active disease, incentive spirometer at bedside.   Neuro: pain well controlled with PCA. Plan to DC PCA and transition to PO pain medication   GI: tolerating regular diet, flatus pending  : UOP adequate overnight, magaña DCed this AM. TOV pending.    ID: specify which antibiotics and indication, if continuation from home meds specify this   Heme: SCDs when not ambulating, Lovenox for VTE prophylaxis. CBC wnl will continue with AM labs.   Skin: no active disease   Psych: no active problems   FEN: IVF at 100cc/hr, will discontinue. Electrolytes wnl. Will continue AM labs.   Dispo: continue inpatient management    d/w Dr. Flores   66y s/p exploratory laparotomy, supracervical hysterectomy, lysis of adhesions, and omentectomy, POD #1, HD #2.     Cancer type: Ovarian Cancer  Cardiac: Hx of HTN well controlled on Losartan, will continue  Pulmonary: no active disease, incentive spirometer at bedside.   Neuro: pain well controlled with PCA. Plan to DC PCA and transition to PO pain medication   GI: tolerating regular diet, flatus pending  : UOP adequate overnight, magaña DCed this AM. TOV pending.    Heme: SCDs when not ambulating, Lovenox for VTE prophylaxis. CBC wnl will continue with AM labs.   Skin: no active disease   Psych: no active problems   FEN: IVF at 100cc/hr, will discontinue. Electrolytes wnl. Will continue AM labs.   Dispo: continue inpatient management    d/w Dr. Flores

## 2022-06-08 ENCOUNTER — TRANSCRIPTION ENCOUNTER (OUTPATIENT)
Age: 67
End: 2022-06-08

## 2022-06-08 VITALS
DIASTOLIC BLOOD PRESSURE: 77 MMHG | RESPIRATION RATE: 18 BRPM | SYSTOLIC BLOOD PRESSURE: 147 MMHG | HEART RATE: 69 BPM | OXYGEN SATURATION: 97 % | TEMPERATURE: 99 F

## 2022-06-08 PROBLEM — E78.5 HYPERLIPIDEMIA, UNSPECIFIED: Chronic | Status: ACTIVE | Noted: 2022-05-26

## 2022-06-08 PROBLEM — F32.9 MAJOR DEPRESSIVE DISORDER, SINGLE EPISODE, UNSPECIFIED: Chronic | Status: ACTIVE | Noted: 2022-05-26

## 2022-06-08 PROBLEM — K21.9 GASTRO-ESOPHAGEAL REFLUX DISEASE WITHOUT ESOPHAGITIS: Chronic | Status: ACTIVE | Noted: 2022-05-26

## 2022-06-08 PROBLEM — I10 ESSENTIAL (PRIMARY) HYPERTENSION: Chronic | Status: ACTIVE | Noted: 2022-05-26

## 2022-06-08 LAB
ANION GAP SERPL CALC-SCNC: 8 MMOL/L — SIGNIFICANT CHANGE UP (ref 5–17)
BASOPHILS # BLD AUTO: 0.03 K/UL — SIGNIFICANT CHANGE UP (ref 0–0.2)
BASOPHILS NFR BLD AUTO: 0.5 % — SIGNIFICANT CHANGE UP (ref 0–2)
BUN SERPL-MCNC: 10.4 MG/DL — SIGNIFICANT CHANGE UP (ref 8–20)
CALCIUM SERPL-MCNC: 8.3 MG/DL — LOW (ref 8.6–10.2)
CHLORIDE SERPL-SCNC: 108 MMOL/L — HIGH (ref 98–107)
CO2 SERPL-SCNC: 26 MMOL/L — SIGNIFICANT CHANGE UP (ref 22–29)
CREAT SERPL-MCNC: 0.53 MG/DL — SIGNIFICANT CHANGE UP (ref 0.5–1.3)
EGFR: 103 ML/MIN/1.73M2 — SIGNIFICANT CHANGE UP
EOSINOPHIL # BLD AUTO: 0.25 K/UL — SIGNIFICANT CHANGE UP (ref 0–0.5)
EOSINOPHIL NFR BLD AUTO: 4.3 % — SIGNIFICANT CHANGE UP (ref 0–6)
GLUCOSE SERPL-MCNC: 99 MG/DL — SIGNIFICANT CHANGE UP (ref 70–99)
HCT VFR BLD CALC: 30.8 % — LOW (ref 34.5–45)
HGB BLD-MCNC: 10.2 G/DL — LOW (ref 11.5–15.5)
IMM GRANULOCYTES NFR BLD AUTO: 0.3 % — SIGNIFICANT CHANGE UP (ref 0–1.5)
LYMPHOCYTES # BLD AUTO: 0.99 K/UL — LOW (ref 1–3.3)
LYMPHOCYTES # BLD AUTO: 17.2 % — SIGNIFICANT CHANGE UP (ref 13–44)
MAGNESIUM SERPL-MCNC: 1.7 MG/DL — SIGNIFICANT CHANGE UP (ref 1.6–2.6)
MCHC RBC-ENTMCNC: 30.8 PG — SIGNIFICANT CHANGE UP (ref 27–34)
MCHC RBC-ENTMCNC: 33.1 GM/DL — SIGNIFICANT CHANGE UP (ref 32–36)
MCV RBC AUTO: 93.1 FL — SIGNIFICANT CHANGE UP (ref 80–100)
MONOCYTES # BLD AUTO: 0.5 K/UL — SIGNIFICANT CHANGE UP (ref 0–0.9)
MONOCYTES NFR BLD AUTO: 8.7 % — SIGNIFICANT CHANGE UP (ref 2–14)
NEUTROPHILS # BLD AUTO: 3.97 K/UL — SIGNIFICANT CHANGE UP (ref 1.8–7.4)
NEUTROPHILS NFR BLD AUTO: 69 % — SIGNIFICANT CHANGE UP (ref 43–77)
PHOSPHATE SERPL-MCNC: 2.8 MG/DL — SIGNIFICANT CHANGE UP (ref 2.4–4.7)
PLATELET # BLD AUTO: 127 K/UL — LOW (ref 150–400)
POTASSIUM SERPL-MCNC: 3.8 MMOL/L — SIGNIFICANT CHANGE UP (ref 3.5–5.3)
POTASSIUM SERPL-SCNC: 3.8 MMOL/L — SIGNIFICANT CHANGE UP (ref 3.5–5.3)
RBC # BLD: 3.31 M/UL — LOW (ref 3.8–5.2)
RBC # FLD: 16.8 % — HIGH (ref 10.3–14.5)
SODIUM SERPL-SCNC: 142 MMOL/L — SIGNIFICANT CHANGE UP (ref 135–145)
WBC # BLD: 5.76 K/UL — SIGNIFICANT CHANGE UP (ref 3.8–10.5)
WBC # FLD AUTO: 5.76 K/UL — SIGNIFICANT CHANGE UP (ref 3.8–10.5)

## 2022-06-08 PROCEDURE — 82962 GLUCOSE BLOOD TEST: CPT

## 2022-06-08 PROCEDURE — 80048 BASIC METABOLIC PNL TOTAL CA: CPT

## 2022-06-08 PROCEDURE — 84100 ASSAY OF PHOSPHORUS: CPT

## 2022-06-08 PROCEDURE — 85025 COMPLETE CBC W/AUTO DIFF WBC: CPT

## 2022-06-08 PROCEDURE — 36415 COLL VENOUS BLD VENIPUNCTURE: CPT

## 2022-06-08 PROCEDURE — 88307 TISSUE EXAM BY PATHOLOGIST: CPT

## 2022-06-08 PROCEDURE — 88304 TISSUE EXAM BY PATHOLOGIST: CPT

## 2022-06-08 PROCEDURE — 83735 ASSAY OF MAGNESIUM: CPT

## 2022-06-08 RX ORDER — OXYCODONE HYDROCHLORIDE 5 MG/1
1 TABLET ORAL
Qty: 4 | Refills: 0
Start: 2022-06-08 | End: 2022-06-08

## 2022-06-08 RX ORDER — ACETAMINOPHEN 500 MG
3 TABLET ORAL
Qty: 60 | Refills: 0
Start: 2022-06-08 | End: 2022-06-12

## 2022-06-08 RX ADMIN — LOSARTAN POTASSIUM 50 MILLIGRAM(S): 100 TABLET, FILM COATED ORAL at 05:35

## 2022-06-08 RX ADMIN — PANTOPRAZOLE SODIUM 40 MILLIGRAM(S): 20 TABLET, DELAYED RELEASE ORAL at 05:33

## 2022-06-08 NOTE — DISCHARGE NOTE PROVIDER - CARE PROVIDER_API CALL
Jone Flores)  Gynecologic Oncology; Obstetrics and Gynecology  [address] [place] [address]  Phone: [phone]  Fax: [phone]  Established Patient  Follow Up Time: 2 weeks

## 2022-06-08 NOTE — DISCHARGE NOTE NURSING/CASE MANAGEMENT/SOCIAL WORK - NSDCPEFALRISK_GEN_ALL_CORE
For information on Fall & Injury Prevention, visit: [email or web address] OR  [email or web address] OR  https://www.cdc.gov/steadi/patient.html

## 2022-06-08 NOTE — DISCHARGE NOTE PROVIDER - HOSPITAL COURSE
Patient underwent supracervical hysterectomy, JONE, omentectomy secondary to primary peritoneal carcinomatosis and serous carcinoma, undergoing chemotherapy. HIPEC had been planned but aborted due to failure of machine. Post operative labs, vital signs, and physical exam were reassuring. At time of discharge she was tolerating regular diet, ambulating and voiding without difficulty, pain was well controlled and she had no outstanding issues/complaints.

## 2022-06-08 NOTE — DISCHARGE NOTE NURSING/CASE MANAGEMENT/SOCIAL WORK - NSDCPETBCESMAN_GEN_ALL_CORE
Include Location In Plan?: Yes If you are a smoker, it is important for your health to stop smoking. Please be aware that second hand smoke is also harmful.

## 2022-06-08 NOTE — DISCHARGE NOTE PROVIDER - NSDCFUADDINST_GEN_ALL_CORE_FT
May walk and climb stairs as often as youd like, no vigorous activity, do not lift anything greater than 10lbs, nothing per vagina x 6 weeks, do not drive while on pain medication.   Please contact your provider for any pain uncontrolled by medication, excessive bleeding or Fever>100.4  Please take tylenol as needed for pain; oxycodone for breakthrough pain  Please follow-up via telehealth with PA in one week.  Follow-up with Dr. Flores in two weeks for post-op visit and to review pathology report.

## 2022-06-08 NOTE — PROGRESS NOTE ADULT - SUBJECTIVE AND OBJECTIVE BOX
GYNECOLOGIC ONCOLOGY PROGRESS NOTE    POD#2 from supracervical hyst, JONE, omentectomy, aborted HIPEC for primary peritoneal carcinoma Stage IIIC serous carcinoma    PROBLEMS:  Ovarian ca    Hypertension    Chronic GERD    Hyperlipidemia    Depression, reactive    Need for prophylactic measure    Bilateral hearing loss        Pt seen and examined at bedside.     SUBJECTIVE:    Patient is without complaints.  Pain well-controlled. OOB and ambulating independently  Flatus: +, no BM today  Denies Nausea, Vomiting or Diarrhea.  Denies shortness of breath, chest pain or dyspnea on exertion.  Tolerating diet.    OBJECTIVE:     VITALS:  T(F): 97.9 (06-08-22 @ 05:37), Max: 98.6 (06-07-22 @ 08:41)  HR: 68 (06-08-22 @ 05:37) (61 - 70)  BP: 132/70 (06-08-22 @ 05:37) (128/82 - 137/72)  RR: 16 (06-08-22 @ 05:37) (16 - 18)  SpO2: 97% (06-08-22 @ 05:37) (97% - 99%)  Wt(kg): --    I&O's Summary    07 Jun 2022 07:01  -  08 Jun 2022 07:00  --------------------------------------------------------  IN: 0 mL / OUT: 1300 mL / NET: -1300 mL        MEDICATIONS  (STANDING):  acetaminophen     Tablet .. 975 milliGRAM(s) Oral every 6 hours  atorvastatin 20 milliGRAM(s) Oral at bedtime  enoxaparin Injectable 40 milliGRAM(s) SubCutaneous every 24 hours  losartan 50 milliGRAM(s) Oral daily  pantoprazole    Tablet 40 milliGRAM(s) Oral before breakfast  sertraline 50 milliGRAM(s) Oral daily    MEDICATIONS  (PRN):  ondansetron Injectable 4 milliGRAM(s) IV Push every 6 hours PRN Nausea and/or Vomiting  oxyCODONE    IR 5 milliGRAM(s) Oral every 4 hours PRN Moderate Pain (4 - 6)  oxyCODONE    IR 10 milliGRAM(s) Oral every 4 hours PRN Severe Pain (7 - 10)      Physical Exam:  Constitutional: NAD  Pulmonary: clear to auscultation bilaterally   Cardiovascular: Regular rate and rhythm   Abdomen: soft, mild tenderness in RUQ, non-distended, normal bowel sounds  Extremities: no lower extremity edema or calve tenderness, Haven's sign negative.  Incision: Prevena in place with good suction    LABS:                        10.2   5.76  )-----------( 127      ( 08 Jun 2022 05:59 )             30.8     06-08    142  |  108<H>  |  10.4  ----------------------------<  99  3.8   |  26.0  |  0.53    Ca    8.3<L>      08 Jun 2022 05:59  Phos  2.8     06-08  Mg     1.7     06-08            RADIOLOGY & ADDITIONAL TESTS:    A/P: 65 yo F POD#2 from supracervical hysterectomy, JONE, omentectomy. Doing well this morning with no complaints    Cancer type: ovarian serous carcinoma   Neuro: Pain well controlled without pain medication. Continue current pain regimen.  CV: No history of cardiovascular disease. Blood pressure well controlled.  Pulm: No active disease. Saturating well on room air. Incentive spirometer use demonstrated and encouraged  GI: No active disease. Bowel sounds and function normal, tolerating PO diet. Continue current bowel regimen.   : Voiding spontaneously. Ambulating to bathroom independently  Heme: Hgb 10.2 > 10.2  ID: Afebrile. No antibiotics indicated at this time.   Endo: No active disease.   FEN: Regular diet, good oral intake. Electrolytes WNL.   Skin: No active disease.   Psych: No active disease.   DVT ppx: Ambulation encouraged, SCDs when in bed, lovenox ordered.  Dispo: Pending AM rounds.    GYNECOLOGIC ONCOLOGY PROGRESS NOTE    POD#2 from supracervical hyst, JONE, omentectomy, aborted HIPEC for primary peritoneal carcinoma Stage IIIC serous carcinoma    PROBLEMS:  Ovarian ca    Hypertension    Chronic GERD    Hyperlipidemia    Depression, reactive    Need for prophylactic measure    Bilateral hearing loss        Pt seen and examined at bedside.     SUBJECTIVE:    Patient is without complaints.  Pain well-controlled. OOB and ambulating independently  Flatus: +, + loose stool  Denies Nausea, Vomiting or Diarrhea.  Denies shortness of breath, chest pain or dyspnea on exertion.  Tolerating diet.    OBJECTIVE:     VITALS:  T(F): 97.9 (06-08-22 @ 05:37), Max: 98.6 (06-07-22 @ 08:41)  HR: 68 (06-08-22 @ 05:37) (61 - 70)  BP: 132/70 (06-08-22 @ 05:37) (128/82 - 137/72)  RR: 16 (06-08-22 @ 05:37) (16 - 18)  SpO2: 97% (06-08-22 @ 05:37) (97% - 99%)  Wt(kg): --    I&O's Summary    07 Jun 2022 07:01  -  08 Jun 2022 07:00  --------------------------------------------------------  IN: 0 mL / OUT: 1300 mL / NET: -1300 mL        MEDICATIONS  (STANDING):  acetaminophen     Tablet .. 975 milliGRAM(s) Oral every 6 hours  atorvastatin 20 milliGRAM(s) Oral at bedtime  enoxaparin Injectable 40 milliGRAM(s) SubCutaneous every 24 hours  losartan 50 milliGRAM(s) Oral daily  pantoprazole    Tablet 40 milliGRAM(s) Oral before breakfast  sertraline 50 milliGRAM(s) Oral daily    MEDICATIONS  (PRN):  ondansetron Injectable 4 milliGRAM(s) IV Push every 6 hours PRN Nausea and/or Vomiting  oxyCODONE    IR 5 milliGRAM(s) Oral every 4 hours PRN Moderate Pain (4 - 6)  oxyCODONE    IR 10 milliGRAM(s) Oral every 4 hours PRN Severe Pain (7 - 10)      Physical Exam:  Constitutional: NAD  Pulmonary: clear to auscultation bilaterally   Cardiovascular: Regular rate and rhythm   Abdomen: soft, mild tenderness in RUQ, non-distended, normal bowel sounds  Extremities: no lower extremity edema or calve tenderness, Haven's sign negative.  Incision: Prevena in place with good suction. ~3cm ecchymosis under Prevena at R upper border of foam     LABS:                        10.2   5.76  )-----------( 127      ( 08 Jun 2022 05:59 )             30.8     06-08    142  |  108<H>  |  10.4  ----------------------------<  99  3.8   |  26.0  |  0.53    Ca    8.3<L>      08 Jun 2022 05:59  Phos  2.8     06-08  Mg     1.7     06-08            RADIOLOGY & ADDITIONAL TESTS:    A/P: 65 yo F POD#2 from supracervical hysterectomy, JONE, omentectomy. Doing well this morning with no complaints, feels prepared for d/c home today    Cancer type: ovarian serous carcinoma   Neuro: Pain well controlled without pain medication. Continue current pain regimen.  CV: No history of cardiovascular disease. Blood pressure well controlled.  Pulm: No active disease. Saturating well on room air. Incentive spirometer use demonstrated and encouraged  GI: No active disease. Bowel sounds and function normal, tolerating PO diet. Continue current bowel regimen.   : Voiding spontaneously. Ambulating to bathroom independently  Heme: Hgb 10.2 > 10.2  ID: Afebrile. No antibiotics indicated at this time.   Endo: No active disease.   FEN: Regular diet, good oral intake. Electrolytes WNL.   Skin: No active disease.   Psych: No active disease.   DVT ppx: Ambulation encouraged, SCDs when in bed, lovenox ordered.  Dispo: Plan to d/c home later today   GYNECOLOGIC ONCOLOGY PROGRESS NOTE    POD#2 from supracervical hyst, JONE, omentectomy, aborted HIPEC for primary peritoneal carcinoma Stage IIIC serous carcinoma    PROBLEMS:  Ovarian ca  Hypertension  Chronic GERD  Hyperlipidemia  Depression, reactive  Need for prophylactic measure  Bilateral hearing loss    Pt seen and examined at bedside.     SUBJECTIVE:  Patient is without complaints.  Pain well-controlled. OOB and ambulating independently  Flatus: +, + BM  Denies Nausea, Vomiting or Diarrhea.  Denies shortness of breath, chest pain or dyspnea on exertion.  Tolerating diet.    OBJECTIVE:     VITALS:  T(F): 97.9 (06-08-22 @ 05:37), Max: 98.6 (06-07-22 @ 08:41)  HR: 68 (06-08-22 @ 05:37) (61 - 70)  BP: 132/70 (06-08-22 @ 05:37) (128/82 - 137/72)  RR: 16 (06-08-22 @ 05:37) (16 - 18)  SpO2: 97% (06-08-22 @ 05:37) (97% - 99%)    I&O's Summary    07 Jun 2022 07:01  -  08 Jun 2022 07:00  --------------------------------------------------------  IN: 0 mL / OUT: 1300 mL / NET: -1300 mL        MEDICATIONS  (STANDING):  acetaminophen     Tablet .. 975 milliGRAM(s) Oral every 6 hours  atorvastatin 20 milliGRAM(s) Oral at bedtime  enoxaparin Injectable 40 milliGRAM(s) SubCutaneous every 24 hours  losartan 50 milliGRAM(s) Oral daily  pantoprazole    Tablet 40 milliGRAM(s) Oral before breakfast  sertraline 50 milliGRAM(s) Oral daily    MEDICATIONS  (PRN):  ondansetron Injectable 4 milliGRAM(s) IV Push every 6 hours PRN Nausea and/or Vomiting  oxyCODONE    IR 5 milliGRAM(s) Oral every 4 hours PRN Moderate Pain (4 - 6)  oxyCODONE    IR 10 milliGRAM(s) Oral every 4 hours PRN Severe Pain (7 - 10)      Physical Exam:  Constitutional: NAD  Pulmonary: clear to auscultation bilaterally   Cardiovascular: Regular rate and rhythm   Abdomen: soft, mild tenderness in RUQ, non-distended, normal bowel sounds  Extremities: no lower extremity edema or calve tenderness, Haven's sign negative.  Incision: Prevena in place with good suction. ~3cm ecchymosis under Prevena at R upper border of foam     LABS:                        10.2   5.76  )-----------( 127      ( 08 Jun 2022 05:59 )             30.8     06-08    142  |  108<H>  |  10.4  ----------------------------<  99  3.8   |  26.0  |  0.53    Ca    8.3<L>      08 Jun 2022 05:59  Phos  2.8     06-08  Mg     1.7     06-08            RADIOLOGY & ADDITIONAL TESTS:

## 2022-06-08 NOTE — PROGRESS NOTE ADULT - ASSESSMENT
A/P: 65 yo F POD#2 from supracervical hysterectomy, JONE, omentectomy. Doing well this morning with no complaints, feels prepared for d/c home today    Cancer type: ovarian serous carcinoma   Neuro: Pain well controlled without pain medication. Continue current pain regimen.  CV: No history of cardiovascular disease. Blood pressure well controlled.  Pulm: No active disease. Saturating well on room air. Incentive spirometer use demonstrated and encouraged  GI: No active disease. Bowel sounds and function normal, tolerating PO diet. Continue current bowel regimen.   : Voiding spontaneously. Ambulating to bathroom independently  Heme: Hgb 10.2 > 10.2  ID: Afebrile. No antibiotics indicated at this time.   Endo: No active disease.   FEN: Regular diet, good oral intake. Electrolytes WNL.   Skin: No active disease.   Psych: No active disease.   DVT ppx: Ambulation encouraged, SCDs when in bed, lovenox ordered.  Dispo: anticipate home today    discussed with attending Dr Flores

## 2022-06-08 NOTE — DISCHARGE NOTE NURSING/CASE MANAGEMENT/SOCIAL WORK - PATIENT PORTAL LINK FT
You can access the FollowMyHealth Patient Portal offered by [facility] by registering at the following website: [email or web address]. By joining [employer]’s FollowMyHealth portal, you will also be able to view your health information using other applications (apps) compatible with our system.

## 2022-06-08 NOTE — DISCHARGE NOTE PROVIDER - NSDCMRMEDTOKEN_GEN_ALL_CORE_FT
atorvastatin 20 mg oral tablet: 1 tab(s) orally once a day  clobetasol topical: Apply topically to affected area 2 times a week  losartan 50 mg oral tablet: 1 tab(s) orally once a day  oxyCODONE 5 mg oral tablet: 1 tab(s) orally every 6 hours MDD:4  pantoprazole 40 mg oral delayed release tablet: 1 tab(s) orally once a day  sertraline 50 mg oral tablet: 1 tab(s) orally once a day  Tylenol 325 mg oral capsule: 3 cap(s) orally every 6 hours as needed for postoperative pain

## 2022-06-09 ENCOUNTER — NON-APPOINTMENT (OUTPATIENT)
Age: 67
End: 2022-06-09

## 2022-06-17 LAB — SURGICAL PATHOLOGY STUDY: SIGNIFICANT CHANGE UP

## 2022-06-20 ENCOUNTER — NON-APPOINTMENT (OUTPATIENT)
Age: 67
End: 2022-06-20

## 2022-06-22 ENCOUNTER — OUTPATIENT (OUTPATIENT)
Dept: OUTPATIENT SERVICES | Facility: HOSPITAL | Age: 67
LOS: 1 days | Discharge: HOME | End: 2022-06-22

## 2022-06-22 ENCOUNTER — APPOINTMENT (OUTPATIENT)
Dept: GYNECOLOGIC ONCOLOGY | Facility: CLINIC | Age: 67
End: 2022-06-22
Payer: MEDICARE

## 2022-06-22 VITALS
HEIGHT: 62 IN | HEART RATE: 78 BPM | RESPIRATION RATE: 16 BRPM | BODY MASS INDEX: 27.6 KG/M2 | SYSTOLIC BLOOD PRESSURE: 149 MMHG | WEIGHT: 150 LBS | DIASTOLIC BLOOD PRESSURE: 77 MMHG | OXYGEN SATURATION: 99 %

## 2022-06-22 DIAGNOSIS — Z90.722 ACQUIRED ABSENCE OF OVARIES, BILATERAL: Chronic | ICD-10-CM

## 2022-06-22 DIAGNOSIS — Z96.21 COCHLEAR IMPLANT STATUS: Chronic | ICD-10-CM

## 2022-06-22 DIAGNOSIS — Z98.51 TUBAL LIGATION STATUS: Chronic | ICD-10-CM

## 2022-06-22 DIAGNOSIS — C48.2 MALIGNANT NEOPLASM OF PERITONEUM, UNSPECIFIED: ICD-10-CM

## 2022-06-22 DIAGNOSIS — Z98.890 OTHER SPECIFIED POSTPROCEDURAL STATES: Chronic | ICD-10-CM

## 2022-06-22 PROCEDURE — 99024 POSTOP FOLLOW-UP VISIT: CPT

## 2022-06-22 NOTE — ASSESSMENT
[FreeTextEntry1] : Discussed with patient and her  that her final pathology did not reveal any residual disease. I am recommending she follow up with Dr. Garces to discuss possible parp inhibitors.\par \par I advised patient that I am moving out of [facility] to [place] and explained what her follow up care should be moving forward for patient. I am recommending she follow up with Dr. Murillo in September. Patient stated she understood and agreed to comply.

## 2022-06-22 NOTE — DISCUSSION/SUMMARY
[Clean] : was clean [Dry] : was dry [None] : had no drainage [Normal Skin] : normal appearance [Doing Well] : is doing well [Excellent Pain Control] : has excellent pain control [No Sign of Infection] : is showing no signs of infection [Findings] : These findings were discussed with [unfilled] in detail. She understood and accepted the rationale for this recommendation and also understood the serious impact that these findings could have upon her prognosis for survival. [Erythema] : was not erythematous [Ecchymosis] : was not ecchymotic [Seroma] : had no seroma [Firm] : soft [Tender] : nontender [Abnormal Bowel Sounds] : normal bowel sounds [Rebound] : no rebound tenderness [Guarding] : no guarding [Incisional Hernia] : no incisional hernia [Mass] : no palpable mass [External Genitalia Abnormal] : normal external genitalia [Vaginal Exam Abnormal] : normal vaginal exam [de-identified] : Xuan Shearer Medical assistant chaperoned during gynecologic exam.  [FreeTextEntry1] : Pertinent findings revealed small anteverted uterus with surgically absent tubes and ovaries. Normal upper abdomen including omentum and diaphragm, no gross evidence of persistent disease. HIPEC machine was not functioning so no HIPEC done. \par \par final pathology reviewed

## 2022-06-22 NOTE — REASON FOR VISIT
[Post Op] : post op visit [Spouse] : spouse [de-identified] : 6/6/22 [de-identified] : Ex-Lap Supracervical hysterectomy, omentectomy at [facility] for Primary peritoneal cancer s/p [place]-adjuvant chemotherapy.  [de-identified] : Patient has recovered well from her surgery, Denies any SOB, abnormal pain or VB. Bowel and bladder function are wnl. Patient states she feels well from a gynecological stand point.

## 2022-06-23 ENCOUNTER — RESULT REVIEW (OUTPATIENT)
Age: 67
End: 2022-06-23

## 2022-06-23 ENCOUNTER — APPOINTMENT (OUTPATIENT)
Dept: HEMATOLOGY ONCOLOGY | Facility: CLINIC | Age: 67
End: 2022-06-23
Payer: MEDICARE

## 2022-06-23 VITALS
BODY MASS INDEX: 27.43 KG/M2 | HEIGHT: 62 IN | OXYGEN SATURATION: 97 % | SYSTOLIC BLOOD PRESSURE: 132 MMHG | HEART RATE: 69 BPM | DIASTOLIC BLOOD PRESSURE: 77 MMHG | WEIGHT: 149.06 LBS

## 2022-06-23 LAB
BASOPHILS # BLD AUTO: 0.1 K/UL — SIGNIFICANT CHANGE UP (ref 0–0.2)
BASOPHILS NFR BLD AUTO: 1.3 % — SIGNIFICANT CHANGE UP (ref 0–2)
EOSINOPHIL # BLD AUTO: 0.3 K/UL — SIGNIFICANT CHANGE UP (ref 0–0.5)
EOSINOPHIL NFR BLD AUTO: 4.6 % — SIGNIFICANT CHANGE UP (ref 0–6)
HCT VFR BLD CALC: 40.9 % — SIGNIFICANT CHANGE UP (ref 34.5–45)
HGB BLD-MCNC: 13 G/DL — SIGNIFICANT CHANGE UP (ref 11.5–15.5)
LYMPHOCYTES # BLD AUTO: 1.2 K/UL — SIGNIFICANT CHANGE UP (ref 1–3.3)
LYMPHOCYTES # BLD AUTO: 19.5 % — SIGNIFICANT CHANGE UP (ref 13–44)
MCHC RBC-ENTMCNC: 30 PG — SIGNIFICANT CHANGE UP (ref 27–34)
MCHC RBC-ENTMCNC: 31.7 G/DL — LOW (ref 32–36)
MCV RBC AUTO: 94.6 FL — SIGNIFICANT CHANGE UP (ref 80–100)
MONOCYTES # BLD AUTO: 0.5 K/UL — SIGNIFICANT CHANGE UP (ref 0–0.9)
MONOCYTES NFR BLD AUTO: 8.8 % — SIGNIFICANT CHANGE UP (ref 2–14)
NEUTROPHILS # BLD AUTO: 4 K/UL — SIGNIFICANT CHANGE UP (ref 1.8–7.4)
NEUTROPHILS NFR BLD AUTO: 65.8 % — SIGNIFICANT CHANGE UP (ref 43–77)
PLATELET # BLD AUTO: 224 K/UL — SIGNIFICANT CHANGE UP (ref 150–400)
RBC # BLD: 4.32 M/UL — SIGNIFICANT CHANGE UP (ref 3.8–5.2)
RBC # FLD: 14.6 % — HIGH (ref 10.3–14.5)
WBC # BLD: 6.1 K/UL — SIGNIFICANT CHANGE UP (ref 3.8–10.5)
WBC # FLD AUTO: 6.1 K/UL — SIGNIFICANT CHANGE UP (ref 3.8–10.5)

## 2022-06-23 PROCEDURE — 99215 OFFICE O/P EST HI 40 MIN: CPT

## 2022-06-23 NOTE — ASSESSMENT
[FreeTextEntry1] : # Primary Peritoneal Cancer, high grade serous, stage III, HRD positive \par - s/p 3 cycles of neoadjuvant chemotherapy with carbo/taxol/marce. Marce held at cycle 3 in anticipation of surgery. \par - s/p interval debulking surgery on 6/6/2022. Patient had optimal debulking surgery. \par -Continue carbo/taxol + bevacizumab for high grade serous primary peritoneal carcinomatosis. Will hold bevacizumab with cycle 4 to allow for complete healing after resection. \par - Plan to start olaparib with maintenance bevacizumab after completion of chemotherapy and debulking surgery. \par -RTC Q3 weeks for treatment and mid cycle MD/PA follow up\par \par

## 2022-06-23 NOTE — HISTORY OF PRESENT ILLNESS
[de-identified] : 65 yo F with h/o HTN, hyperlipidemia, GERD, decreased hearing (present since childhood) and depression who was diagnosed with serous carcinoma found in a right side omental nodule in February 2022.\par \par She saw her GYN, Dr. Melissa Jaimes, for her annual exam in November 2021.  A transvaginal U/S showed abnormal endometrial thickening and uterine fibroids. CT A/P performed on 1/26/22 revealed multiple lobular soft tissue masses involving the omentum most pronounced on the R. side adjacent to ascending colon and cecum. Additionally nodularity adjacent to tip of cecum involving peritoneum. 2.9 cm left hemipelvic cyst suspicious for ovarian neoplasia. 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 Patient was negative for BRCA.\par \par She was planned for exploratory laparotomy and excision of the mass with Dr. Barrie Avelar but had a second opinion with Dr. Flores who recommended further work up of the mass with an IR biopsy.  Biopsy done on 2/16/22 and results showed poorly differentiated carcinoma consistent with serous carcinoma. Immunohistochemical stains for CK-7, CK-20, PAX 8, P53, WT1 and P16 were performed on block 1A at [facility] and interpreted at [facility] as follows:  \par CK-7, PAX 8, P16, P53, WT1: Positive.  CK-20: Negative\par \par The patient was started on carbo/taxol/estuardo. 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 The patient underwent interval, optimal debulking surgery on 6/6/2022 after 3 cycles of neoadjuvant chemotherapy.  [de-identified] : The patient states that she is doing well and healing after surgery. She denies worsening abdominal pain, fevers, chills, nausea, vomiting, diarrhea.

## 2022-06-23 NOTE — RESULTS/DATA
[FreeTextEntry1] : 6/6/22 Debulking surgery\par Final Diagnosis\par \par 1. Omentum, partial Omentectomy ( 12 cm segment)\par \par - Omentum with adhesions and chronic inflammation\par - No atypia or malignancy identified\par \par 2. Uterus, corpus , hysterectomy\par - 35 Gram uterine corpus\par - Serosa: Adhesions and chronic inflammation\par - Cervix: Not seen, supra cervical specimen\par - Endometrium: Weakly proliferative with cystic atrophy. Endometrial polyp\par ( 1.0 cm)\par Negative for hyperplasia\par - Myometrium: Leiomyomas. Adenomyosis.\par - Negative for atypia or malignancy\par \par 3. Gastro colic ligament, excision\par - Fibrofatty tissue with scar, chronic inflammation, foreign body\par granuloma reaction\par - Negative for atypia or malignancy\par \par 5/5/22 Chest/ab/pelvis\par IMPRESSION:\par No gross evidence for carcinomatosis. 2.3 x 1.2 cm fluid density in the expected location of left adnexa this patient status post bilateral salpingo-oophorectomy. This may represent a small seroma or lymphocele. Minimal hazy soft tissue stranding in the supra colic omentum at site of previously visualized omental implant.\par \par 3/5/22 Chest CT \par IMPRESSION:\par Stable 3 mm right middle lobe nodule.\par \par Slightly larger right cardiophrenic lymphadenopathy. Stable left supradiaphragmatic lymph nodes.\par \par Partially visualized peritoneal nodularity in the left upper quadrant.\par \par \par Pathology 2/25/22 \par Accession:                             [id number]\par \par Collected Date/Time:                   2/25/2022 12:15 EST\par Received Date/Time:                    2/25/2022 12:15 EST\par \par Surgical Pathology Consultation Report - Auth (Verified)\par \par Specimen(s) Submitted\par \par 1. Slide Consult- Right omental nodule biopsy (11 slides, 29-K-,\par 1A1, PAX8, PAX8, CK7, CK7, WT1, P16, P53, WT1, P53)\par \par Final Diagnosis\par Submitting Institution:  [facility]\par \par \par Date of Procedure:  2/16/22\par \par Right omental nodule biopsy\par - Compatible with high grade serous carcinoma of adnexal or peritoneal\par origin, see note\par \par Note: In submitted immunostains, tumor cells are positive for CK7, PAX8,\par WT-1 and p16(diffuse). Tumor cells show aberrant expression(over -\par expression) for p53.\par \par 1/26/22 CT abdomen/pelvis \par multi lobular soft tissue masses involving the omentum most pronounced right side adjacent to the ascending colon and cecum. Additional nodularity adjacent to the tip of the cecum involving the peritoneum. 2.9 cm left hemipelvic cyst. Findings suspicious for ovarian neoplasia. \par

## 2022-06-23 NOTE — PHYSICAL EXAM
[Fully active, able to carry on all pre-disease performance without restriction] : Status 0 - Fully active, able to carry on all pre-disease performance without restriction [Thin] : thin [Normal] : affect appropriate [de-identified] : Patient has bilateral hearing loss

## 2022-06-24 LAB
ALBUMIN SERPL ELPH-MCNC: 4.5 G/DL
ALP BLD-CCNC: 73 U/L
ALT SERPL-CCNC: 19 U/L
ANION GAP SERPL CALC-SCNC: 11 MMOL/L
AST SERPL-CCNC: 19 U/L
BILIRUB SERPL-MCNC: 0.5 MG/DL
BUN SERPL-MCNC: 22 MG/DL
CALCIUM SERPL-MCNC: 9.4 MG/DL
CANCER AG125 SERPL-ACNC: 23 U/ML
CHLORIDE SERPL-SCNC: 104 MMOL/L
CO2 SERPL-SCNC: 25 MMOL/L
CREAT SERPL-MCNC: 0.63 MG/DL
EGFR: 98 ML/MIN/1.73M2
GLUCOSE SERPL-MCNC: 109 MG/DL
MAGNESIUM SERPL-MCNC: 1.9 MG/DL
POTASSIUM SERPL-SCNC: 4.7 MMOL/L
PROT SERPL-MCNC: 6.7 G/DL
SODIUM SERPL-SCNC: 140 MMOL/L

## 2022-07-05 ENCOUNTER — NON-APPOINTMENT (OUTPATIENT)
Age: 67
End: 2022-07-05

## 2022-07-08 ENCOUNTER — APPOINTMENT (OUTPATIENT)
Dept: HEMATOLOGY ONCOLOGY | Facility: CLINIC | Age: 67
End: 2022-07-08

## 2022-07-08 ENCOUNTER — RESULT REVIEW (OUTPATIENT)
Age: 67
End: 2022-07-08

## 2022-07-08 LAB
BASOPHILS # BLD AUTO: 0.1 K/UL — SIGNIFICANT CHANGE UP (ref 0–0.2)
BASOPHILS NFR BLD AUTO: 1.6 % — SIGNIFICANT CHANGE UP (ref 0–2)
EOSINOPHIL # BLD AUTO: 0.2 K/UL — SIGNIFICANT CHANGE UP (ref 0–0.5)
EOSINOPHIL NFR BLD AUTO: 6 % — SIGNIFICANT CHANGE UP (ref 0–6)
HCT VFR BLD CALC: 40.1 % — SIGNIFICANT CHANGE UP (ref 34.5–45)
HGB BLD-MCNC: 13.3 G/DL — SIGNIFICANT CHANGE UP (ref 11.5–15.5)
LYMPHOCYTES # BLD AUTO: 1.3 K/UL — SIGNIFICANT CHANGE UP (ref 1–3.3)
LYMPHOCYTES # BLD AUTO: 32.2 % — SIGNIFICANT CHANGE UP (ref 13–44)
MCHC RBC-ENTMCNC: 30.9 PG — SIGNIFICANT CHANGE UP (ref 27–34)
MCHC RBC-ENTMCNC: 33.1 G/DL — SIGNIFICANT CHANGE UP (ref 32–36)
MCV RBC AUTO: 93.4 FL — SIGNIFICANT CHANGE UP (ref 80–100)
MONOCYTES # BLD AUTO: 0.5 K/UL — SIGNIFICANT CHANGE UP (ref 0–0.9)
MONOCYTES NFR BLD AUTO: 11.6 % — SIGNIFICANT CHANGE UP (ref 2–14)
NEUTROPHILS # BLD AUTO: 1.9 K/UL — SIGNIFICANT CHANGE UP (ref 1.8–7.4)
NEUTROPHILS NFR BLD AUTO: 48.5 % — SIGNIFICANT CHANGE UP (ref 43–77)
PLATELET # BLD AUTO: 159 K/UL — SIGNIFICANT CHANGE UP (ref 150–400)
RBC # BLD: 4.3 M/UL — SIGNIFICANT CHANGE UP (ref 3.8–5.2)
RBC # FLD: 12.6 % — SIGNIFICANT CHANGE UP (ref 10.3–14.5)
WBC # BLD: 4 K/UL — SIGNIFICANT CHANGE UP (ref 3.8–10.5)
WBC # FLD AUTO: 4 K/UL — SIGNIFICANT CHANGE UP (ref 3.8–10.5)

## 2022-07-11 ENCOUNTER — APPOINTMENT (OUTPATIENT)
Age: 67
End: 2022-07-11

## 2022-07-12 DIAGNOSIS — R11.2 NAUSEA WITH VOMITING, UNSPECIFIED: ICD-10-CM

## 2022-07-12 DIAGNOSIS — Z51.11 ENCOUNTER FOR ANTINEOPLASTIC CHEMOTHERAPY: ICD-10-CM

## 2022-07-12 DIAGNOSIS — Z51.89 ENCOUNTER FOR OTHER SPECIFIED AFTERCARE: ICD-10-CM

## 2022-07-13 ENCOUNTER — APPOINTMENT (OUTPATIENT)
Dept: HEMATOLOGY ONCOLOGY | Facility: CLINIC | Age: 67
End: 2022-07-13

## 2022-07-13 ENCOUNTER — RESULT REVIEW (OUTPATIENT)
Age: 67
End: 2022-07-13

## 2022-07-13 VITALS
SYSTOLIC BLOOD PRESSURE: 100 MMHG | OXYGEN SATURATION: 96 % | BODY MASS INDEX: 27.79 KG/M2 | HEIGHT: 62 IN | DIASTOLIC BLOOD PRESSURE: 74 MMHG | HEART RATE: 74 BPM | WEIGHT: 151 LBS

## 2022-07-13 LAB
ALBUMIN SERPL ELPH-MCNC: 4.3 G/DL
ALP BLD-CCNC: 73 U/L
ALT SERPL-CCNC: 18 U/L
ANION GAP SERPL CALC-SCNC: 10 MMOL/L
AST SERPL-CCNC: 20 U/L
BASOPHILS # BLD AUTO: 0 K/UL — SIGNIFICANT CHANGE UP (ref 0–0.2)
BASOPHILS NFR BLD AUTO: 0.3 % — SIGNIFICANT CHANGE UP (ref 0–2)
BILIRUB SERPL-MCNC: 0.9 MG/DL
BUN SERPL-MCNC: 21 MG/DL
CALCIUM SERPL-MCNC: 9.2 MG/DL
CHLORIDE SERPL-SCNC: 103 MMOL/L
CO2 SERPL-SCNC: 28 MMOL/L
CREAT SERPL-MCNC: 0.68 MG/DL
EGFR: 97 ML/MIN/1.73M2
EOSINOPHIL # BLD AUTO: 0 K/UL — SIGNIFICANT CHANGE UP (ref 0–0.5)
EOSINOPHIL NFR BLD AUTO: 0.3 % — SIGNIFICANT CHANGE UP (ref 0–6)
GLUCOSE SERPL-MCNC: 99 MG/DL
HCT VFR BLD CALC: 41.7 % — SIGNIFICANT CHANGE UP (ref 34.5–45)
HGB BLD-MCNC: 13.3 G/DL — SIGNIFICANT CHANGE UP (ref 11.5–15.5)
LYMPHOCYTES # BLD AUTO: 1.3 K/UL — SIGNIFICANT CHANGE UP (ref 1–3.3)
LYMPHOCYTES # BLD AUTO: 8 % — LOW (ref 13–44)
MAGNESIUM SERPL-MCNC: 1.8 MG/DL
MCHC RBC-ENTMCNC: 31.2 PG — SIGNIFICANT CHANGE UP (ref 27–34)
MCHC RBC-ENTMCNC: 31.9 G/DL — LOW (ref 32–36)
MCV RBC AUTO: 97.5 FL — SIGNIFICANT CHANGE UP (ref 80–100)
MONOCYTES # BLD AUTO: 0.3 K/UL — SIGNIFICANT CHANGE UP (ref 0–0.9)
MONOCYTES NFR BLD AUTO: 1.9 % — LOW (ref 2–14)
NEUTROPHILS # BLD AUTO: 15.2 K/UL — HIGH (ref 1.8–7.4)
NEUTROPHILS NFR BLD AUTO: 89.6 % — HIGH (ref 43–77)
PLATELET # BLD AUTO: 158 K/UL — SIGNIFICANT CHANGE UP (ref 150–400)
POTASSIUM SERPL-SCNC: 4.1 MMOL/L
PROT SERPL-MCNC: 6.6 G/DL
RBC # BLD: 4.28 M/UL — SIGNIFICANT CHANGE UP (ref 3.8–5.2)
RBC # FLD: 12.8 % — SIGNIFICANT CHANGE UP (ref 10.3–14.5)
SODIUM SERPL-SCNC: 141 MMOL/L
WBC # BLD: 16.9 K/UL — HIGH (ref 3.8–10.5)
WBC # FLD AUTO: 16.9 K/UL — HIGH (ref 3.8–10.5)

## 2022-07-13 PROCEDURE — 99214 OFFICE O/P EST MOD 30 MIN: CPT

## 2022-07-14 NOTE — ASSESSMENT
[FreeTextEntry1] : # Primary Peritoneal Cancer, high grade serous, stage III, HRD positive \par - s/p 3 cycles of neoadjuvant chemotherapy with carbo/taxol/marce. Marce held at cycle 3 in anticipation of surgery. \par - s/p ex-lap supracervical hysterectomy and omentectomy with Dr. Jone Flores on 6/6/22, final pathology did not reveal any residual disease.\par -Continue carbo/taxol + bevacizumab for high grade serous primary peritoneal carcinomatosis. Held bevacizumab with cycle 4 to allow for complete healing after resection. Will start Marce on C5\par -Plan to start olaparib with maintenance bevacizumab after completion of chemotherapy and debulking surgery. \par -RTC Q3 weeks for treatment and MD/PA follow up on ~D15 each cycle\par -Will follow up with Dr. Murillo now that Dr. Flores [place] out of state\par \par # Supportive\par -tolerating well so far but was seen today early in this cycle.  Advised to call with any new or worsening symptoms\par -continues to work full time \par -denies any post-op related issues and incision appears well healed \par \par \par

## 2022-07-14 NOTE — HISTORY OF PRESENT ILLNESS
[de-identified] : 65 yo F with h/o HTN, hyperlipidemia, GERD, decreased hearing (present since childhood) and depression who was diagnosed with serous carcinoma found in a right side omental nodule in February 2022.\par \par She saw her GYN, Dr. Melissa Jaimes, for her annual exam in November 2021.  A transvaginal U/S showed abnormal endometrial thickening and uterine fibroids. CT A/P performed on 1/26/22 revealed multiple lobular soft tissue masses involving the omentum most pronounced on the R. side adjacent to ascending colon and cecum. Additionally nodularity adjacent to tip of cecum involving peritoneum. 2.9 cm left hemipelvic cyst suspicious for ovarian neoplasia. 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 Patient was negative for BRCA.\par \par She was planned for exploratory laparotomy and excision of the mass with Dr. Barrie Avelar but had a second opinion with Dr. Flores who recommended further work up of the mass with an IR biopsy.  Biopsy done on 2/16/22 and results showed poorly differentiated carcinoma consistent with serous carcinoma. Immunohistochemical stains for CK-7, CK-20, PAX 8, P53, WT1 and P16 were performed on block 1A at [facility] and interpreted at [facility] as follows:  \par CK-7, PAX 8, P16, P53, WT1: Positive.  CK-20: Negative\par \par The patient was started on carbo/taxol/estuardo. 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 The patient underwent interval, optimal debulking surgery on 6/6/2022 after 3 cycles of neoadjuvant chemotherapy.  [de-identified] : Patient presents on C4D3 carbo/taxol for high grade serous primary peritoneal carcinomatosis s/p ex-lap supracervical hysterectomy and omentectomy with Dr. Jone Flores on 6/6/22\par + Fatigue. + Decreased appetite. + Dysgeusia. + Constipation. No N/V/D. No arthralgia/myalgia. No edema. No flushing. No fevers, cough, SOB.  No incisional pain or post-op related pain.\par \par

## 2022-07-14 NOTE — PHYSICAL EXAM
[Fully active, able to carry on all pre-disease performance without restriction] : Status 0 - Fully active, able to carry on all pre-disease performance without restriction [Thin] : thin [Normal] : affect appropriate [de-identified] : Patient has bilateral hearing loss

## 2022-07-14 NOTE — RESULTS/DATA
[FreeTextEntry1] : 6/6/22 Debulking surgery\par Final Diagnosis\par \par 1. Omentum, partial Omentectomy ( 12 cm segment)\par \par - Omentum with adhesions and chronic inflammation\par - No atypia or malignancy identified\par \par 2. Uterus, corpus , hysterectomy\par - 35 Gram uterine corpus\par - Serosa: Adhesions and chronic inflammation\par - Cervix: Not seen, supra cervical specimen\par - Endometrium: Weakly proliferative with cystic atrophy. Endometrial polyp\par ( 1.0 cm)\par Negative for hyperplasia\par - Myometrium: Leiomyomas. Adenomyosis.\par - Negative for atypia or malignancy\par \par 3. Gastro colic ligament, excision\par - Fibrofatty tissue with scar, chronic inflammation, foreign body\par granuloma reaction\par - Negative for atypia or malignancy\par \par 5/5/22 Chest/ab/pelvis\par IMPRESSION:\par No gross evidence for carcinomatosis. 2.3 x 1.2 cm fluid density in the expected location of left adnexa this patient status post bilateral salpingo-oophorectomy. This may represent a small seroma or lymphocele. Minimal hazy soft tissue stranding in the supra colic omentum at site of previously visualized omental implant.\par \par 3/5/22 Chest CT \par IMPRESSION:\par Stable 3 mm right middle lobe nodule.\par \par Slightly larger right cardiophrenic lymphadenopathy. Stable left supradiaphragmatic lymph nodes.\par \par Partially visualized peritoneal nodularity in the left upper quadrant.\par \par \par Pathology 2/25/22 \par Accession:                             [id number]\par \par Collected Date/Time:                   2/25/2022 12:15 EST\par Received Date/Time:                    2/25/2022 12:15 EST\par \par Surgical Pathology Consultation Report - Auth (Verified)\par \par Specimen(s) Submitted\par \par 1. Slide Consult- Right omental nodule biopsy (11 slides, 30-Q-,\par 1A1, PAX8, PAX8, CK7, CK7, WT1, P16, P53, WT1, P53)\par \par Final Diagnosis\par Submitting Institution:  [facility]\par \par \par Date of Procedure:  2/16/22\par \par Right omental nodule biopsy\par - Compatible with high grade serous carcinoma of adnexal or peritoneal\par origin, see note\par \par Note: In submitted immunostains, tumor cells are positive for CK7, PAX8,\par WT-1 and p16(diffuse). Tumor cells show aberrant expression(over -\par expression) for p53.\par \par 1/26/22 CT abdomen/pelvis \par multi lobular soft tissue masses involving the omentum most pronounced right side adjacent to the ascending colon and cecum. Additional nodularity adjacent to the tip of the cecum involving the peritoneum. 2.9 cm left hemipelvic cyst. Findings suspicious for ovarian neoplasia. \par

## 2022-07-15 ENCOUNTER — TRANSCRIPTION ENCOUNTER (OUTPATIENT)
Age: 67
End: 2022-07-15

## 2022-08-01 ENCOUNTER — RESULT REVIEW (OUTPATIENT)
Age: 67
End: 2022-08-01

## 2022-08-01 ENCOUNTER — APPOINTMENT (OUTPATIENT)
Dept: HEMATOLOGY ONCOLOGY | Facility: CLINIC | Age: 67
End: 2022-08-01

## 2022-08-01 LAB
ANISOCYTOSIS BLD QL: SLIGHT — SIGNIFICANT CHANGE UP
BASOPHILS # BLD AUTO: 0.1 K/UL — SIGNIFICANT CHANGE UP (ref 0–0.2)
DACRYOCYTES BLD QL SMEAR: SLIGHT — SIGNIFICANT CHANGE UP
ELLIPTOCYTES BLD QL SMEAR: SLIGHT — SIGNIFICANT CHANGE UP
EOSINOPHIL # BLD AUTO: 0 K/UL — SIGNIFICANT CHANGE UP (ref 0–0.5)
EOSINOPHIL NFR BLD AUTO: 1 % — SIGNIFICANT CHANGE UP (ref 0–6)
HCT VFR BLD CALC: 38.6 % — SIGNIFICANT CHANGE UP (ref 34.5–45)
HGB BLD-MCNC: 12.4 G/DL — SIGNIFICANT CHANGE UP (ref 11.5–15.5)
LYMPHOCYTES # BLD AUTO: 0.9 K/UL — LOW (ref 1–3.3)
LYMPHOCYTES # BLD AUTO: 22 % — SIGNIFICANT CHANGE UP (ref 13–44)
MACROCYTES BLD QL: SLIGHT — SIGNIFICANT CHANGE UP
MCHC RBC-ENTMCNC: 30.8 PG — SIGNIFICANT CHANGE UP (ref 27–34)
MCHC RBC-ENTMCNC: 32.2 G/DL — SIGNIFICANT CHANGE UP (ref 32–36)
MCV RBC AUTO: 96 FL — SIGNIFICANT CHANGE UP (ref 80–100)
MICROCYTES BLD QL: SLIGHT — SIGNIFICANT CHANGE UP
MONOCYTES # BLD AUTO: 0.7 K/UL — SIGNIFICANT CHANGE UP (ref 0–0.9)
MONOCYTES NFR BLD AUTO: 15 % — HIGH (ref 2–14)
NEUTROPHILS # BLD AUTO: 2.1 K/UL — SIGNIFICANT CHANGE UP (ref 1.8–7.4)
NEUTROPHILS NFR BLD AUTO: 60 % — SIGNIFICANT CHANGE UP (ref 43–77)
OVALOCYTES BLD QL SMEAR: SLIGHT — SIGNIFICANT CHANGE UP
PLAT MORPH BLD: NORMAL — SIGNIFICANT CHANGE UP
PLATELET # BLD AUTO: 221 K/UL — SIGNIFICANT CHANGE UP (ref 150–400)
POIKILOCYTOSIS BLD QL AUTO: SLIGHT — SIGNIFICANT CHANGE UP
POLYCHROMASIA BLD QL SMEAR: SLIGHT — SIGNIFICANT CHANGE UP
RBC # BLD: 4.02 M/UL — SIGNIFICANT CHANGE UP (ref 3.8–5.2)
RBC # FLD: 13.6 % — SIGNIFICANT CHANGE UP (ref 10.3–14.5)
RBC BLD AUTO: SIGNIFICANT CHANGE UP
VARIANT LYMPHS # BLD: 2 % — SIGNIFICANT CHANGE UP (ref 0–6)
WBC # BLD: 3.9 K/UL — SIGNIFICANT CHANGE UP (ref 3.8–10.5)
WBC # FLD AUTO: 3.9 K/UL — SIGNIFICANT CHANGE UP (ref 3.8–10.5)

## 2022-08-02 ENCOUNTER — APPOINTMENT (OUTPATIENT)
Dept: HEMATOLOGY ONCOLOGY | Facility: CLINIC | Age: 67
End: 2022-08-02

## 2022-08-02 LAB
ALBUMIN SERPL ELPH-MCNC: 4.4 G/DL
ALP BLD-CCNC: 71 U/L
ALT SERPL-CCNC: 16 U/L
ANION GAP SERPL CALC-SCNC: 12 MMOL/L
AST SERPL-CCNC: 18 U/L
BILIRUB SERPL-MCNC: 0.3 MG/DL
BUN SERPL-MCNC: 24 MG/DL
CALCIUM SERPL-MCNC: 9.4 MG/DL
CHLORIDE SERPL-SCNC: 103 MMOL/L
CO2 SERPL-SCNC: 26 MMOL/L
CREAT SERPL-MCNC: 0.69 MG/DL
EGFR: 96 ML/MIN/1.73M2
GLUCOSE SERPL-MCNC: 98 MG/DL
MAGNESIUM SERPL-MCNC: 1.8 MG/DL
POTASSIUM SERPL-SCNC: 5 MMOL/L
PROT SERPL-MCNC: 6.7 G/DL
SODIUM SERPL-SCNC: 141 MMOL/L

## 2022-08-04 ENCOUNTER — APPOINTMENT (OUTPATIENT)
Age: 67
End: 2022-08-04

## 2022-08-04 ENCOUNTER — RESULT REVIEW (OUTPATIENT)
Age: 67
End: 2022-08-04

## 2022-08-04 LAB
APPEARANCE UR: CLEAR — SIGNIFICANT CHANGE UP
BILIRUB UR-MCNC: NEGATIVE — SIGNIFICANT CHANGE UP
COLOR SPEC: SIGNIFICANT CHANGE UP
DIFF PNL FLD: NEGATIVE — SIGNIFICANT CHANGE UP
GLUCOSE UR QL: NEGATIVE — SIGNIFICANT CHANGE UP
KETONES UR-MCNC: NEGATIVE — SIGNIFICANT CHANGE UP
LEUKOCYTE ESTERASE UR-ACNC: NEGATIVE — SIGNIFICANT CHANGE UP
NITRITE UR-MCNC: NEGATIVE — SIGNIFICANT CHANGE UP
PH UR: 5.5 — SIGNIFICANT CHANGE UP (ref 5–8)
PROT UR-MCNC: NEGATIVE — SIGNIFICANT CHANGE UP
SP GR SPEC: 1.02 — SIGNIFICANT CHANGE UP (ref 1.01–1.02)
UROBILINOGEN FLD QL: SIGNIFICANT CHANGE UP

## 2022-08-19 ENCOUNTER — APPOINTMENT (OUTPATIENT)
Dept: HEMATOLOGY ONCOLOGY | Facility: CLINIC | Age: 67
End: 2022-08-19

## 2022-08-19 ENCOUNTER — RESULT REVIEW (OUTPATIENT)
Age: 67
End: 2022-08-19

## 2022-08-19 VITALS
HEART RATE: 59 BPM | DIASTOLIC BLOOD PRESSURE: 73 MMHG | OXYGEN SATURATION: 96 % | HEIGHT: 62 IN | WEIGHT: 149.01 LBS | SYSTOLIC BLOOD PRESSURE: 127 MMHG | BODY MASS INDEX: 27.42 KG/M2

## 2022-08-19 LAB
ALBUMIN SERPL ELPH-MCNC: 4.2 G/DL
ALP BLD-CCNC: 84 U/L
ALT SERPL-CCNC: 21 U/L
ANION GAP SERPL CALC-SCNC: 11 MMOL/L
AST SERPL-CCNC: 17 U/L
BASOPHILS # BLD AUTO: 0 K/UL — SIGNIFICANT CHANGE UP (ref 0–0.2)
BASOPHILS NFR BLD AUTO: 0.9 % — SIGNIFICANT CHANGE UP (ref 0–2)
BILIRUB SERPL-MCNC: 0.2 MG/DL
BUN SERPL-MCNC: 27 MG/DL
CALCIUM SERPL-MCNC: 9 MG/DL
CANCER AG125 SERPL-ACNC: 10 U/ML
CHLORIDE SERPL-SCNC: 106 MMOL/L
CO2 SERPL-SCNC: 26 MMOL/L
CREAT SERPL-MCNC: 0.64 MG/DL
EGFR: 97 ML/MIN/1.73M2
EOSINOPHIL # BLD AUTO: 0 K/UL — SIGNIFICANT CHANGE UP (ref 0–0.5)
EOSINOPHIL NFR BLD AUTO: 1.2 % — SIGNIFICANT CHANGE UP (ref 0–6)
GLUCOSE SERPL-MCNC: 103 MG/DL
HCT VFR BLD CALC: 37.6 % — SIGNIFICANT CHANGE UP (ref 34.5–45)
HGB BLD-MCNC: 12.6 G/DL — SIGNIFICANT CHANGE UP (ref 11.5–15.5)
LYMPHOCYTES # BLD AUTO: 0.9 K/UL — LOW (ref 1–3.3)
LYMPHOCYTES # BLD AUTO: 24.5 % — SIGNIFICANT CHANGE UP (ref 13–44)
MAGNESIUM SERPL-MCNC: 1.7 MG/DL
MCHC RBC-ENTMCNC: 31.1 PG — SIGNIFICANT CHANGE UP (ref 27–34)
MCHC RBC-ENTMCNC: 33.6 G/DL — SIGNIFICANT CHANGE UP (ref 32–36)
MCV RBC AUTO: 92.4 FL — SIGNIFICANT CHANGE UP (ref 80–100)
MONOCYTES # BLD AUTO: 0.4 K/UL — SIGNIFICANT CHANGE UP (ref 0–0.9)
MONOCYTES NFR BLD AUTO: 12 % — SIGNIFICANT CHANGE UP (ref 2–14)
NEUTROPHILS # BLD AUTO: 2.2 K/UL — SIGNIFICANT CHANGE UP (ref 1.8–7.4)
NEUTROPHILS NFR BLD AUTO: 61.5 % — SIGNIFICANT CHANGE UP (ref 43–77)
PLATELET # BLD AUTO: 120 K/UL — LOW (ref 150–400)
POTASSIUM SERPL-SCNC: 4.8 MMOL/L
PROT SERPL-MCNC: 6.2 G/DL
RBC # BLD: 4.06 M/UL — SIGNIFICANT CHANGE UP (ref 3.8–5.2)
RBC # FLD: 12.8 % — SIGNIFICANT CHANGE UP (ref 10.3–14.5)
SODIUM SERPL-SCNC: 143 MMOL/L
WBC # BLD: 3.6 K/UL — LOW (ref 3.8–10.5)
WBC # FLD AUTO: 3.6 K/UL — LOW (ref 3.8–10.5)

## 2022-08-19 PROCEDURE — 99214 OFFICE O/P EST MOD 30 MIN: CPT

## 2022-08-23 ENCOUNTER — APPOINTMENT (OUTPATIENT)
Dept: HEMATOLOGY ONCOLOGY | Facility: CLINIC | Age: 67
End: 2022-08-23

## 2022-08-23 ENCOUNTER — OUTPATIENT (OUTPATIENT)
Dept: OUTPATIENT SERVICES | Facility: HOSPITAL | Age: 67
LOS: 1 days | Discharge: HOME | End: 2022-08-23

## 2022-08-23 DIAGNOSIS — Z96.21 COCHLEAR IMPLANT STATUS: Chronic | ICD-10-CM

## 2022-08-23 DIAGNOSIS — Z98.890 OTHER SPECIFIED POSTPROCEDURAL STATES: Chronic | ICD-10-CM

## 2022-08-23 DIAGNOSIS — Z90.722 ACQUIRED ABSENCE OF OVARIES, BILATERAL: Chronic | ICD-10-CM

## 2022-08-23 DIAGNOSIS — Z98.51 TUBAL LIGATION STATUS: Chronic | ICD-10-CM

## 2022-08-23 DIAGNOSIS — C48.2 MALIGNANT NEOPLASM OF PERITONEUM, UNSPECIFIED: ICD-10-CM

## 2022-08-25 ENCOUNTER — RESULT REVIEW (OUTPATIENT)
Age: 67
End: 2022-08-25

## 2022-08-25 ENCOUNTER — APPOINTMENT (OUTPATIENT)
Age: 67
End: 2022-08-25

## 2022-08-25 LAB
BASOPHILS # BLD AUTO: 0.1 K/UL — SIGNIFICANT CHANGE UP (ref 0–0.2)
BASOPHILS NFR BLD AUTO: 1.1 % — SIGNIFICANT CHANGE UP (ref 0–2)
EOSINOPHIL # BLD AUTO: 0 K/UL — SIGNIFICANT CHANGE UP (ref 0–0.5)
EOSINOPHIL NFR BLD AUTO: 0.8 % — SIGNIFICANT CHANGE UP (ref 0–6)
HCT VFR BLD CALC: 38.4 % — SIGNIFICANT CHANGE UP (ref 34.5–45)
HGB BLD-MCNC: 12.8 G/DL — SIGNIFICANT CHANGE UP (ref 11.5–15.5)
LYMPHOCYTES # BLD AUTO: 1 K/UL — SIGNIFICANT CHANGE UP (ref 1–3.3)
LYMPHOCYTES # BLD AUTO: 21.1 % — SIGNIFICANT CHANGE UP (ref 13–44)
MCHC RBC-ENTMCNC: 31.2 PG — SIGNIFICANT CHANGE UP (ref 27–34)
MCHC RBC-ENTMCNC: 33.4 G/DL — SIGNIFICANT CHANGE UP (ref 32–36)
MCV RBC AUTO: 93.4 FL — SIGNIFICANT CHANGE UP (ref 80–100)
MONOCYTES # BLD AUTO: 0.7 K/UL — SIGNIFICANT CHANGE UP (ref 0–0.9)
MONOCYTES NFR BLD AUTO: 14.4 % — HIGH (ref 2–14)
NEUTROPHILS # BLD AUTO: 3 K/UL — SIGNIFICANT CHANGE UP (ref 1.8–7.4)
NEUTROPHILS NFR BLD AUTO: 62.5 % — SIGNIFICANT CHANGE UP (ref 43–77)
PLATELET # BLD AUTO: 145 K/UL — LOW (ref 150–400)
RBC # BLD: 4.11 M/UL — SIGNIFICANT CHANGE UP (ref 3.8–5.2)
RBC # FLD: 13.6 % — SIGNIFICANT CHANGE UP (ref 10.3–14.5)
WBC # BLD: 4.8 K/UL — SIGNIFICANT CHANGE UP (ref 3.8–10.5)
WBC # FLD AUTO: 4.8 K/UL — SIGNIFICANT CHANGE UP (ref 3.8–10.5)

## 2022-08-26 DIAGNOSIS — R11.2 NAUSEA WITH VOMITING, UNSPECIFIED: ICD-10-CM

## 2022-08-26 DIAGNOSIS — Z51.89 ENCOUNTER FOR OTHER SPECIFIED AFTERCARE: ICD-10-CM

## 2022-08-26 DIAGNOSIS — Z51.11 ENCOUNTER FOR ANTINEOPLASTIC CHEMOTHERAPY: ICD-10-CM

## 2022-08-26 LAB
APPEARANCE UR: CLEAR — SIGNIFICANT CHANGE UP
BILIRUB UR-MCNC: NEGATIVE — SIGNIFICANT CHANGE UP
COLOR SPEC: YELLOW — SIGNIFICANT CHANGE UP
DIFF PNL FLD: NEGATIVE — SIGNIFICANT CHANGE UP
GLUCOSE UR QL: NEGATIVE — SIGNIFICANT CHANGE UP
KETONES UR-MCNC: NEGATIVE — SIGNIFICANT CHANGE UP
LEUKOCYTE ESTERASE UR-ACNC: NEGATIVE — SIGNIFICANT CHANGE UP
NITRITE UR-MCNC: NEGATIVE — SIGNIFICANT CHANGE UP
PH UR: 5.5 — SIGNIFICANT CHANGE UP (ref 5–8)
PROT UR-MCNC: NEGATIVE — SIGNIFICANT CHANGE UP
SP GR SPEC: 1.02 — SIGNIFICANT CHANGE UP (ref 1.01–1.02)
UROBILINOGEN FLD QL: SIGNIFICANT CHANGE UP

## 2022-09-09 ENCOUNTER — RESULT REVIEW (OUTPATIENT)
Age: 67
End: 2022-09-09

## 2022-09-09 ENCOUNTER — APPOINTMENT (OUTPATIENT)
Dept: HEMATOLOGY ONCOLOGY | Facility: CLINIC | Age: 67
End: 2022-09-09

## 2022-09-09 VITALS
HEIGHT: 62 IN | BODY MASS INDEX: 27.24 KG/M2 | OXYGEN SATURATION: 96 % | DIASTOLIC BLOOD PRESSURE: 67 MMHG | WEIGHT: 148.01 LBS | HEART RATE: 73 BPM | SYSTOLIC BLOOD PRESSURE: 120 MMHG

## 2022-09-09 LAB
ANISOCYTOSIS BLD QL: SLIGHT — SIGNIFICANT CHANGE UP
BASOPHILS # BLD AUTO: 0 K/UL — SIGNIFICANT CHANGE UP (ref 0–0.2)
DACRYOCYTES BLD QL SMEAR: SLIGHT — SIGNIFICANT CHANGE UP
ELLIPTOCYTES BLD QL SMEAR: SLIGHT — SIGNIFICANT CHANGE UP
EOSINOPHIL # BLD AUTO: 0 K/UL — SIGNIFICANT CHANGE UP (ref 0–0.5)
EOSINOPHIL NFR BLD AUTO: 1 % — SIGNIFICANT CHANGE UP (ref 0–6)
GIANT PLATELETS BLD QL SMEAR: PRESENT — SIGNIFICANT CHANGE UP
HCT VFR BLD CALC: 36.6 % — SIGNIFICANT CHANGE UP (ref 34.5–45)
HGB BLD-MCNC: 12.2 G/DL — SIGNIFICANT CHANGE UP (ref 11.5–15.5)
LG PLATELETS BLD QL AUTO: SLIGHT — SIGNIFICANT CHANGE UP
LYMPHOCYTES # BLD AUTO: 1.1 K/UL — SIGNIFICANT CHANGE UP (ref 1–3.3)
LYMPHOCYTES # BLD AUTO: 18 % — SIGNIFICANT CHANGE UP (ref 13–44)
MACROCYTES BLD QL: SLIGHT — SIGNIFICANT CHANGE UP
MCHC RBC-ENTMCNC: 31.1 PG — SIGNIFICANT CHANGE UP (ref 27–34)
MCHC RBC-ENTMCNC: 33.2 G/DL — SIGNIFICANT CHANGE UP (ref 32–36)
MCV RBC AUTO: 93.8 FL — SIGNIFICANT CHANGE UP (ref 80–100)
MICROCYTES BLD QL: SLIGHT — SIGNIFICANT CHANGE UP
MONOCYTES # BLD AUTO: 0.5 K/UL — SIGNIFICANT CHANGE UP (ref 0–0.9)
MONOCYTES NFR BLD AUTO: 10 % — SIGNIFICANT CHANGE UP (ref 2–14)
NEUTROPHILS # BLD AUTO: 3.5 K/UL — SIGNIFICANT CHANGE UP (ref 1.8–7.4)
NEUTROPHILS NFR BLD AUTO: 69 % — SIGNIFICANT CHANGE UP (ref 43–77)
OVALOCYTES BLD QL SMEAR: SLIGHT — SIGNIFICANT CHANGE UP
PLAT MORPH BLD: NORMAL — SIGNIFICANT CHANGE UP
PLATELET # BLD AUTO: 115 K/UL — LOW (ref 150–400)
POIKILOCYTOSIS BLD QL AUTO: SLIGHT — SIGNIFICANT CHANGE UP
POLYCHROMASIA BLD QL SMEAR: SLIGHT — SIGNIFICANT CHANGE UP
RBC # BLD: 3.91 M/UL — SIGNIFICANT CHANGE UP (ref 3.8–5.2)
RBC # FLD: 14.5 % — SIGNIFICANT CHANGE UP (ref 10.3–14.5)
RBC BLD AUTO: SIGNIFICANT CHANGE UP
VARIANT LYMPHS # BLD: 2 % — SIGNIFICANT CHANGE UP (ref 0–6)
WBC # BLD: 5.1 K/UL — SIGNIFICANT CHANGE UP (ref 3.8–10.5)
WBC # FLD AUTO: 5.1 K/UL — SIGNIFICANT CHANGE UP (ref 3.8–10.5)

## 2022-09-09 PROCEDURE — 99214 OFFICE O/P EST MOD 30 MIN: CPT

## 2022-09-09 NOTE — RESULTS/DATA
[FreeTextEntry1] : 6/6/22 Debulking surgery\par Final Diagnosis\par \par 1. Omentum, partial Omentectomy ( 12 cm segment)\par \par - Omentum with adhesions and chronic inflammation\par - No atypia or malignancy identified\par \par 2. Uterus, corpus , hysterectomy\par - 35 Gram uterine corpus\par - Serosa: Adhesions and chronic inflammation\par - Cervix: Not seen, supra cervical specimen\par - Endometrium: Weakly proliferative with cystic atrophy. Endometrial polyp\par ( 1.0 cm)\par Negative for hyperplasia\par - Myometrium: Leiomyomas. Adenomyosis.\par - Negative for atypia or malignancy\par \par 3. Gastro colic ligament, excision\par - Fibrofatty tissue with scar, chronic inflammation, foreign body\par granuloma reaction\par - Negative for atypia or malignancy\par \par 5/5/22 Chest/ab/pelvis\par IMPRESSION:\par No gross evidence for carcinomatosis. 2.3 x 1.2 cm fluid density in the expected location of left adnexa this patient status post bilateral salpingo-oophorectomy. This may represent a small seroma or lymphocele. Minimal hazy soft tissue stranding in the supra colic omentum at site of previously visualized omental implant.\par \par 3/5/22 Chest CT \par IMPRESSION:\par Stable 3 mm right middle lobe nodule.\par \par Slightly larger right cardiophrenic lymphadenopathy. Stable left supradiaphragmatic lymph nodes.\par \par Partially visualized peritoneal nodularity in the left upper quadrant.\par \par \par Pathology 2/25/22 \par Accession:                             [id number]\par \par Collected Date/Time:                   2/25/2022 12:15 EST\par Received Date/Time:                    2/25/2022 12:15 EST\par \par Surgical Pathology Consultation Report - Auth (Verified)\par \par Specimen(s) Submitted\par \par 1. Slide Consult- Right omental nodule biopsy (11 slides, 87-F-,\par 1A1, PAX8, PAX8, CK7, CK7, WT1, P16, P53, WT1, P53)\par \par Final Diagnosis\par Submitting Institution:  [facility]\par \par \par Date of Procedure:  2/16/22\par \par Right omental nodule biopsy\par - Compatible with high grade serous carcinoma of adnexal or peritoneal\par origin, see note\par \par Note: In submitted immunostains, tumor cells are positive for CK7, PAX8,\par WT-1 and p16(diffuse). Tumor cells show aberrant expression(over -\par expression) for p53.\par \par 1/26/22 CT abdomen/pelvis \par multi lobular soft tissue masses involving the omentum most pronounced right side adjacent to the ascending colon and cecum. Additional nodularity adjacent to the tip of the cecum involving the peritoneum. 2.9 cm left hemipelvic cyst. Findings suspicious for ovarian neoplasia. \par

## 2022-09-09 NOTE — HISTORY OF PRESENT ILLNESS
[de-identified] : 65 yo F with h/o HTN, hyperlipidemia, GERD, decreased hearing (present since childhood) and depression who was diagnosed with serous carcinoma found in a right side omental nodule in February 2022.\par \par She saw her GYN, Dr. Melissa Jaimes, for her annual exam in November 2021.  A transvaginal U/S showed abnormal endometrial thickening and uterine fibroids. CT A/P performed on 1/26/22 revealed multiple lobular soft tissue masses involving the omentum most pronounced on the R. side adjacent to ascending colon and cecum. Additionally nodularity adjacent to tip of cecum involving peritoneum. 2.9 cm left hemipelvic cyst suspicious for ovarian neoplasia. 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 Patient was negative for BRCA.\par \par She was planned for exploratory laparotomy and excision of the mass with Dr. Barrie Avelar but had a second opinion with Dr. Flores who recommended further work up of the mass with an IR biopsy.  Biopsy done on 2/16/22 and results showed poorly differentiated carcinoma consistent with serous carcinoma. Immunohistochemical stains for CK-7, CK-20, PAX 8, P53, WT1 and P16 were performed on block 1A at [facility] and interpreted at [facility] as follows:  \par CK-7, PAX 8, P16, P53, WT1: Positive.  CK-20: Negative\par \par The patient was started on carbo/taxol/estuardo. 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 The patient underwent interval, optimal debulking surgery on 6/6/2022 after 3 cycles of neoadjuvant chemotherapy. She completed an additional 3 cycloes of chemotherapy and was started on bevacizumab maintenance.  [de-identified] :  She denies worsening abdominal pain, fevers, chills, nausea, vomiting, diarrhea, numbness, tingling, chest pain, SOB, headaches, weakness.

## 2022-09-09 NOTE — ASSESSMENT
[FreeTextEntry1] : # Primary Peritoneal Cancer, high grade serous, stage III, HRD positive \par - s/p 3 cycles of neoadjuvant chemotherapy with carbo/taxol/marce. Marce held at cycle 3 in anticipation of surgery. \par - s/p interval debulking surgery on 6/6/2022. Patient had optimal debulking surgery. \par -then s/p additional 3 cycles of carbo/taxol, currently on maintenance bevacizumab. \par - Plan to start olaparib with maintenance bevacizumab. Plan to start within next 3-4 weeks. \par - 1 month follow up. \par

## 2022-09-09 NOTE — PHYSICAL EXAM
[Fully active, able to carry on all pre-disease performance without restriction] : Status 0 - Fully active, able to carry on all pre-disease performance without restriction [Thin] : thin [Normal] : affect appropriate [de-identified] : Patient has bilateral hearing loss

## 2022-09-12 LAB
ALBUMIN SERPL ELPH-MCNC: 4.5 G/DL
ALP BLD-CCNC: 83 U/L
ALT SERPL-CCNC: 23 U/L
ANION GAP SERPL CALC-SCNC: 13 MMOL/L
AST SERPL-CCNC: 20 U/L
BILIRUB SERPL-MCNC: 0.3 MG/DL
BUN SERPL-MCNC: 25 MG/DL
CALCIUM SERPL-MCNC: 9.3 MG/DL
CHLORIDE SERPL-SCNC: 104 MMOL/L
CO2 SERPL-SCNC: 25 MMOL/L
CREAT SERPL-MCNC: 0.71 MG/DL
EGFR: 94 ML/MIN/1.73M2
GLUCOSE SERPL-MCNC: 106 MG/DL
MAGNESIUM SERPL-MCNC: 1.6 MG/DL
POTASSIUM SERPL-SCNC: 4.7 MMOL/L
PROT SERPL-MCNC: 6.5 G/DL
SODIUM SERPL-SCNC: 143 MMOL/L

## 2022-09-13 ENCOUNTER — APPOINTMENT (OUTPATIENT)
Dept: HEMATOLOGY ONCOLOGY | Facility: CLINIC | Age: 67
End: 2022-09-13

## 2022-09-15 ENCOUNTER — APPOINTMENT (OUTPATIENT)
Age: 67
End: 2022-09-15

## 2022-09-15 ENCOUNTER — RESULT REVIEW (OUTPATIENT)
Age: 67
End: 2022-09-15

## 2022-09-16 ENCOUNTER — RESULT REVIEW (OUTPATIENT)
Age: 67
End: 2022-09-16

## 2022-09-16 ENCOUNTER — APPOINTMENT (OUTPATIENT)
Age: 67
End: 2022-09-16

## 2022-09-16 LAB
BASOPHILS # BLD AUTO: 0 K/UL — SIGNIFICANT CHANGE UP (ref 0–0.2)
BASOPHILS NFR BLD AUTO: 0.7 % — SIGNIFICANT CHANGE UP (ref 0–2)
EOSINOPHIL # BLD AUTO: 0 K/UL — SIGNIFICANT CHANGE UP (ref 0–0.5)
EOSINOPHIL NFR BLD AUTO: 1 % — SIGNIFICANT CHANGE UP (ref 0–6)
HCT VFR BLD CALC: 35.7 % — SIGNIFICANT CHANGE UP (ref 34.5–45)
HGB BLD-MCNC: 12 G/DL — SIGNIFICANT CHANGE UP (ref 11.5–15.5)
LYMPHOCYTES # BLD AUTO: 0.8 K/UL — LOW (ref 1–3.3)
LYMPHOCYTES # BLD AUTO: 22.8 % — SIGNIFICANT CHANGE UP (ref 13–44)
MCHC RBC-ENTMCNC: 31.2 PG — SIGNIFICANT CHANGE UP (ref 27–34)
MCHC RBC-ENTMCNC: 33.6 G/DL — SIGNIFICANT CHANGE UP (ref 32–36)
MCV RBC AUTO: 92.8 FL — SIGNIFICANT CHANGE UP (ref 80–100)
MONOCYTES # BLD AUTO: 0.5 K/UL — SIGNIFICANT CHANGE UP (ref 0–0.9)
MONOCYTES NFR BLD AUTO: 12.9 % — SIGNIFICANT CHANGE UP (ref 2–14)
NEUTROPHILS # BLD AUTO: 2.2 K/UL — SIGNIFICANT CHANGE UP (ref 1.8–7.4)
NEUTROPHILS NFR BLD AUTO: 62.5 % — SIGNIFICANT CHANGE UP (ref 43–77)
PLATELET # BLD AUTO: 122 K/UL — LOW (ref 150–400)
RBC # BLD: 3.84 M/UL — SIGNIFICANT CHANGE UP (ref 3.8–5.2)
RBC # FLD: 15.2 % — HIGH (ref 10.3–14.5)
WBC # BLD: 3.6 K/UL — LOW (ref 3.8–10.5)
WBC # FLD AUTO: 3.6 K/UL — LOW (ref 3.8–10.5)

## 2022-09-22 NOTE — RESULTS/DATA
[FreeTextEntry1] : 6/6/22 Debulking surgery\par Final Diagnosis\par \par 1. Omentum, partial Omentectomy ( 12 cm segment)\par \par - Omentum with adhesions and chronic inflammation\par - No atypia or malignancy identified\par \par 2. Uterus, corpus , hysterectomy\par - 35 Gram uterine corpus\par - Serosa: Adhesions and chronic inflammation\par - Cervix: Not seen, supra cervical specimen\par - Endometrium: Weakly proliferative with cystic atrophy. Endometrial polyp\par ( 1.0 cm)\par Negative for hyperplasia\par - Myometrium: Leiomyomas. Adenomyosis.\par - Negative for atypia or malignancy\par \par 3. Gastro colic ligament, excision\par - Fibrofatty tissue with scar, chronic inflammation, foreign body\par granuloma reaction\par - Negative for atypia or malignancy\par \par 5/5/22 Chest/ab/pelvis\par IMPRESSION:\par No gross evidence for carcinomatosis. 2.3 x 1.2 cm fluid density in the expected location of left adnexa this patient status post bilateral salpingo-oophorectomy. This may represent a small seroma or lymphocele. Minimal hazy soft tissue stranding in the supra colic omentum at site of previously visualized omental implant.\par \par 3/5/22 Chest CT \par IMPRESSION:\par Stable 3 mm right middle lobe nodule.\par \par Slightly larger right cardiophrenic lymphadenopathy. Stable left supradiaphragmatic lymph nodes.\par \par Partially visualized peritoneal nodularity in the left upper quadrant.\par \par \par Pathology 2/25/22 \par Accession:                             [id number]\par \par Collected Date/Time:                   2/25/2022 12:15 EST\par Received Date/Time:                    2/25/2022 12:15 EST\par \par Surgical Pathology Consultation Report - Auth (Verified)\par \par Specimen(s) Submitted\par \par 1. Slide Consult- Right omental nodule biopsy (11 slides, 28-P-,\par 1A1, PAX8, PAX8, CK7, CK7, WT1, P16, P53, WT1, P53)\par \par Final Diagnosis\par Submitting Institution:  [facility]\par \par \par Date of Procedure:  2/16/22\par \par Right omental nodule biopsy\par - Compatible with high grade serous carcinoma of adnexal or peritoneal\par origin, see note\par \par Note: In submitted immunostains, tumor cells are positive for CK7, PAX8,\par WT-1 and p16(diffuse). Tumor cells show aberrant expression(over -\par expression) for p53.\par \par 1/26/22 CT abdomen/pelvis \par multi lobular soft tissue masses involving the omentum most pronounced right side adjacent to the ascending colon and cecum. Additional nodularity adjacent to the tip of the cecum involving the peritoneum. 2.9 cm left hemipelvic cyst. Findings suspicious for ovarian neoplasia. \par

## 2022-09-22 NOTE — ASSESSMENT
[FreeTextEntry1] : # Primary Peritoneal Cancer, high grade serous, stage III, HRD positive \par - s/p 3 cycles of neoadjuvant chemotherapy with carbo/taxol/marce. Marce held at cycle 3 in anticipation of surgery. \par - s/p ex-lap supracervical hysterectomy and omentectomy with Dr. Jone Flores on 6/6/22, final pathology did not reveal any residual disease.\par -Continue carbo/taxol + bevacizumab for high grade serous primary peritoneal carcinomatosis. Held bevacizumab with cycle 4 to allow for complete healing after resection. Started Marce on C5\par -Plan to start olaparib with maintenance bevacizumab after completion of chemotherapy and debulking surgery. \par -RTC Q3 weeks for treatment and MD/PA follow up on ~D15 each cycle\par -Will follow up with Dr. Murillo now that Dr. Flores has moved out of state\par -Dr Garces follow up after C6 to discuss further plan \par \par # Supportive\par -continue tylenol or ibuprofen for arthralgia/myalgia, declined need for narcotic pain medication \par -continues to work full time \par -denies any post-op related issues and incision appears well healed \par -constipation, much improved\par -appetite improved and weight relatively stable \par \par \par

## 2022-09-22 NOTE — PHYSICAL EXAM
[Fully active, able to carry on all pre-disease performance without restriction] : Status 0 - Fully active, able to carry on all pre-disease performance without restriction [Thin] : thin [Normal] : affect appropriate [de-identified] : Patient has bilateral hearing loss

## 2022-09-22 NOTE — HISTORY OF PRESENT ILLNESS
[de-identified] : 65 yo F with h/o HTN, hyperlipidemia, GERD, decreased hearing (present since childhood) and depression who was diagnosed with serous carcinoma found in a right side omental nodule in February 2022.\par \par She saw her GYN, Dr. Mleissa Jaimes, for her annual exam in November 2021.  A transvaginal U/S showed abnormal endometrial thickening and uterine fibroids. CT A/P performed on 1/26/22 revealed multiple lobular soft tissue masses involving the omentum most pronounced on the R. side adjacent to ascending colon and cecum. Additionally nodularity adjacent to tip of cecum involving peritoneum. 2.9 cm left hemipelvic cyst suspicious for ovarian neoplasia. 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 Patient was negative for BRCA.\par \par She was planned for exploratory laparotomy and excision of the mass with Dr. Barrie Avelar but had a second opinion with Dr. Flores who recommended further work up of the mass with an IR biopsy.  Biopsy done on 2/16/22 and results showed poorly differentiated carcinoma consistent with serous carcinoma. Immunohistochemical stains for CK-7, CK-20, PAX 8, P53, WT1 and P16 were performed on block 1A at [facility] and interpreted at [facility] as follows:  \par CK-7, PAX 8, P16, P53, WT1: Positive.  CK-20: Negative\par \par The patient was started on carbo/taxol/estuardo. 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 The patient underwent interval, optimal debulking surgery on 6/6/2022 after 3 cycles of neoadjuvant chemotherapy.  [de-identified] : Patient presents on C5D16 carbo/taxol for high grade serous primary peritoneal carcinomatosis s/p ex-lap supracervical hysterectomy and omentectomy with Dr. Jone Flores on 6/6/22\par + Arthralgia/myalgia, hips, knees, ankles. + Fatigue, unchanged. + Decreased appetite, improved. + Dysgeusia, improved. + Constipation, much improved. No N/V/D.  No edema. No flushing. No fevers, cough, SOB.  No incisional pain or post-op related pain.\par \par   The patient is a 85y Female complaining of shortness of breath.

## 2022-09-23 ENCOUNTER — APPOINTMENT (OUTPATIENT)
Dept: CT IMAGING | Facility: CLINIC | Age: 67
End: 2022-09-23

## 2022-09-23 ENCOUNTER — OUTPATIENT (OUTPATIENT)
Dept: OUTPATIENT SERVICES | Facility: HOSPITAL | Age: 67
LOS: 1 days | End: 2022-09-23

## 2022-09-23 DIAGNOSIS — Z96.21 COCHLEAR IMPLANT STATUS: Chronic | ICD-10-CM

## 2022-09-23 DIAGNOSIS — Z90.722 ACQUIRED ABSENCE OF OVARIES, BILATERAL: Chronic | ICD-10-CM

## 2022-09-23 DIAGNOSIS — Z98.51 TUBAL LIGATION STATUS: Chronic | ICD-10-CM

## 2022-09-23 DIAGNOSIS — Z00.8 ENCOUNTER FOR OTHER GENERAL EXAMINATION: ICD-10-CM

## 2022-09-23 DIAGNOSIS — Z98.890 OTHER SPECIFIED POSTPROCEDURAL STATES: Chronic | ICD-10-CM

## 2022-09-23 PROCEDURE — 74177 CT ABD & PELVIS W/CONTRAST: CPT | Mod: 26

## 2022-10-05 ENCOUNTER — TRANSCRIPTION ENCOUNTER (OUTPATIENT)
Age: 67
End: 2022-10-05

## 2022-10-05 RX ORDER — CEPHALEXIN 500 MG/1
500 TABLET ORAL
Qty: 20 | Refills: 0 | Status: DISCONTINUED | COMMUNITY
Start: 2022-03-17 | End: 2022-10-05

## 2022-10-06 ENCOUNTER — APPOINTMENT (OUTPATIENT)
Age: 67
End: 2022-10-06

## 2022-10-07 ENCOUNTER — RESULT REVIEW (OUTPATIENT)
Age: 67
End: 2022-10-07

## 2022-10-07 ENCOUNTER — APPOINTMENT (OUTPATIENT)
Dept: HEMATOLOGY ONCOLOGY | Facility: CLINIC | Age: 67
End: 2022-10-07

## 2022-10-07 ENCOUNTER — APPOINTMENT (OUTPATIENT)
Age: 67
End: 2022-10-07

## 2022-10-07 VITALS
SYSTOLIC BLOOD PRESSURE: 145 MMHG | BODY MASS INDEX: 27.06 KG/M2 | WEIGHT: 147.05 LBS | HEART RATE: 67 BPM | OXYGEN SATURATION: 99 % | HEIGHT: 62 IN | DIASTOLIC BLOOD PRESSURE: 78 MMHG

## 2022-10-07 LAB
ALBUMIN SERPL ELPH-MCNC: 4.2 G/DL — SIGNIFICANT CHANGE UP (ref 3.3–5)
ALP SERPL-CCNC: 60 U/L — SIGNIFICANT CHANGE UP (ref 40–120)
ALT FLD-CCNC: 14 U/L — SIGNIFICANT CHANGE UP (ref 10–45)
ANION GAP SERPL CALC-SCNC: 13 MMOL/L — SIGNIFICANT CHANGE UP (ref 5–17)
AST SERPL-CCNC: 20 U/L — SIGNIFICANT CHANGE UP (ref 10–40)
BASOPHILS # BLD AUTO: 0 K/UL — SIGNIFICANT CHANGE UP (ref 0–0.2)
BASOPHILS NFR BLD AUTO: 0.9 % — SIGNIFICANT CHANGE UP (ref 0–2)
BILIRUB SERPL-MCNC: 0.4 MG/DL — SIGNIFICANT CHANGE UP (ref 0.2–1.2)
BUN SERPL-MCNC: 23 MG/DL — SIGNIFICANT CHANGE UP (ref 7–23)
CALCIUM SERPL-MCNC: 9.4 MG/DL — SIGNIFICANT CHANGE UP (ref 8.4–10.5)
CHLORIDE SERPL-SCNC: 101 MMOL/L — SIGNIFICANT CHANGE UP (ref 96–108)
CO2 SERPL-SCNC: 25 MMOL/L — SIGNIFICANT CHANGE UP (ref 22–31)
CREAT SERPL-MCNC: 0.64 MG/DL — SIGNIFICANT CHANGE UP (ref 0.5–1.3)
EGFR: 97 ML/MIN/1.73M2 — SIGNIFICANT CHANGE UP
EOSINOPHIL # BLD AUTO: 0.1 K/UL — SIGNIFICANT CHANGE UP (ref 0–0.5)
EOSINOPHIL NFR BLD AUTO: 2.3 % — SIGNIFICANT CHANGE UP (ref 0–6)
GLUCOSE SERPL-MCNC: 118 MG/DL — HIGH (ref 70–99)
HCT VFR BLD CALC: 38.2 % — SIGNIFICANT CHANGE UP (ref 34.5–45)
HGB BLD-MCNC: 12.7 G/DL — SIGNIFICANT CHANGE UP (ref 11.5–15.5)
LYMPHOCYTES # BLD AUTO: 1.3 K/UL — SIGNIFICANT CHANGE UP (ref 1–3.3)
LYMPHOCYTES # BLD AUTO: 24.9 % — SIGNIFICANT CHANGE UP (ref 13–44)
MCHC RBC-ENTMCNC: 32.9 PG — SIGNIFICANT CHANGE UP (ref 27–34)
MCHC RBC-ENTMCNC: 33.3 G/DL — SIGNIFICANT CHANGE UP (ref 32–36)
MCV RBC AUTO: 98.8 FL — SIGNIFICANT CHANGE UP (ref 80–100)
MONOCYTES # BLD AUTO: 0.5 K/UL — SIGNIFICANT CHANGE UP (ref 0–0.9)
MONOCYTES NFR BLD AUTO: 9 % — SIGNIFICANT CHANGE UP (ref 2–14)
NEUTROPHILS # BLD AUTO: 3.3 K/UL — SIGNIFICANT CHANGE UP (ref 1.8–7.4)
NEUTROPHILS NFR BLD AUTO: 63 % — SIGNIFICANT CHANGE UP (ref 43–77)
PLATELET # BLD AUTO: 144 K/UL — LOW (ref 150–400)
POTASSIUM SERPL-MCNC: 3.8 MMOL/L — SIGNIFICANT CHANGE UP (ref 3.5–5.3)
POTASSIUM SERPL-SCNC: 3.8 MMOL/L — SIGNIFICANT CHANGE UP (ref 3.5–5.3)
PROT SERPL-MCNC: 6.7 G/DL — SIGNIFICANT CHANGE UP (ref 6–8.3)
RBC # BLD: 3.86 M/UL — SIGNIFICANT CHANGE UP (ref 3.8–5.2)
RBC # FLD: 13.9 % — SIGNIFICANT CHANGE UP (ref 10.3–14.5)
SODIUM SERPL-SCNC: 138 MMOL/L — SIGNIFICANT CHANGE UP (ref 135–145)
WBC # BLD: 5.3 K/UL — SIGNIFICANT CHANGE UP (ref 3.8–10.5)
WBC # FLD AUTO: 5.3 K/UL — SIGNIFICANT CHANGE UP (ref 3.8–10.5)

## 2022-10-07 PROCEDURE — 99214 OFFICE O/P EST MOD 30 MIN: CPT

## 2022-10-07 NOTE — PHYSICAL EXAM
[Fully active, able to carry on all pre-disease performance without restriction] : Status 0 - Fully active, able to carry on all pre-disease performance without restriction [Thin] : thin [Normal] : affect appropriate [de-identified] : Patient has bilateral hearing loss

## 2022-10-07 NOTE — ASSESSMENT
[FreeTextEntry1] : # Primary Peritoneal Cancer, high grade serous, stage III, HRD positive \par - s/p 3 cycles of neoadjuvant chemotherapy with carbo/taxol/marce. Marce held at cycle 3 in anticipation of surgery. \par - s/p interval debulking surgery on 6/6/2022. Patient had optimal debulking surgery. \par -then s/p additional 3 cycles of carbo/taxol, currently on maintenance bevacizumab. \par - Plan to start olaparib with maintenance bevacizumab. Plan to start within next 3-4 weeks. \par - Patient started on olaparib, initially at dose reduction. Patient tolerating well. Will Increase olaparib to 300 mg BID. \par - follow up lab work in 1 week, follow up for visit in two weeks  to assess side effects.

## 2022-10-07 NOTE — HISTORY OF PRESENT ILLNESS
[de-identified] : 65 yo F with h/o HTN, hyperlipidemia, GERD, decreased hearing (present since childhood) and depression who was diagnosed with serous carcinoma found in a right side omental nodule in February 2022.\par \par She saw her GYN, Dr. Melissa Jaimes, for her annual exam in November 2021.  A transvaginal U/S showed abnormal endometrial thickening and uterine fibroids. CT A/P performed on 1/26/22 revealed multiple lobular soft tissue masses involving the omentum most pronounced on the R. side adjacent to ascending colon and cecum. Additionally nodularity adjacent to tip of cecum involving peritoneum. 2.9 cm left hemipelvic cyst suspicious for ovarian neoplasia. 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 Patient was negative for BRCA.\par \par She was planned for exploratory laparotomy and excision of the mass with Dr. Barrie Avelar but had a second opinion with Dr. Flores who recommended further work up of the mass with an IR biopsy.  Biopsy done on 2/16/22 and results showed poorly differentiated carcinoma consistent with serous carcinoma. Immunohistochemical stains for CK-7, CK-20, PAX 8, P53, WT1 and P16 were performed on block 1A at [facility] and interpreted at [facility] as follows:  \par CK-7, PAX 8, P16, P53, WT1: Positive.  CK-20: Negative\par \par The patient was started on carbo/taxol/estuardo. 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 The patient underwent interval, optimal debulking surgery on 6/6/2022 after 3 cycles of neoadjuvant chemotherapy. She completed an additional 3 cycloes of chemotherapy and was started on bevacizumab maintenance. Olaparib was added.  [de-identified] : Patient started lynparza approximately 1 week ago. She endorses slight mucositis since starting lynparza, but states that mucositis now resolved. She denies diarrhea, nausea, vomiting, abdominal pain, bleeding.

## 2022-10-07 NOTE — RESULTS/DATA
No
[FreeTextEntry1] : 6/6/22 Debulking surgery\par Final Diagnosis\par \par 1. Omentum, partial Omentectomy ( 12 cm segment)\par \par - Omentum with adhesions and chronic inflammation\par - No atypia or malignancy identified\par \par 2. Uterus, corpus , hysterectomy\par - 35 Gram uterine corpus\par - Serosa: Adhesions and chronic inflammation\par - Cervix: Not seen, supra cervical specimen\par - Endometrium: Weakly proliferative with cystic atrophy. Endometrial polyp\par ( 1.0 cm)\par Negative for hyperplasia\par - Myometrium: Leiomyomas. Adenomyosis.\par - Negative for atypia or malignancy\par \par 3. Gastro colic ligament, excision\par - Fibrofatty tissue with scar, chronic inflammation, foreign body\par granuloma reaction\par - Negative for atypia or malignancy\par \par 5/5/22 Chest/ab/pelvis\par IMPRESSION:\par No gross evidence for carcinomatosis. 2.3 x 1.2 cm fluid density in the expected location of left adnexa this patient status post bilateral salpingo-oophorectomy. This may represent a small seroma or lymphocele. Minimal hazy soft tissue stranding in the supra colic omentum at site of previously visualized omental implant.\par \par 3/5/22 Chest CT \par IMPRESSION:\par Stable 3 mm right middle lobe nodule.\par \par Slightly larger right cardiophrenic lymphadenopathy. Stable left supradiaphragmatic lymph nodes.\par \par Partially visualized peritoneal nodularity in the left upper quadrant.\par \par \par Pathology 2/25/22 \par Accession:                             [id number]\par \par Collected Date/Time:                   2/25/2022 12:15 EST\par Received Date/Time:                    2/25/2022 12:15 EST\par \par Surgical Pathology Consultation Report - Auth (Verified)\par \par Specimen(s) Submitted\par \par 1. Slide Consult- Right omental nodule biopsy (11 slides, 04-Y-,\par 1A1, PAX8, PAX8, CK7, CK7, WT1, P16, P53, WT1, P53)\par \par Final Diagnosis\par Submitting Institution:  [facility]\par \par \par Date of Procedure:  2/16/22\par \par Right omental nodule biopsy\par - Compatible with high grade serous carcinoma of adnexal or peritoneal\par origin, see note\par \par Note: In submitted immunostains, tumor cells are positive for CK7, PAX8,\par WT-1 and p16(diffuse). Tumor cells show aberrant expression(over -\par expression) for p53.\par \par 1/26/22 CT abdomen/pelvis \par multi lobular soft tissue masses involving the omentum most pronounced right side adjacent to the ascending colon and cecum. Additional nodularity adjacent to the tip of the cecum involving the peritoneum. 2.9 cm left hemipelvic cyst. Findings suspicious for ovarian neoplasia. \par

## 2022-10-08 LAB
APPEARANCE UR: CLEAR — SIGNIFICANT CHANGE UP
BILIRUB UR-MCNC: NEGATIVE — SIGNIFICANT CHANGE UP
COLOR SPEC: SIGNIFICANT CHANGE UP
DIFF PNL FLD: NEGATIVE — SIGNIFICANT CHANGE UP
GLUCOSE UR QL: NEGATIVE — SIGNIFICANT CHANGE UP
KETONES UR-MCNC: NEGATIVE — SIGNIFICANT CHANGE UP
LEUKOCYTE ESTERASE UR-ACNC: NEGATIVE — SIGNIFICANT CHANGE UP
NITRITE UR-MCNC: NEGATIVE — SIGNIFICANT CHANGE UP
PH UR: 6.5 — SIGNIFICANT CHANGE UP (ref 5–8)
PROT UR-MCNC: NEGATIVE — SIGNIFICANT CHANGE UP
SP GR SPEC: 1.02 — SIGNIFICANT CHANGE UP (ref 1.01–1.02)
UROBILINOGEN FLD QL: SIGNIFICANT CHANGE UP

## 2022-10-10 ENCOUNTER — TRANSCRIPTION ENCOUNTER (OUTPATIENT)
Age: 67
End: 2022-10-10

## 2022-10-10 ENCOUNTER — APPOINTMENT (OUTPATIENT)
Dept: GYNECOLOGIC ONCOLOGY | Facility: CLINIC | Age: 67
End: 2022-10-10

## 2022-10-10 VITALS
HEART RATE: 62 BPM | WEIGHT: 147 LBS | DIASTOLIC BLOOD PRESSURE: 75 MMHG | SYSTOLIC BLOOD PRESSURE: 170 MMHG | HEIGHT: 62 IN | OXYGEN SATURATION: 98 % | BODY MASS INDEX: 27.05 KG/M2

## 2022-10-10 PROCEDURE — 99213 OFFICE O/P EST LOW 20 MIN: CPT

## 2022-10-12 ENCOUNTER — TRANSCRIPTION ENCOUNTER (OUTPATIENT)
Age: 67
End: 2022-10-12

## 2022-10-14 ENCOUNTER — APPOINTMENT (OUTPATIENT)
Dept: HEMATOLOGY ONCOLOGY | Facility: CLINIC | Age: 67
End: 2022-10-14

## 2022-10-14 ENCOUNTER — RESULT REVIEW (OUTPATIENT)
Age: 67
End: 2022-10-14

## 2022-10-14 LAB
BASOPHILS # BLD AUTO: 0 K/UL — SIGNIFICANT CHANGE UP (ref 0–0.2)
BASOPHILS NFR BLD AUTO: 0.8 % — SIGNIFICANT CHANGE UP (ref 0–2)
EOSINOPHIL # BLD AUTO: 0.1 K/UL — SIGNIFICANT CHANGE UP (ref 0–0.5)
EOSINOPHIL NFR BLD AUTO: 2.3 % — SIGNIFICANT CHANGE UP (ref 0–6)
HCT VFR BLD CALC: 38.2 % — SIGNIFICANT CHANGE UP (ref 34.5–45)
HGB BLD-MCNC: 12.7 G/DL — SIGNIFICANT CHANGE UP (ref 11.5–15.5)
LYMPHOCYTES # BLD AUTO: 1.1 K/UL — SIGNIFICANT CHANGE UP (ref 1–3.3)
LYMPHOCYTES # BLD AUTO: 21.4 % — SIGNIFICANT CHANGE UP (ref 13–44)
MCHC RBC-ENTMCNC: 33.1 G/DL — SIGNIFICANT CHANGE UP (ref 32–36)
MCHC RBC-ENTMCNC: 33.2 PG — SIGNIFICANT CHANGE UP (ref 27–34)
MCV RBC AUTO: 100.2 FL — HIGH (ref 80–100)
MONOCYTES # BLD AUTO: 0.5 K/UL — SIGNIFICANT CHANGE UP (ref 0–0.9)
MONOCYTES NFR BLD AUTO: 9.4 % — SIGNIFICANT CHANGE UP (ref 2–14)
NEUTROPHILS # BLD AUTO: 3.4 K/UL — SIGNIFICANT CHANGE UP (ref 1.8–7.4)
NEUTROPHILS NFR BLD AUTO: 66.2 % — SIGNIFICANT CHANGE UP (ref 43–77)
PLATELET # BLD AUTO: 142 K/UL — LOW (ref 150–400)
RBC # BLD: 3.81 M/UL — SIGNIFICANT CHANGE UP (ref 3.8–5.2)
RBC # FLD: 14.5 % — SIGNIFICANT CHANGE UP (ref 10.3–14.5)
WBC # BLD: 5.2 K/UL — SIGNIFICANT CHANGE UP (ref 3.8–10.5)
WBC # FLD AUTO: 5.2 K/UL — SIGNIFICANT CHANGE UP (ref 3.8–10.5)

## 2022-10-17 LAB
ALBUMIN SERPL ELPH-MCNC: 4.3 G/DL
ALP BLD-CCNC: 61 U/L
ALT SERPL-CCNC: 15 U/L
ANION GAP SERPL CALC-SCNC: 9 MMOL/L
AST SERPL-CCNC: 17 U/L
BILIRUB SERPL-MCNC: 0.3 MG/DL
BUN SERPL-MCNC: 26 MG/DL
CALCIUM SERPL-MCNC: 9.3 MG/DL
CHLORIDE SERPL-SCNC: 104 MMOL/L
CO2 SERPL-SCNC: 28 MMOL/L
CREAT SERPL-MCNC: 0.7 MG/DL
EGFR: 95 ML/MIN/1.73M2
GLUCOSE SERPL-MCNC: 97 MG/DL
MAGNESIUM SERPL-MCNC: 1.8 MG/DL
POTASSIUM SERPL-SCNC: 4.4 MMOL/L
PROT SERPL-MCNC: 6.8 G/DL
SODIUM SERPL-SCNC: 141 MMOL/L

## 2022-10-20 NOTE — HISTORY OF PRESENT ILLNESS
[FreeTextEntry1] : 68 y/o patient with Stage III High Grade Serous PPC, s/p Ex-Lap Supracervical hysterectomy, omentectomy at [facility] on 6/6/22 with Dr. Jone Flores.  She is currently undergoing maintenance treatment with Dr. Garces, s/p completion of systemic therapy with Carbo/Taxol on 8/25/22. Per Dr. Garces's office note, plan to start Olaparib with maintenance estuardo within the next 3-4 weeks.  Most recent Avastin was on 10/7.\par \par She initially presented to her GYN, Dr. Melissa Jaimes, for her annual exam in November 2021. A transvaginal U/S showed abnormal endometrial thickening and uterine fibroids. CT A/P performed on 1/26/22 revealed multiple lobular soft tissue masses involving the omentum most pronounced on the R. side adjacent to ascending colon and cecum. Additionally nodularity adjacent to tip of cecum involving peritoneum. 2.9 cm left hemipelvic cyst suspicious for ovarian neoplasia. 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 Patient was negative for BRCA.\par \par She was planned for exploratory laparotomy and excision of the mass with Dr. Barrie Avelar but had a second opinion with Dr. Flores who recommended further work up of the mass with an IR biopsy. Biopsy done on 2/16/22 and results showed poorly differentiated carcinoma consistent with serous carcinoma. Immunohistochemical stains for CK-7, CK-20, PAX 8, P53, WT1 and P16 were performed on block 1A at [facility] and interpreted at [facility] as follows: \par CK-7, PAX 8, P16, P53, WT1: Positive. CK-20: Negative\par \par The patient was started on carbo/taxol/estuardo. The patient was found to be HRD positive by Certain testing. The patient underwent interval, optimal debulking surgery on 6/6/2022 after 3 cycles of neoadjuvant chemotherapy. She completed an additional 3 cycles of chemotherapy and was started on bevacizumab maintenance.\par \par Ca125 8/19/22 was 10\par CT A/P (9/23/22) - No evidence of recurrence.\par \par \par Health Maintenance:\par Mammo (11/19/21) - BI-RADS 2, benign\par \par Colonoscopy/EDG (5/2018) - Normal colonoscopy return PRN; repeat EDG in 3 years due to polyps (one w/ minimal reactive gastropathy)

## 2022-10-20 NOTE — ASSESSMENT
[FreeTextEntry1] : 67 year old who has a history of Stage III HG serous PPC. The patient is doing well based on today's exam, continue maintenance treatment. Discussed with patient importance of monitoring symptoms while on maintenance drugs. She knows to make my office aware of worsening symptoms in which she may consider dose reduction. Patient will return in 3 months for a routine svl visit.

## 2022-10-20 NOTE — PHYSICAL EXAM
[Chaperone Present] : A chaperone was present in the examining room during all aspects of the physical examination [Abnormal] : Examination of vagina: Abnormal [Atrophy] : atrophy [No Bleeding] : there was no active vaginal bleeding [Absent] : Adnexa(ae): Absent [Normal] : Recto-Vaginal Exam: Normal [FreeTextEntry1] : Xuan Shearer Medical assistant chaperoned during gynecologic exam.  [de-identified] : Vertical midline incision  [de-identified] : external and internal

## 2022-10-20 NOTE — END OF VISIT
[FreeTextEntry3] : Written by Xuan Shearer, acting as a scribe for Dr. Urmila Reyes This note accurately reflects the work and decisions made by me.

## 2022-10-20 NOTE — REASON FOR VISIT
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

## 2022-10-24 ENCOUNTER — TRANSCRIPTION ENCOUNTER (OUTPATIENT)
Age: 67
End: 2022-10-24

## 2022-10-25 ENCOUNTER — TRANSCRIPTION ENCOUNTER (OUTPATIENT)
Age: 67
End: 2022-10-25

## 2022-10-25 ENCOUNTER — OUTPATIENT (OUTPATIENT)
Dept: OUTPATIENT SERVICES | Facility: HOSPITAL | Age: 67
LOS: 1 days | Discharge: HOME | End: 2022-10-25

## 2022-10-25 DIAGNOSIS — Z96.21 COCHLEAR IMPLANT STATUS: Chronic | ICD-10-CM

## 2022-10-25 DIAGNOSIS — C48.2 MALIGNANT NEOPLASM OF PERITONEUM, UNSPECIFIED: ICD-10-CM

## 2022-10-25 DIAGNOSIS — Z90.722 ACQUIRED ABSENCE OF OVARIES, BILATERAL: Chronic | ICD-10-CM

## 2022-10-25 DIAGNOSIS — Z98.890 OTHER SPECIFIED POSTPROCEDURAL STATES: Chronic | ICD-10-CM

## 2022-10-25 DIAGNOSIS — Z98.51 TUBAL LIGATION STATUS: Chronic | ICD-10-CM

## 2022-10-28 ENCOUNTER — RESULT REVIEW (OUTPATIENT)
Age: 67
End: 2022-10-28

## 2022-10-28 ENCOUNTER — APPOINTMENT (OUTPATIENT)
Dept: HEMATOLOGY ONCOLOGY | Facility: CLINIC | Age: 67
End: 2022-10-28

## 2022-10-28 ENCOUNTER — APPOINTMENT (OUTPATIENT)
Age: 67
End: 2022-10-28

## 2022-10-28 VITALS
OXYGEN SATURATION: 98 % | BODY MASS INDEX: 27.06 KG/M2 | WEIGHT: 147.05 LBS | HEIGHT: 62 IN | DIASTOLIC BLOOD PRESSURE: 76 MMHG | SYSTOLIC BLOOD PRESSURE: 150 MMHG | HEART RATE: 71 BPM

## 2022-10-28 LAB
ALBUMIN SERPL ELPH-MCNC: 4.1 G/DL — SIGNIFICANT CHANGE UP (ref 3.3–5)
ALP SERPL-CCNC: 55 U/L — SIGNIFICANT CHANGE UP (ref 40–120)
ALT FLD-CCNC: 14 U/L — SIGNIFICANT CHANGE UP (ref 10–45)
ANION GAP SERPL CALC-SCNC: 12 MMOL/L — SIGNIFICANT CHANGE UP (ref 5–17)
AST SERPL-CCNC: 23 U/L — SIGNIFICANT CHANGE UP (ref 10–40)
BASOPHILS # BLD AUTO: 0.1 K/UL — SIGNIFICANT CHANGE UP (ref 0–0.2)
BASOPHILS NFR BLD AUTO: 1.4 % — SIGNIFICANT CHANGE UP (ref 0–2)
BILIRUB SERPL-MCNC: 0.4 MG/DL — SIGNIFICANT CHANGE UP (ref 0.2–1.2)
BUN SERPL-MCNC: 23 MG/DL — SIGNIFICANT CHANGE UP (ref 7–23)
CALCIUM SERPL-MCNC: 9.3 MG/DL — SIGNIFICANT CHANGE UP (ref 8.4–10.5)
CHLORIDE SERPL-SCNC: 101 MMOL/L — SIGNIFICANT CHANGE UP (ref 96–108)
CO2 SERPL-SCNC: 24 MMOL/L — SIGNIFICANT CHANGE UP (ref 22–31)
CREAT SERPL-MCNC: 0.66 MG/DL — SIGNIFICANT CHANGE UP (ref 0.5–1.3)
EGFR: 97 ML/MIN/1.73M2 — SIGNIFICANT CHANGE UP
EOSINOPHIL # BLD AUTO: 0 K/UL — SIGNIFICANT CHANGE UP (ref 0–0.5)
EOSINOPHIL NFR BLD AUTO: 1.1 % — SIGNIFICANT CHANGE UP (ref 0–6)
GLUCOSE SERPL-MCNC: 135 MG/DL — HIGH (ref 70–99)
HCT VFR BLD CALC: 34.8 % — SIGNIFICANT CHANGE UP (ref 34.5–45)
HGB BLD-MCNC: 12.1 G/DL — SIGNIFICANT CHANGE UP (ref 11.5–15.5)
LYMPHOCYTES # BLD AUTO: 1.1 K/UL — SIGNIFICANT CHANGE UP (ref 1–3.3)
LYMPHOCYTES # BLD AUTO: 25.7 % — SIGNIFICANT CHANGE UP (ref 13–44)
MCHC RBC-ENTMCNC: 34.7 G/DL — SIGNIFICANT CHANGE UP (ref 32–36)
MCHC RBC-ENTMCNC: 34.8 PG — HIGH (ref 27–34)
MCV RBC AUTO: 100.2 FL — HIGH (ref 80–100)
MONOCYTES # BLD AUTO: 0.4 K/UL — SIGNIFICANT CHANGE UP (ref 0–0.9)
MONOCYTES NFR BLD AUTO: 8.4 % — SIGNIFICANT CHANGE UP (ref 2–14)
NEUTROPHILS # BLD AUTO: 2.8 K/UL — SIGNIFICANT CHANGE UP (ref 1.8–7.4)
NEUTROPHILS NFR BLD AUTO: 63.4 % — SIGNIFICANT CHANGE UP (ref 43–77)
PLATELET # BLD AUTO: 156 K/UL — SIGNIFICANT CHANGE UP (ref 150–400)
POTASSIUM SERPL-MCNC: 4 MMOL/L — SIGNIFICANT CHANGE UP (ref 3.5–5.3)
POTASSIUM SERPL-SCNC: 4 MMOL/L — SIGNIFICANT CHANGE UP (ref 3.5–5.3)
PROT SERPL-MCNC: 6.5 G/DL — SIGNIFICANT CHANGE UP (ref 6–8.3)
RBC # BLD: 3.47 M/UL — LOW (ref 3.8–5.2)
RBC # FLD: 13.5 % — SIGNIFICANT CHANGE UP (ref 10.3–14.5)
SODIUM SERPL-SCNC: 137 MMOL/L — SIGNIFICANT CHANGE UP (ref 135–145)
WBC # BLD: 4.4 K/UL — SIGNIFICANT CHANGE UP (ref 3.8–10.5)
WBC # FLD AUTO: 4.4 K/UL — SIGNIFICANT CHANGE UP (ref 3.8–10.5)

## 2022-10-28 PROCEDURE — 99214 OFFICE O/P EST MOD 30 MIN: CPT

## 2022-10-29 LAB
APPEARANCE UR: CLEAR — SIGNIFICANT CHANGE UP
BILIRUB UR-MCNC: NEGATIVE — SIGNIFICANT CHANGE UP
COLOR SPEC: COLORLESS — SIGNIFICANT CHANGE UP
DIFF PNL FLD: NEGATIVE — SIGNIFICANT CHANGE UP
GLUCOSE UR QL: NEGATIVE — SIGNIFICANT CHANGE UP
KETONES UR-MCNC: NEGATIVE — SIGNIFICANT CHANGE UP
LEUKOCYTE ESTERASE UR-ACNC: NEGATIVE — SIGNIFICANT CHANGE UP
NITRITE UR-MCNC: NEGATIVE — SIGNIFICANT CHANGE UP
PH UR: 6.5 — SIGNIFICANT CHANGE UP (ref 5–8)
PROT UR-MCNC: NEGATIVE — SIGNIFICANT CHANGE UP
SP GR SPEC: 1.01 — LOW (ref 1.01–1.02)
UROBILINOGEN FLD QL: SIGNIFICANT CHANGE UP

## 2022-10-31 DIAGNOSIS — R11.2 NAUSEA WITH VOMITING, UNSPECIFIED: ICD-10-CM

## 2022-10-31 DIAGNOSIS — Z51.11 ENCOUNTER FOR ANTINEOPLASTIC CHEMOTHERAPY: ICD-10-CM

## 2022-11-11 NOTE — RESULTS/DATA
[FreeTextEntry1] : 6/6/22 Debulking surgery\par Final Diagnosis\par \par 1. Omentum, partial Omentectomy ( 12 cm segment)\par \par - Omentum with adhesions and chronic inflammation\par - No atypia or malignancy identified\par \par 2. Uterus, corpus , hysterectomy\par - 35 Gram uterine corpus\par - Serosa: Adhesions and chronic inflammation\par - Cervix: Not seen, supra cervical specimen\par - Endometrium: Weakly proliferative with cystic atrophy. Endometrial polyp\par ( 1.0 cm)\par Negative for hyperplasia\par - Myometrium: Leiomyomas. Adenomyosis.\par - Negative for atypia or malignancy\par \par 3. Gastro colic ligament, excision\par - Fibrofatty tissue with scar, chronic inflammation, foreign body\par granuloma reaction\par - Negative for atypia or malignancy\par \par 5/5/22 Chest/ab/pelvis\par IMPRESSION:\par No gross evidence for carcinomatosis. 2.3 x 1.2 cm fluid density in the expected location of left adnexa this patient status post bilateral salpingo-oophorectomy. This may represent a small seroma or lymphocele. Minimal hazy soft tissue stranding in the supra colic omentum at site of previously visualized omental implant.\par \par 3/5/22 Chest CT \par IMPRESSION:\par Stable 3 mm right middle lobe nodule.\par \par Slightly larger right cardiophrenic lymphadenopathy. Stable left supradiaphragmatic lymph nodes.\par \par Partially visualized peritoneal nodularity in the left upper quadrant.\par \par \par Pathology 2/25/22 \par Accession:                             [id number]\par \par Collected Date/Time:                   2/25/2022 12:15 EST\par Received Date/Time:                    2/25/2022 12:15 EST\par \par Surgical Pathology Consultation Report - Auth (Verified)\par \par Specimen(s) Submitted\par \par 1. Slide Consult- Right omental nodule biopsy (11 slides, 27-X-,\par 1A1, PAX8, PAX8, CK7, CK7, WT1, P16, P53, WT1, P53)\par \par Final Diagnosis\par Submitting Institution:  [facility]\par \par \par Date of Procedure:  2/16/22\par \par Right omental nodule biopsy\par - Compatible with high grade serous carcinoma of adnexal or peritoneal\par origin, see note\par \par Note: In submitted immunostains, tumor cells are positive for CK7, PAX8,\par WT-1 and p16(diffuse). Tumor cells show aberrant expression(over -\par expression) for p53.\par \par 1/26/22 CT abdomen/pelvis \par multi lobular soft tissue masses involving the omentum most pronounced right side adjacent to the ascending colon and cecum. Additional nodularity adjacent to the tip of the cecum involving the peritoneum. 2.9 cm left hemipelvic cyst. Findings suspicious for ovarian neoplasia. \par

## 2022-11-11 NOTE — PHYSICAL EXAM
[Fully active, able to carry on all pre-disease performance without restriction] : Status 0 - Fully active, able to carry on all pre-disease performance without restriction [Thin] : thin [Normal] : affect appropriate [de-identified] : Patient has bilateral hearing loss

## 2022-11-11 NOTE — HISTORY OF PRESENT ILLNESS
[de-identified] : 65 yo F with h/o HTN, hyperlipidemia, GERD, decreased hearing (present since childhood) and depression who was diagnosed with serous carcinoma found in a right side omental nodule in February 2022.\par \par She saw her GYN, Dr. Melissa Jaimes, for her annual exam in November 2021.  A transvaginal U/S showed abnormal endometrial thickening and uterine fibroids. CT A/P performed on 1/26/22 revealed multiple lobular soft tissue masses involving the omentum most pronounced on the R. side adjacent to ascending colon and cecum. Additionally nodularity adjacent to tip of cecum involving peritoneum. 2.9 cm left hemipelvic cyst suspicious for ovarian neoplasia. 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 Patient was negative for BRCA.\par \par She was planned for exploratory laparotomy and excision of the mass with Dr. Barrie Avelar but had a second opinion with Dr. Flores who recommended further work up of the mass with an IR biopsy.  Biopsy done on 2/16/22 and results showed poorly differentiated carcinoma consistent with serous carcinoma. Immunohistochemical stains for CK-7, CK-20, PAX 8, P53, WT1 and P16 were performed on block 1A at [facility] and interpreted at [facility] as follows:  \par CK-7, PAX 8, P16, P53, WT1: Positive.  CK-20: Negative\par \par The patient was started on carbo/taxol/estuardo. 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 The patient underwent interval, optimal debulking surgery on 6/6/2022 after 3 cycles of neoadjuvant chemotherapy. She completed an additional 3 cycloes of chemotherapy and was started on bevacizumab maintenance. Olaparib was added.  [de-identified] : Patient presents on daily Lynparza and Q3 week Marce for high grade serous primary peritoneal carcinomatosis s/p ex-lap supracervical hysterectomy and omentectomy with Dr. Jone Flores on 6/6/22.\par + Mucositis x 1 week, now resolved. + Decreased appetite, improved with recent dose reduction. + Arthralgia/myalgia, LEs from hips to feet. + Fatigue. + Non-productive intermittent cough with sore "scratchy" throat. + Dysgeusia improving. + Dyspepsia. + Nail changes. + Baseline xeroderma, no worse. No N/V/D, epistaxis, fever or SOB.\par \par

## 2022-11-11 NOTE — ASSESSMENT
[FreeTextEntry1] : # Primary Peritoneal Cancer, high grade serous, stage III, HRD positive \par - s/p 3 cycles of neoadjuvant chemotherapy with carbo/taxol/marce. Marce held at cycle 3 in anticipation of surgery. \par - s/p interval debulking surgery on 6/6/2022. Patient had optimal debulking surgery. \par - then s/p additional 3 cycles of carbo/taxol, then started on maintenance bevacizumab. \par - Patient started on olaparib, initially at dose reduction. Patient was tolerating well and dose increased to 300 mg BID. But patient then had mucositis and decreased appetite with significant weight loss so dose was reduced back to 200mg BID.  Will remain on this dose until she returns from a planned vacation to [place].\par - CBC stable, no anemia, thrombocytopenia or neutropenia. \par - RTC Q3 weeks for Marce with MD/PA follow up Q3-6 weeks

## 2022-11-17 ENCOUNTER — TRANSCRIPTION ENCOUNTER (OUTPATIENT)
Age: 67
End: 2022-11-17

## 2022-11-18 ENCOUNTER — APPOINTMENT (OUTPATIENT)
Age: 67
End: 2022-11-18

## 2022-11-18 ENCOUNTER — RESULT REVIEW (OUTPATIENT)
Age: 67
End: 2022-11-18

## 2022-11-18 ENCOUNTER — OFFICE (OUTPATIENT)
Dept: URBAN - METROPOLITAN AREA CLINIC 115 | Facility: CLINIC | Age: 67
Setting detail: OPHTHALMOLOGY
End: 2022-11-18
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
## 2022-11-18 LAB
ALBUMIN SERPL ELPH-MCNC: 3.9 G/DL — SIGNIFICANT CHANGE UP (ref 3.3–5)
ALP SERPL-CCNC: 57 U/L — SIGNIFICANT CHANGE UP (ref 40–120)
ALT FLD-CCNC: 13 U/L — SIGNIFICANT CHANGE UP (ref 10–45)
ANION GAP SERPL CALC-SCNC: 12 MMOL/L — SIGNIFICANT CHANGE UP (ref 5–17)
ANISOCYTOSIS BLD QL: SLIGHT — SIGNIFICANT CHANGE UP
AST SERPL-CCNC: 23 U/L — SIGNIFICANT CHANGE UP (ref 10–40)
BASOPHILS # BLD AUTO: 0 K/UL — SIGNIFICANT CHANGE UP (ref 0–0.2)
BASOPHILS NFR BLD AUTO: 0.6 % — SIGNIFICANT CHANGE UP (ref 0–2)
BILIRUB SERPL-MCNC: 0.3 MG/DL — SIGNIFICANT CHANGE UP (ref 0.2–1.2)
BUN SERPL-MCNC: 17 MG/DL — SIGNIFICANT CHANGE UP (ref 7–23)
CALCIUM SERPL-MCNC: 9.4 MG/DL — SIGNIFICANT CHANGE UP (ref 8.4–10.5)
CHLORIDE SERPL-SCNC: 99 MMOL/L — SIGNIFICANT CHANGE UP (ref 96–108)
CO2 SERPL-SCNC: 25 MMOL/L — SIGNIFICANT CHANGE UP (ref 22–31)
CREAT SERPL-MCNC: 0.66 MG/DL — SIGNIFICANT CHANGE UP (ref 0.5–1.3)
EGFR: 97 ML/MIN/1.73M2 — SIGNIFICANT CHANGE UP
ELLIPTOCYTES BLD QL SMEAR: SLIGHT — SIGNIFICANT CHANGE UP
EOSINOPHIL # BLD AUTO: 0 K/UL — SIGNIFICANT CHANGE UP (ref 0–0.5)
EOSINOPHIL NFR BLD AUTO: 1 % — SIGNIFICANT CHANGE UP (ref 0–6)
GIANT PLATELETS BLD QL SMEAR: PRESENT — SIGNIFICANT CHANGE UP
GLUCOSE SERPL-MCNC: 130 MG/DL — HIGH (ref 70–99)
HCT VFR BLD CALC: 40 % — SIGNIFICANT CHANGE UP (ref 34.5–45)
HGB BLD-MCNC: 13.6 G/DL — SIGNIFICANT CHANGE UP (ref 11.5–15.5)
LG PLATELETS BLD QL AUTO: SLIGHT — SIGNIFICANT CHANGE UP
LYMPHOCYTES # BLD AUTO: 0.6 K/UL — LOW (ref 1–3.3)
LYMPHOCYTES # BLD AUTO: 17 % — SIGNIFICANT CHANGE UP (ref 13–44)
MACROCYTES BLD QL: SLIGHT — SIGNIFICANT CHANGE UP
MCHC RBC-ENTMCNC: 33.9 G/DL — SIGNIFICANT CHANGE UP (ref 32–36)
MCHC RBC-ENTMCNC: 34.8 PG — HIGH (ref 27–34)
MCV RBC AUTO: 102.4 FL — HIGH (ref 80–100)
MICROCYTES BLD QL: SLIGHT — SIGNIFICANT CHANGE UP
MONOCYTES # BLD AUTO: 0.5 K/UL — SIGNIFICANT CHANGE UP (ref 0–0.9)
MONOCYTES NFR BLD AUTO: 18 % — HIGH (ref 2–14)
NEUTROPHILS # BLD AUTO: 2 K/UL — SIGNIFICANT CHANGE UP (ref 1.8–7.4)
NEUTROPHILS NFR BLD AUTO: 62 % — SIGNIFICANT CHANGE UP (ref 43–77)
NRBC # BLD: 1 /100 — HIGH (ref 0–0)
OVALOCYTES BLD QL SMEAR: SLIGHT — SIGNIFICANT CHANGE UP
PLAT MORPH BLD: NORMAL — SIGNIFICANT CHANGE UP
PLATELET # BLD AUTO: 108 K/UL — LOW (ref 150–400)
POIKILOCYTOSIS BLD QL AUTO: SLIGHT — SIGNIFICANT CHANGE UP
POLYCHROMASIA BLD QL SMEAR: SLIGHT — SIGNIFICANT CHANGE UP
POTASSIUM SERPL-MCNC: 4 MMOL/L — SIGNIFICANT CHANGE UP (ref 3.5–5.3)
POTASSIUM SERPL-SCNC: 4 MMOL/L — SIGNIFICANT CHANGE UP (ref 3.5–5.3)
PROT SERPL-MCNC: 6.8 G/DL — SIGNIFICANT CHANGE UP (ref 6–8.3)
RBC # BLD: 3.91 M/UL — SIGNIFICANT CHANGE UP (ref 3.8–5.2)
RBC # FLD: 14 % — SIGNIFICANT CHANGE UP (ref 10.3–14.5)
RBC BLD AUTO: SIGNIFICANT CHANGE UP
SODIUM SERPL-SCNC: 136 MMOL/L — SIGNIFICANT CHANGE UP (ref 135–145)
VARIANT LYMPHS # BLD: 2 % — SIGNIFICANT CHANGE UP (ref 0–6)
WBC # BLD: 3.2 K/UL — LOW (ref 3.8–10.5)
WBC # FLD AUTO: 3.2 K/UL — LOW (ref 3.8–10.5)

## 2022-11-18 PROCEDURE — NO SHOW FE NO SHOW FEE: Performed by: OPHTHALMOLOGY

## 2022-11-19 LAB
APPEARANCE UR: CLEAR — SIGNIFICANT CHANGE UP
BACTERIA # UR AUTO: NEGATIVE — SIGNIFICANT CHANGE UP
BILIRUB UR-MCNC: NEGATIVE — SIGNIFICANT CHANGE UP
COLOR SPEC: YELLOW — SIGNIFICANT CHANGE UP
DIFF PNL FLD: NEGATIVE — SIGNIFICANT CHANGE UP
EPI CELLS # UR: 2 /HPF — SIGNIFICANT CHANGE UP (ref 0–5)
GLUCOSE UR QL: NEGATIVE — SIGNIFICANT CHANGE UP
HYALINE CASTS # UR AUTO: 0 /LPF — SIGNIFICANT CHANGE UP (ref 0–7)
KETONES UR-MCNC: NEGATIVE — SIGNIFICANT CHANGE UP
LEUKOCYTE ESTERASE UR-ACNC: NEGATIVE — SIGNIFICANT CHANGE UP
NITRITE UR-MCNC: NEGATIVE — SIGNIFICANT CHANGE UP
PH UR: 6 — SIGNIFICANT CHANGE UP (ref 5–8)
PROT UR-MCNC: ABNORMAL
RBC CASTS # UR COMP ASSIST: 2 /HPF — SIGNIFICANT CHANGE UP (ref 0–4)
SP GR SPEC: 1.03 — HIGH (ref 1.01–1.02)
UROBILINOGEN FLD QL: SIGNIFICANT CHANGE UP
WBC UR QL: 5 /HPF — SIGNIFICANT CHANGE UP (ref 0–5)

## 2022-11-25 ENCOUNTER — TRANSCRIPTION ENCOUNTER (OUTPATIENT)
Age: 67
End: 2022-11-25

## 2022-12-09 ENCOUNTER — RESULT REVIEW (OUTPATIENT)
Age: 67
End: 2022-12-09

## 2022-12-09 ENCOUNTER — APPOINTMENT (OUTPATIENT)
Dept: HEMATOLOGY ONCOLOGY | Facility: CLINIC | Age: 67
End: 2022-12-09

## 2022-12-09 ENCOUNTER — APPOINTMENT (OUTPATIENT)
Age: 67
End: 2022-12-09

## 2022-12-09 VITALS
SYSTOLIC BLOOD PRESSURE: 135 MMHG | TEMPERATURE: 97.9 F | HEIGHT: 62.4 IN | BODY MASS INDEX: 27.07 KG/M2 | HEART RATE: 72 BPM | OXYGEN SATURATION: 98 % | WEIGHT: 149 LBS | DIASTOLIC BLOOD PRESSURE: 81 MMHG

## 2022-12-09 LAB
BASOPHILS # BLD AUTO: 0.1 K/UL — SIGNIFICANT CHANGE UP (ref 0–0.2)
BASOPHILS NFR BLD AUTO: 1.1 % — SIGNIFICANT CHANGE UP (ref 0–2)
EOSINOPHIL # BLD AUTO: 0.1 K/UL — SIGNIFICANT CHANGE UP (ref 0–0.5)
EOSINOPHIL NFR BLD AUTO: 1.1 % — SIGNIFICANT CHANGE UP (ref 0–6)
HCT VFR BLD CALC: 36.2 % — SIGNIFICANT CHANGE UP (ref 34.5–45)
HGB BLD-MCNC: 13 G/DL — SIGNIFICANT CHANGE UP (ref 11.5–15.5)
LYMPHOCYTES # BLD AUTO: 1.2 K/UL — SIGNIFICANT CHANGE UP (ref 1–3.3)
LYMPHOCYTES # BLD AUTO: 24 % — SIGNIFICANT CHANGE UP (ref 13–44)
MCHC RBC-ENTMCNC: 35.9 G/DL — SIGNIFICANT CHANGE UP (ref 32–36)
MCHC RBC-ENTMCNC: 36.5 PG — HIGH (ref 27–34)
MCV RBC AUTO: 101.6 FL — HIGH (ref 80–100)
MONOCYTES # BLD AUTO: 0.6 K/UL — SIGNIFICANT CHANGE UP (ref 0–0.9)
MONOCYTES NFR BLD AUTO: 11.8 % — SIGNIFICANT CHANGE UP (ref 2–14)
NEUTROPHILS # BLD AUTO: 3.1 K/UL — SIGNIFICANT CHANGE UP (ref 1.8–7.4)
NEUTROPHILS NFR BLD AUTO: 62 % — SIGNIFICANT CHANGE UP (ref 43–77)
PLATELET # BLD AUTO: 171 K/UL — SIGNIFICANT CHANGE UP (ref 150–400)
RBC # BLD: 3.56 M/UL — LOW (ref 3.8–5.2)
RBC # FLD: 14.5 % — SIGNIFICANT CHANGE UP (ref 10.3–14.5)
WBC # BLD: 4.9 K/UL — SIGNIFICANT CHANGE UP (ref 3.8–10.5)
WBC # FLD AUTO: 4.9 K/UL — SIGNIFICANT CHANGE UP (ref 3.8–10.5)

## 2022-12-09 PROCEDURE — 99214 OFFICE O/P EST MOD 30 MIN: CPT

## 2022-12-09 NOTE — PHYSICAL EXAM
[Fully active, able to carry on all pre-disease performance without restriction] : Status 0 - Fully active, able to carry on all pre-disease performance without restriction [Thin] : thin [Normal] : affect appropriate [de-identified] : Patient has bilateral hearing loss

## 2022-12-09 NOTE — HISTORY OF PRESENT ILLNESS
[de-identified] : 67 yo F with h/o HTN, hyperlipidemia, GERD, decreased hearing (present since childhood) and depression who was diagnosed with serous carcinoma found in a right side omental nodule in February 2022.\par \par She saw her GYN, Dr. Melissa Jaimes, for her annual exam in November 2021.  A transvaginal U/S showed abnormal endometrial thickening and uterine fibroids. CT A/P performed on 1/26/22 revealed multiple lobular soft tissue masses involving the omentum most pronounced on the R. side adjacent to ascending colon and cecum. Additionally nodularity adjacent to tip of cecum involving peritoneum. 2.9 cm left hemipelvic cyst suspicious for ovarian neoplasia. 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 Patient was negative for BRCA.\par \par She was planned for exploratory laparotomy and excision of the mass with Dr. Barrie Avelar but had a second opinion with Dr. Flores who recommended further work up of the mass with an IR biopsy.  Biopsy done on 2/16/22 and results showed poorly differentiated carcinoma consistent with serous carcinoma. Immunohistochemical stains for CK-7, CK-20, PAX 8, P53, WT1 and P16 were performed on block 1A at [facility] and interpreted at [facility] as follows:  \par CK-7, PAX 8, P16, P53, WT1: Positive.  CK-20: Negative\par \par The patient was started on carbo/taxol/estuardo. 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 The patient underwent interval, optimal debulking surgery on 6/6/2022 after 3 cycles of neoadjuvant chemotherapy. She completed an additional 3 cycloes of chemotherapy and was started on bevacizumab maintenance. Olaparib was added.  [de-identified] : \par Persistent + Arthralgia/myalgia, LEs from hips to feet, improved since dose reduction. . + Fatigue, improved with dose reduction.  + Baseline xeroderma, no worse. No N/V/D, epistaxis, fever or SOB.\par \par

## 2022-12-09 NOTE — ASSESSMENT
[FreeTextEntry1] : # Primary Peritoneal Cancer, high grade serous, stage III, HRD positive \par - s/p 3 cycles of neoadjuvant chemotherapy with carbo/taxol/marce. Marce held at cycle 3 in anticipation of surgery. \par - s/p interval debulking surgery on 6/6/2022. Patient had optimal debulking surgery. \par - then s/p additional 3 cycles of carbo/taxol, then started on maintenance bevacizumab. \par - Patient started on olaparib, initially at dose reduction. Patient was tolerating well and dose increased to 300 mg BID. But patient then had mucositis and decreased appetite with significant weight loss so dose was reduced back to 200mg BID.  Patient tolerating this dose well. \par - CBC stable, no anemia, thrombocytopenia or neutropenia. \par - RTC Q3 weeks for Marce with MD/PA follow up 1 month \par

## 2022-12-09 NOTE — RESULTS/DATA
[FreeTextEntry1] : 6/6/22 Debulking surgery\par Final Diagnosis\par \par 1. Omentum, partial Omentectomy ( 12 cm segment)\par \par - Omentum with adhesions and chronic inflammation\par - No atypia or malignancy identified\par \par 2. Uterus, corpus , hysterectomy\par - 35 Gram uterine corpus\par - Serosa: Adhesions and chronic inflammation\par - Cervix: Not seen, supra cervical specimen\par - Endometrium: Weakly proliferative with cystic atrophy. Endometrial polyp\par ( 1.0 cm)\par Negative for hyperplasia\par - Myometrium: Leiomyomas. Adenomyosis.\par - Negative for atypia or malignancy\par \par 3. Gastro colic ligament, excision\par - Fibrofatty tissue with scar, chronic inflammation, foreign body\par granuloma reaction\par - Negative for atypia or malignancy\par \par 5/5/22 Chest/ab/pelvis\par IMPRESSION:\par No gross evidence for carcinomatosis. 2.3 x 1.2 cm fluid density in the expected location of left adnexa this patient status post bilateral salpingo-oophorectomy. This may represent a small seroma or lymphocele. Minimal hazy soft tissue stranding in the supra colic omentum at site of previously visualized omental implant.\par \par 3/5/22 Chest CT \par IMPRESSION:\par Stable 3 mm right middle lobe nodule.\par \par Slightly larger right cardiophrenic lymphadenopathy. Stable left supradiaphragmatic lymph nodes.\par \par Partially visualized peritoneal nodularity in the left upper quadrant.\par \par \par Pathology 2/25/22 \par Accession:                             [id number]\par \par Collected Date/Time:                   2/25/2022 12:15 EST\par Received Date/Time:                    2/25/2022 12:15 EST\par \par Surgical Pathology Consultation Report - Auth (Verified)\par \par Specimen(s) Submitted\par \par 1. Slide Consult- Right omental nodule biopsy (11 slides, 82-V-,\par 1A1, PAX8, PAX8, CK7, CK7, WT1, P16, P53, WT1, P53)\par \par Final Diagnosis\par Submitting Institution:  [facility]\par \par \par Date of Procedure:  2/16/22\par \par Right omental nodule biopsy\par - Compatible with high grade serous carcinoma of adnexal or peritoneal\par origin, see note\par \par Note: In submitted immunostains, tumor cells are positive for CK7, PAX8,\par WT-1 and p16(diffuse). Tumor cells show aberrant expression(over -\par expression) for p53.\par \par 1/26/22 CT abdomen/pelvis \par multi lobular soft tissue masses involving the omentum most pronounced right side adjacent to the ascending colon and cecum. Additional nodularity adjacent to the tip of the cecum involving the peritoneum. 2.9 cm left hemipelvic cyst. Findings suspicious for ovarian neoplasia. \par

## 2022-12-10 LAB
APPEARANCE UR: CLEAR — SIGNIFICANT CHANGE UP
BILIRUB UR-MCNC: NEGATIVE — SIGNIFICANT CHANGE UP
COLOR SPEC: YELLOW — SIGNIFICANT CHANGE UP
DIFF PNL FLD: NEGATIVE — SIGNIFICANT CHANGE UP
GLUCOSE UR QL: NEGATIVE — SIGNIFICANT CHANGE UP
KETONES UR-MCNC: NEGATIVE — SIGNIFICANT CHANGE UP
LEUKOCYTE ESTERASE UR-ACNC: NEGATIVE — SIGNIFICANT CHANGE UP
NITRITE UR-MCNC: NEGATIVE — SIGNIFICANT CHANGE UP
PH UR: 6 — SIGNIFICANT CHANGE UP (ref 5–8)
PROT UR-MCNC: SIGNIFICANT CHANGE UP
SP GR SPEC: 1.03 — HIGH (ref 1.01–1.02)
UROBILINOGEN FLD QL: SIGNIFICANT CHANGE UP

## 2022-12-12 LAB
ALBUMIN SERPL ELPH-MCNC: 4.1 G/DL
ALP BLD-CCNC: 61 U/L
ALT SERPL-CCNC: 16 U/L
ANION GAP SERPL CALC-SCNC: 11 MMOL/L
AST SERPL-CCNC: 19 U/L
BILIRUB SERPL-MCNC: 0.3 MG/DL
BUN SERPL-MCNC: 22 MG/DL
CALCIUM SERPL-MCNC: 9.2 MG/DL
CANCER AG125 SERPL-ACNC: 10 U/ML
CHLORIDE SERPL-SCNC: 102 MMOL/L
CO2 SERPL-SCNC: 27 MMOL/L
CREAT SERPL-MCNC: 0.86 MG/DL
EGFR: 74 ML/MIN/1.73M2
GLUCOSE SERPL-MCNC: 113 MG/DL
POTASSIUM SERPL-SCNC: 4.1 MMOL/L
PROT SERPL-MCNC: 6.3 G/DL
SODIUM SERPL-SCNC: 140 MMOL/L

## 2022-12-21 ENCOUNTER — OUTPATIENT (OUTPATIENT)
Dept: OUTPATIENT SERVICES | Facility: HOSPITAL | Age: 67
LOS: 1 days | Discharge: HOME | End: 2022-12-21

## 2022-12-21 DIAGNOSIS — Z98.51 TUBAL LIGATION STATUS: Chronic | ICD-10-CM

## 2022-12-21 DIAGNOSIS — Z98.890 OTHER SPECIFIED POSTPROCEDURAL STATES: Chronic | ICD-10-CM

## 2022-12-21 DIAGNOSIS — C48.2 MALIGNANT NEOPLASM OF PERITONEUM, UNSPECIFIED: ICD-10-CM

## 2022-12-21 DIAGNOSIS — Z96.21 COCHLEAR IMPLANT STATUS: Chronic | ICD-10-CM

## 2022-12-21 DIAGNOSIS — Z90.722 ACQUIRED ABSENCE OF OVARIES, BILATERAL: Chronic | ICD-10-CM

## 2022-12-27 ENCOUNTER — TRANSCRIPTION ENCOUNTER (OUTPATIENT)
Age: 67
End: 2022-12-27

## 2022-12-30 ENCOUNTER — RESULT REVIEW (OUTPATIENT)
Age: 67
End: 2022-12-30

## 2022-12-30 ENCOUNTER — APPOINTMENT (OUTPATIENT)
Age: 67
End: 2022-12-30

## 2022-12-30 LAB
ALBUMIN SERPL ELPH-MCNC: 4.3 G/DL — SIGNIFICANT CHANGE UP (ref 3.3–5)
ALP SERPL-CCNC: 58 U/L — SIGNIFICANT CHANGE UP (ref 40–120)
ALT FLD-CCNC: 16 U/L — SIGNIFICANT CHANGE UP (ref 10–45)
ANION GAP SERPL CALC-SCNC: 10 MMOL/L — SIGNIFICANT CHANGE UP (ref 5–17)
APPEARANCE UR: CLEAR — SIGNIFICANT CHANGE UP
AST SERPL-CCNC: 19 U/L — SIGNIFICANT CHANGE UP (ref 10–40)
BASOPHILS # BLD AUTO: 0 K/UL — SIGNIFICANT CHANGE UP (ref 0–0.2)
BASOPHILS NFR BLD AUTO: 1.2 % — SIGNIFICANT CHANGE UP (ref 0–2)
BILIRUB SERPL-MCNC: 0.6 MG/DL — SIGNIFICANT CHANGE UP (ref 0.2–1.2)
BILIRUB UR-MCNC: NEGATIVE — SIGNIFICANT CHANGE UP
BUN SERPL-MCNC: 20 MG/DL — SIGNIFICANT CHANGE UP (ref 7–23)
CALCIUM SERPL-MCNC: 9.5 MG/DL — SIGNIFICANT CHANGE UP (ref 8.4–10.5)
CHLORIDE SERPL-SCNC: 101 MMOL/L — SIGNIFICANT CHANGE UP (ref 96–108)
CO2 SERPL-SCNC: 26 MMOL/L — SIGNIFICANT CHANGE UP (ref 22–31)
COLOR SPEC: SIGNIFICANT CHANGE UP
CREAT SERPL-MCNC: 0.73 MG/DL — SIGNIFICANT CHANGE UP (ref 0.5–1.3)
DIFF PNL FLD: NEGATIVE — SIGNIFICANT CHANGE UP
EGFR: 91 ML/MIN/1.73M2 — SIGNIFICANT CHANGE UP
EOSINOPHIL # BLD AUTO: 0 K/UL — SIGNIFICANT CHANGE UP (ref 0–0.5)
EOSINOPHIL NFR BLD AUTO: 1.2 % — SIGNIFICANT CHANGE UP (ref 0–6)
GLUCOSE SERPL-MCNC: 104 MG/DL — HIGH (ref 70–99)
GLUCOSE UR QL: NEGATIVE — SIGNIFICANT CHANGE UP
HCT VFR BLD CALC: 38.9 % — SIGNIFICANT CHANGE UP (ref 34.5–45)
HGB BLD-MCNC: 13.5 G/DL — SIGNIFICANT CHANGE UP (ref 11.5–15.5)
KETONES UR-MCNC: NEGATIVE — SIGNIFICANT CHANGE UP
LEUKOCYTE ESTERASE UR-ACNC: NEGATIVE — SIGNIFICANT CHANGE UP
LYMPHOCYTES # BLD AUTO: 1 K/UL — SIGNIFICANT CHANGE UP (ref 1–3.3)
LYMPHOCYTES # BLD AUTO: 24.7 % — SIGNIFICANT CHANGE UP (ref 13–44)
MCHC RBC-ENTMCNC: 34.6 G/DL — SIGNIFICANT CHANGE UP (ref 32–36)
MCHC RBC-ENTMCNC: 36.5 PG — HIGH (ref 27–34)
MCV RBC AUTO: 105.5 FL — HIGH (ref 80–100)
MONOCYTES # BLD AUTO: 0.5 K/UL — SIGNIFICANT CHANGE UP (ref 0–0.9)
MONOCYTES NFR BLD AUTO: 11.7 % — SIGNIFICANT CHANGE UP (ref 2–14)
NEUTROPHILS # BLD AUTO: 2.4 K/UL — SIGNIFICANT CHANGE UP (ref 1.8–7.4)
NEUTROPHILS NFR BLD AUTO: 61.2 % — SIGNIFICANT CHANGE UP (ref 43–77)
NITRITE UR-MCNC: NEGATIVE — SIGNIFICANT CHANGE UP
PH UR: 6 — SIGNIFICANT CHANGE UP (ref 5–8)
PLATELET # BLD AUTO: 162 K/UL — SIGNIFICANT CHANGE UP (ref 150–400)
POTASSIUM SERPL-MCNC: 4.5 MMOL/L — SIGNIFICANT CHANGE UP (ref 3.5–5.3)
POTASSIUM SERPL-SCNC: 4.5 MMOL/L — SIGNIFICANT CHANGE UP (ref 3.5–5.3)
PROT SERPL-MCNC: 6.8 G/DL — SIGNIFICANT CHANGE UP (ref 6–8.3)
PROT UR-MCNC: NEGATIVE — SIGNIFICANT CHANGE UP
RBC # BLD: 3.69 M/UL — LOW (ref 3.8–5.2)
RBC # FLD: 14.2 % — SIGNIFICANT CHANGE UP (ref 10.3–14.5)
SODIUM SERPL-SCNC: 137 MMOL/L — SIGNIFICANT CHANGE UP (ref 135–145)
SP GR SPEC: 1.02 — SIGNIFICANT CHANGE UP (ref 1.01–1.02)
UROBILINOGEN FLD QL: SIGNIFICANT CHANGE UP
WBC # BLD: 3.9 K/UL — SIGNIFICANT CHANGE UP (ref 3.8–10.5)
WBC # FLD AUTO: 3.9 K/UL — SIGNIFICANT CHANGE UP (ref 3.8–10.5)

## 2023-01-03 DIAGNOSIS — Z51.11 ENCOUNTER FOR ANTINEOPLASTIC CHEMOTHERAPY: ICD-10-CM

## 2023-01-03 DIAGNOSIS — R11.2 NAUSEA WITH VOMITING, UNSPECIFIED: ICD-10-CM

## 2023-01-04 ENCOUNTER — TRANSCRIPTION ENCOUNTER (OUTPATIENT)
Age: 68
End: 2023-01-04

## 2023-01-04 DIAGNOSIS — R92.2 INCONCLUSIVE MAMMOGRAM: ICD-10-CM

## 2023-01-20 ENCOUNTER — RESULT REVIEW (OUTPATIENT)
Age: 68
End: 2023-01-20

## 2023-01-20 ENCOUNTER — APPOINTMENT (OUTPATIENT)
Dept: HEMATOLOGY ONCOLOGY | Facility: CLINIC | Age: 68
End: 2023-01-20
Payer: MEDICARE

## 2023-01-20 ENCOUNTER — APPOINTMENT (OUTPATIENT)
Age: 68
End: 2023-01-20

## 2023-01-20 VITALS
SYSTOLIC BLOOD PRESSURE: 160 MMHG | DIASTOLIC BLOOD PRESSURE: 75 MMHG | OXYGEN SATURATION: 98 % | BODY MASS INDEX: 26.4 KG/M2 | WEIGHT: 145.31 LBS | HEART RATE: 72 BPM | HEIGHT: 62.4 IN

## 2023-01-20 LAB
BASOPHILS # BLD AUTO: 0 K/UL — SIGNIFICANT CHANGE UP (ref 0–0.2)
BASOPHILS NFR BLD AUTO: 1 % — SIGNIFICANT CHANGE UP (ref 0–2)
EOSINOPHIL # BLD AUTO: 0.1 K/UL — SIGNIFICANT CHANGE UP (ref 0–0.5)
EOSINOPHIL NFR BLD AUTO: 1.7 % — SIGNIFICANT CHANGE UP (ref 0–6)
HCT VFR BLD CALC: 34.1 % — LOW (ref 34.5–45)
HGB BLD-MCNC: 12.5 G/DL — SIGNIFICANT CHANGE UP (ref 11.5–15.5)
LYMPHOCYTES # BLD AUTO: 1.1 K/UL — SIGNIFICANT CHANGE UP (ref 1–3.3)
LYMPHOCYTES # BLD AUTO: 26.9 % — SIGNIFICANT CHANGE UP (ref 13–44)
MCHC RBC-ENTMCNC: 36.5 G/DL — HIGH (ref 32–36)
MCHC RBC-ENTMCNC: 37.5 PG — HIGH (ref 27–34)
MCV RBC AUTO: 102.8 FL — HIGH (ref 80–100)
MONOCYTES # BLD AUTO: 0.5 K/UL — SIGNIFICANT CHANGE UP (ref 0–0.9)
MONOCYTES NFR BLD AUTO: 11.5 % — SIGNIFICANT CHANGE UP (ref 2–14)
NEUTROPHILS # BLD AUTO: 2.5 K/UL — SIGNIFICANT CHANGE UP (ref 1.8–7.4)
NEUTROPHILS NFR BLD AUTO: 58.9 % — SIGNIFICANT CHANGE UP (ref 43–77)
PLATELET # BLD AUTO: 172 K/UL — SIGNIFICANT CHANGE UP (ref 150–400)
RBC # BLD: 3.32 M/UL — LOW (ref 3.8–5.2)
RBC # FLD: 13.3 % — SIGNIFICANT CHANGE UP (ref 10.3–14.5)
WBC # BLD: 4.3 K/UL — SIGNIFICANT CHANGE UP (ref 3.8–10.5)
WBC # FLD AUTO: 4.3 K/UL — SIGNIFICANT CHANGE UP (ref 3.8–10.5)

## 2023-01-20 PROCEDURE — 99214 OFFICE O/P EST MOD 30 MIN: CPT

## 2023-01-21 LAB
ALBUMIN SERPL ELPH-MCNC: 4.1 G/DL — SIGNIFICANT CHANGE UP (ref 3.3–5)
ALP SERPL-CCNC: 59 U/L — SIGNIFICANT CHANGE UP (ref 40–120)
ALT FLD-CCNC: 13 U/L — SIGNIFICANT CHANGE UP (ref 10–45)
ANION GAP SERPL CALC-SCNC: 11 MMOL/L — SIGNIFICANT CHANGE UP (ref 5–17)
APPEARANCE UR: CLEAR — SIGNIFICANT CHANGE UP
AST SERPL-CCNC: 21 U/L — SIGNIFICANT CHANGE UP (ref 10–40)
BILIRUB SERPL-MCNC: 0.4 MG/DL — SIGNIFICANT CHANGE UP (ref 0.2–1.2)
BILIRUB UR-MCNC: NEGATIVE — SIGNIFICANT CHANGE UP
BUN SERPL-MCNC: 18 MG/DL — SIGNIFICANT CHANGE UP (ref 7–23)
CALCIUM SERPL-MCNC: 9.3 MG/DL — SIGNIFICANT CHANGE UP (ref 8.4–10.5)
CHLORIDE SERPL-SCNC: 102 MMOL/L — SIGNIFICANT CHANGE UP (ref 96–108)
CO2 SERPL-SCNC: 25 MMOL/L — SIGNIFICANT CHANGE UP (ref 22–31)
COLOR SPEC: COLORLESS — SIGNIFICANT CHANGE UP
CREAT SERPL-MCNC: 0.8 MG/DL — SIGNIFICANT CHANGE UP (ref 0.5–1.3)
DIFF PNL FLD: NEGATIVE — SIGNIFICANT CHANGE UP
EGFR: 81 ML/MIN/1.73M2 — SIGNIFICANT CHANGE UP
GLUCOSE SERPL-MCNC: 115 MG/DL — HIGH (ref 70–99)
GLUCOSE UR QL: NEGATIVE — SIGNIFICANT CHANGE UP
KETONES UR-MCNC: NEGATIVE — SIGNIFICANT CHANGE UP
LEUKOCYTE ESTERASE UR-ACNC: NEGATIVE — SIGNIFICANT CHANGE UP
NITRITE UR-MCNC: NEGATIVE — SIGNIFICANT CHANGE UP
PH UR: 6.5 — SIGNIFICANT CHANGE UP (ref 5–8)
POTASSIUM SERPL-MCNC: 4.2 MMOL/L — SIGNIFICANT CHANGE UP (ref 3.5–5.3)
POTASSIUM SERPL-SCNC: 4.2 MMOL/L — SIGNIFICANT CHANGE UP (ref 3.5–5.3)
PROT SERPL-MCNC: 6.5 G/DL — SIGNIFICANT CHANGE UP (ref 6–8.3)
PROT UR-MCNC: NEGATIVE — SIGNIFICANT CHANGE UP
SODIUM SERPL-SCNC: 138 MMOL/L — SIGNIFICANT CHANGE UP (ref 135–145)
SP GR SPEC: 1.01 — LOW (ref 1.01–1.02)
UROBILINOGEN FLD QL: SIGNIFICANT CHANGE UP

## 2023-01-25 ENCOUNTER — TRANSCRIPTION ENCOUNTER (OUTPATIENT)
Age: 68
End: 2023-01-25

## 2023-01-26 ENCOUNTER — TRANSCRIPTION ENCOUNTER (OUTPATIENT)
Age: 68
End: 2023-01-26

## 2023-01-26 NOTE — PHYSICAL EXAM
[Fully active, able to carry on all pre-disease performance without restriction] : Status 0 - Fully active, able to carry on all pre-disease performance without restriction [Thin] : thin [Normal] : affect appropriate [de-identified] : Patient has bilateral hearing loss

## 2023-01-26 NOTE — HISTORY OF PRESENT ILLNESS
[de-identified] : 65 yo F with h/o HTN, hyperlipidemia, GERD, decreased hearing (present since childhood) and depression who was diagnosed with serous carcinoma found in a right side omental nodule in February 2022.\par \par She saw her GYN, Dr. Melissa Jaimes, for her annual exam in November 2021.  A transvaginal U/S showed abnormal endometrial thickening and uterine fibroids. CT A/P performed on 1/26/22 revealed multiple lobular soft tissue masses involving the omentum most pronounced on the R. side adjacent to ascending colon and cecum. Additionally nodularity adjacent to tip of cecum involving peritoneum. 2.9 cm left hemipelvic cyst suspicious for ovarian neoplasia. 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 Patient was negative for BRCA.\par \par She was planned for exploratory laparotomy and excision of the mass with Dr. Barrie Avelar but had a second opinion with Dr. Flores who recommended further work up of the mass with an IR biopsy.  Biopsy done on 2/16/22 and results showed poorly differentiated carcinoma consistent with serous carcinoma. Immunohistochemical stains for CK-7, CK-20, PAX 8, P53, WT1 and P16 were performed on block 1A at [facility] and interpreted at [facility] as follows:  \par CK-7, PAX 8, P16, P53, WT1: Positive.  CK-20: Negative\par \par The patient was started on carbo/taxol/estuardo. 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 The patient underwent interval, optimal debulking surgery on 6/6/2022 after 3 cycles of neoadjuvant chemotherapy. She completed an additional 3 cycloes of chemotherapy and was started on bevacizumab maintenance. Olaparib was added.  [de-identified] : Patient presents on daily Lynparza and Q3 week Marce for high grade serous primary peritoneal carcinomatosis s/p ex-lap supracervical hysterectomy and omentectomy with Dr. Jone Flores on 6/6/22.\par + Arthralgia/myalgia, LEs from hips to feet, slightly worse. + Fatigue, slightly worse. + Mucositis, very intermittent and less severe. + Decreased appetite, unchanged.  + Non-productive intermittent cough with sore "scratchy" throat, begins with taking Lynparza and last ~1-2 hours. + Dysgeusia, slowly improving. + Dyspepsia, intermittent, food related. + Nail changes, unchanged. + Baseline xeroderma, no worse. No N/V/D, epistaxis, fever or SOB.\par \par \par

## 2023-01-26 NOTE — ASSESSMENT
[FreeTextEntry1] : # Primary Peritoneal Cancer, high grade serous, stage III, HRD positive \par - s/p 3 cycles of neoadjuvant chemotherapy with carbo/taxol/marce. Marce held at cycle 3 in anticipation of surgery. \par - s/p interval debulking surgery on 6/6/2022. Patient had optimal debulking surgery. \par - then s/p additional 3 cycles of carbo/taxol, then started on maintenance bevacizumab. \par - Patient started on olaparib, initially at dose reduction. Patient was tolerating well and dose increased to 300 mg BID. But patient then had mucositis and decreased appetite with significant weight loss so dose was reduced back to 200mg BID.  Patient tolerating this dose well. \par - CBC has been stable, no anemia, thrombocytopenia or neutropenia. \par - RTC Q3 weeks for Marce with MD/PA follow up Q6 weeks  \par

## 2023-01-26 NOTE — RESULTS/DATA
[FreeTextEntry1] : 6/6/22 Debulking surgery\par Final Diagnosis\par \par 1. Omentum, partial Omentectomy ( 12 cm segment)\par \par - Omentum with adhesions and chronic inflammation\par - No atypia or malignancy identified\par \par 2. Uterus, corpus , hysterectomy\par - 35 Gram uterine corpus\par - Serosa: Adhesions and chronic inflammation\par - Cervix: Not seen, supra cervical specimen\par - Endometrium: Weakly proliferative with cystic atrophy. Endometrial polyp\par ( 1.0 cm)\par Negative for hyperplasia\par - Myometrium: Leiomyomas. Adenomyosis.\par - Negative for atypia or malignancy\par \par 3. Gastro colic ligament, excision\par - Fibrofatty tissue with scar, chronic inflammation, foreign body\par granuloma reaction\par - Negative for atypia or malignancy\par \par 5/5/22 Chest/ab/pelvis\par IMPRESSION:\par No gross evidence for carcinomatosis. 2.3 x 1.2 cm fluid density in the expected location of left adnexa this patient status post bilateral salpingo-oophorectomy. This may represent a small seroma or lymphocele. Minimal hazy soft tissue stranding in the supra colic omentum at site of previously visualized omental implant.\par \par 3/5/22 Chest CT \par IMPRESSION:\par Stable 3 mm right middle lobe nodule.\par \par Slightly larger right cardiophrenic lymphadenopathy. Stable left supradiaphragmatic lymph nodes.\par \par Partially visualized peritoneal nodularity in the left upper quadrant.\par \par \par Pathology 2/25/22 \par Accession:                             [id number]\par \par Collected Date/Time:                   2/25/2022 12:15 EST\par Received Date/Time:                    2/25/2022 12:15 EST\par \par Surgical Pathology Consultation Report - Auth (Verified)\par \par Specimen(s) Submitted\par \par 1. Slide Consult- Right omental nodule biopsy (11 slides, 27-D-,\par 1A1, PAX8, PAX8, CK7, CK7, WT1, P16, P53, WT1, P53)\par \par Final Diagnosis\par Submitting Institution:  [facility]\par \par \par Date of Procedure:  2/16/22\par \par Right omental nodule biopsy\par - Compatible with high grade serous carcinoma of adnexal or peritoneal\par origin, see note\par \par Note: In submitted immunostains, tumor cells are positive for CK7, PAX8,\par WT-1 and p16(diffuse). Tumor cells show aberrant expression(over -\par expression) for p53.\par \par 1/26/22 CT abdomen/pelvis \par multi lobular soft tissue masses involving the omentum most pronounced right side adjacent to the ascending colon and cecum. Additional nodularity adjacent to the tip of the cecum involving the peritoneum. 2.9 cm left hemipelvic cyst. Findings suspicious for ovarian neoplasia. \par

## 2023-02-02 ENCOUNTER — APPOINTMENT (OUTPATIENT)
Dept: GYNECOLOGIC ONCOLOGY | Facility: CLINIC | Age: 68
End: 2023-02-02
Payer: MEDICARE

## 2023-02-02 VITALS
DIASTOLIC BLOOD PRESSURE: 70 MMHG | HEIGHT: 62 IN | HEART RATE: 71 BPM | OXYGEN SATURATION: 98 % | SYSTOLIC BLOOD PRESSURE: 145 MMHG | BODY MASS INDEX: 25.76 KG/M2 | WEIGHT: 140 LBS

## 2023-02-02 PROCEDURE — 99213 OFFICE O/P EST LOW 20 MIN: CPT

## 2023-02-03 ENCOUNTER — APPOINTMENT (OUTPATIENT)
Dept: PHYSICAL MEDICINE AND REHAB | Facility: CLINIC | Age: 68
End: 2023-02-03
Payer: MEDICARE

## 2023-02-03 VITALS
DIASTOLIC BLOOD PRESSURE: 74 MMHG | BODY MASS INDEX: 25.76 KG/M2 | OXYGEN SATURATION: 99 % | SYSTOLIC BLOOD PRESSURE: 152 MMHG | HEART RATE: 71 BPM | HEIGHT: 62 IN | WEIGHT: 140 LBS

## 2023-02-03 DIAGNOSIS — R60.9 EDEMA, UNSPECIFIED: ICD-10-CM

## 2023-02-03 PROCEDURE — 99204 OFFICE O/P NEW MOD 45 MIN: CPT

## 2023-02-03 RX ORDER — GABAPENTIN 100 MG/1
100 CAPSULE ORAL 3 TIMES DAILY
Qty: 90 | Refills: 1 | Status: ACTIVE | COMMUNITY
Start: 2023-02-03 | End: 1900-01-01

## 2023-02-03 NOTE — HISTORY OF PRESENT ILLNESS
[FreeTextEntry1] : Ms. Retana is a 67 year old female with Primary Peritoneal Cancer, high grade serous, stage III, HRD positive.  S/p 3 cycles of neoadjuvant chemotherapy with carbo/taxol/estuardo.  S/p interval debulking surgery on 6/6/2022.  Then s/p additional 3 cycles of carbo/taxol, then started on maintenance bevacizumab.  Patient started on olaparib.\par \par She reports since the time of her treatment she has felt that she has had a burning sensation in her bilateral legs she also feels like there is a cramping sensation.  She also reports worsening of joint pains in her bilateral knees.  States she is also had difficulty with ambulation.  Denies any overt weakness but does feel generalized weakness.\par

## 2023-02-03 NOTE — ASSESSMENT
[FreeTextEntry1] : 67 year old female presenting for evaluation.\par \par #Neuropathy:\par -Start gabapentin 100mg TID\par -Discussed risks and benefits of medical cannabis will defer for now\par -Obtain US b/l LE given symptoms of cramping to rule out DVT, although likely secondary to neuropathy symptoms\par \par #Arthralgias:\par -Worst in b/l knees\par -Obtain x-rays to evaluate b/l knees\par -Start Voltaren gel\par -Neoprene Bracing prn for comfort \par \par #Impaired balance:\par -Start PT for gait and balance training, orthotics evaluation \par \par Follow up in 1 month.

## 2023-02-03 NOTE — PHYSICAL EXAM
[FreeTextEntry1] : Gen: Patient is A&O x 3, NAD\par HEENT: EOMI, hearing grossly normal\par Resp: regular, non - labored\par CV: pulses regular\par Skin: no rashes, erythema\par Lymph: no clubbing, cyanosis, edema\par Inspection: no instability \par ROM: full throughout\par Palpation: TTP bilateral knee joints \par Sensation: mild creased in LE to light touch \par Reflexes: 1+ and symmetric throughout\par Strength: 5/5 throughout\par Special tests: -straight leg raise, no clonus \par Gait: antalgic, unable to stand tandem, small step length \par \par

## 2023-02-06 ENCOUNTER — NON-APPOINTMENT (OUTPATIENT)
Age: 68
End: 2023-02-06

## 2023-02-06 ENCOUNTER — APPOINTMENT (OUTPATIENT)
Dept: FAMILY MEDICINE | Facility: CLINIC | Age: 68
End: 2023-02-06
Payer: MEDICARE

## 2023-02-06 VITALS
BODY MASS INDEX: 26.5 KG/M2 | HEIGHT: 62 IN | HEART RATE: 80 BPM | OXYGEN SATURATION: 99 % | WEIGHT: 144 LBS | SYSTOLIC BLOOD PRESSURE: 132 MMHG | DIASTOLIC BLOOD PRESSURE: 64 MMHG

## 2023-02-06 DIAGNOSIS — Z01.818 ENCOUNTER FOR OTHER PREPROCEDURAL EXAMINATION: ICD-10-CM

## 2023-02-06 DIAGNOSIS — R19.00 INTRA-ABDOMINAL AND PELVIC SWELLING, MASS AND LUMP, UNSPECIFIED SITE: ICD-10-CM

## 2023-02-06 DIAGNOSIS — R73.01 IMPAIRED FASTING GLUCOSE: ICD-10-CM

## 2023-02-06 DIAGNOSIS — Z00.00 ENCOUNTER FOR GENERAL ADULT MEDICAL EXAMINATION W/OUT ABNORMAL FINDINGS: ICD-10-CM

## 2023-02-06 DIAGNOSIS — F32.A DEPRESSION, UNSPECIFIED: ICD-10-CM

## 2023-02-06 DIAGNOSIS — I10 ESSENTIAL (PRIMARY) HYPERTENSION: ICD-10-CM

## 2023-02-06 PROCEDURE — 99214 OFFICE O/P EST MOD 30 MIN: CPT

## 2023-02-09 PROBLEM — F32.A DEPRESSION: Status: ACTIVE | Noted: 2018-02-01

## 2023-02-09 PROBLEM — Z01.818 PREOPERATIVE CLEARANCE: Status: RESOLVED | Noted: 2018-03-09 | Resolved: 2023-02-09

## 2023-02-09 PROBLEM — R19.00 ABDOMINAL MASS: Status: RESOLVED | Noted: 2022-02-09 | Resolved: 2023-02-09

## 2023-02-09 PROBLEM — I10 BENIGN ESSENTIAL HYPERTENSION: Status: ACTIVE | Noted: 2018-02-15

## 2023-02-09 LAB
CHOLEST SERPL-MCNC: 202 MG/DL
ESTIMATED AVERAGE GLUCOSE: 100 MG/DL
HBA1C MFR BLD HPLC: 5.1 %
HDLC SERPL-MCNC: 54 MG/DL
LDLC SERPL CALC-MCNC: 133 MG/DL
NONHDLC SERPL-MCNC: 148 MG/DL
TRIGL SERPL-MCNC: 78 MG/DL

## 2023-02-09 RX ORDER — ONDANSETRON 8 MG/1
8 TABLET, ORALLY DISINTEGRATING ORAL EVERY 8 HOURS
Qty: 90 | Refills: 3 | Status: DISCONTINUED | COMMUNITY
Start: 2022-02-28 | End: 2023-02-09

## 2023-02-09 RX ORDER — METRONIDAZOLE 500 MG/1
500 TABLET ORAL
Qty: 3 | Refills: 0 | Status: DISCONTINUED | COMMUNITY
Start: 2022-05-11 | End: 2023-02-09

## 2023-02-09 RX ORDER — NIRMATRELVIR AND RITONAVIR 300-100 MG
20 X 150 MG & KIT ORAL
Qty: 30 | Refills: 0 | Status: DISCONTINUED | COMMUNITY
Start: 2022-05-18 | End: 2023-02-09

## 2023-02-09 RX ORDER — IBUPROFEN 600 MG/1
600 TABLET, FILM COATED ORAL 3 TIMES DAILY
Qty: 1 | Refills: 1 | Status: DISCONTINUED | COMMUNITY
Start: 2019-04-05 | End: 2023-02-09

## 2023-02-09 RX ORDER — PROCHLORPERAZINE MALEATE 10 MG/1
10 TABLET ORAL EVERY 6 HOURS
Qty: 120 | Refills: 5 | Status: DISCONTINUED | COMMUNITY
Start: 2022-02-28 | End: 2023-02-09

## 2023-02-09 RX ORDER — NEOMYCIN SULFATE 500 MG/1
500 TABLET ORAL
Qty: 6 | Refills: 0 | Status: DISCONTINUED | COMMUNITY
Start: 2022-05-11 | End: 2023-02-09

## 2023-02-09 NOTE — PHYSICAL EXAM
[No Acute Distress] : no acute distress [No Joint Swelling] : no joint swelling [de-identified] : calm and engaging   turban   [de-identified] : hearing aides  [de-identified] : TTP b/l knees     strength intact   WALKS WITH CANE.

## 2023-02-09 NOTE — HEALTH RISK ASSESSMENT
[No falls in past year] : Patient reported no falls in the past year [Assistive Device] : Patient uses an assistive device [de-identified] : cane

## 2023-02-09 NOTE — ASSESSMENT
[FreeTextEntry1] : FUV for 67 year old WF with PMH as stated in HPI / active list. \par \par Management : \par \par See HPI and Plan.\par \par Advised to call 3 days prior to when refills required. \par \par Labs in office today, will advise.  A 1 C and lipid panel.  \par \par Continue to follow up with appropriate specialists. \par \par Consider PT. \par \par Continue current medication regimens. Continue healthy lifestyle choices! \par \par Best wishes offered!

## 2023-02-09 NOTE — REVIEW OF SYSTEMS
[Joint Pain] : joint pain [Hearing Loss] : no hearing loss [FreeTextEntry4] : reports no changes in hearing, continues to wear hearing aides [FreeTextEntry9] : greatly improved!  [de-identified] : greatly improved!

## 2023-02-09 NOTE — HISTORY OF PRESENT ILLNESS
[FreeTextEntry1] : Last seen in office May 2022 for pre-op clearance regarding hysterectomy, encounter reviewed. \par \par  Extensive chart reviewed  and noted/appreciated for: Per GYN ONC note of 2/2/23 \par \par  66 y/o patient with Stage III High Grade Serous PPC, s/p Ex-Lap Supracervical hysterectomy, omentectomy at [facility] on 6/6/22 with Dr. Jone Flores. She is currently undergoing maintenance treatment with Dr. Garces, s/p completion of systemic therapy with Carbo/Taxol on 8/25/22. Patient is currently on maintenance Bevacizumab and Lynparza. She returns to the office today for a routine svl visit. \par \par Ca125 12/9/22 was 10\par CT A/P (9/23/22) - No evidence of recurrence.\par \par Health Maintenance:\par Mammo (11/21/22) - BI-RADS 2, benign\par Colonoscopy/EDG (5/2018) - Normal colonoscopy return PRN; repeat EDG in 3 years due to polyps (one w/ minimal reactive gastropathy) \par \par RECENT CONSULT PM&R f or burning sensation in her Legs B/L which feels like cramping as well as joint pains in both knees impairing her gait stability;   Walks WITH CANE . \par PM&R (initial evaluation, Leg pain, impaired balance,     February 2023)   \par GynOnc (Primary peritoneal carcinomatosis f/u,     February 2023)     \par HemeOnc (Primary peritoneal carcinomatosis,     January 2023) [de-identified] : \par In recent weeks ANYA endorses:\par  \par Pt states that she feels "ok."  Pt states that gabapentin 100 mg has GREATLY  improved her overall condition in regards to neuropathic pain.  Was a major concern and stressor in her life "so happy" now.  Pt states b/l neuropathic pain and paraesthesias "I would be dragging my feet around by the end of the day."  Bowel habits have improved, now more regular.  Continues to ambulate with a cane.  \par \par Mouth sores "terrible, I was spitting out the entire inside of my mouth."  Pt states condition has improved and continues to use medications as needed regarding.  \par \par Pt states she plans on cutting back on work, "I don't want to work 5 days a week anymore."  States lab is also changing locations, states that is playing a part in her decision.  \par \par Pt states family is well.   continues to provide care to her as well as his aging mother who lives on [address]. \par

## 2023-02-09 NOTE — ADDENDUM
[FreeTextEntry1] : Medical record entries made by the scribe today, were at my direction and personally dictated to them by me, Dr. Neli Nash on 02/06/2023.  I have reviewed the chart and agree that the record accurately reflects my personal performance of the history, physical exam, assessment and plan.  I, Pato Alexandra acting as scribe for Dr. Neli Nash MD on 02/06/2023 at 4:17 PM.

## 2023-02-10 ENCOUNTER — RESULT REVIEW (OUTPATIENT)
Age: 68
End: 2023-02-10

## 2023-02-10 ENCOUNTER — OUTPATIENT (OUTPATIENT)
Dept: OUTPATIENT SERVICES | Facility: HOSPITAL | Age: 68
LOS: 1 days | End: 2023-02-10

## 2023-02-10 ENCOUNTER — APPOINTMENT (OUTPATIENT)
Age: 68
End: 2023-02-10

## 2023-02-10 ENCOUNTER — APPOINTMENT (OUTPATIENT)
Dept: ULTRASOUND IMAGING | Facility: CLINIC | Age: 68
End: 2023-02-10
Payer: MEDICARE

## 2023-02-10 DIAGNOSIS — Z98.890 OTHER SPECIFIED POSTPROCEDURAL STATES: Chronic | ICD-10-CM

## 2023-02-10 DIAGNOSIS — Z90.722 ACQUIRED ABSENCE OF OVARIES, BILATERAL: Chronic | ICD-10-CM

## 2023-02-10 DIAGNOSIS — Z96.21 COCHLEAR IMPLANT STATUS: Chronic | ICD-10-CM

## 2023-02-10 DIAGNOSIS — Z98.51 TUBAL LIGATION STATUS: Chronic | ICD-10-CM

## 2023-02-10 DIAGNOSIS — Z00.8 ENCOUNTER FOR OTHER GENERAL EXAMINATION: ICD-10-CM

## 2023-02-10 LAB
ALBUMIN SERPL ELPH-MCNC: 4 G/DL — SIGNIFICANT CHANGE UP (ref 3.3–5)
ALP SERPL-CCNC: 61 U/L — SIGNIFICANT CHANGE UP (ref 40–120)
ALT FLD-CCNC: 17 U/L — SIGNIFICANT CHANGE UP (ref 10–45)
ANION GAP SERPL CALC-SCNC: 12 MMOL/L — SIGNIFICANT CHANGE UP (ref 5–17)
APPEARANCE UR: CLEAR — SIGNIFICANT CHANGE UP
AST SERPL-CCNC: 25 U/L — SIGNIFICANT CHANGE UP (ref 10–40)
BASOPHILS # BLD AUTO: 0.1 K/UL — SIGNIFICANT CHANGE UP (ref 0–0.2)
BASOPHILS NFR BLD AUTO: 1.5 % — SIGNIFICANT CHANGE UP (ref 0–2)
BILIRUB SERPL-MCNC: 0.3 MG/DL — SIGNIFICANT CHANGE UP (ref 0.2–1.2)
BILIRUB UR-MCNC: NEGATIVE — SIGNIFICANT CHANGE UP
BUN SERPL-MCNC: 17 MG/DL — SIGNIFICANT CHANGE UP (ref 7–23)
CALCIUM SERPL-MCNC: 9.1 MG/DL — SIGNIFICANT CHANGE UP (ref 8.4–10.5)
CHLORIDE SERPL-SCNC: 104 MMOL/L — SIGNIFICANT CHANGE UP (ref 96–108)
CO2 SERPL-SCNC: 23 MMOL/L — SIGNIFICANT CHANGE UP (ref 22–31)
COLOR SPEC: YELLOW — SIGNIFICANT CHANGE UP
CREAT SERPL-MCNC: 0.65 MG/DL — SIGNIFICANT CHANGE UP (ref 0.5–1.3)
DIFF PNL FLD: NEGATIVE — SIGNIFICANT CHANGE UP
EGFR: 97 ML/MIN/1.73M2 — SIGNIFICANT CHANGE UP
EOSINOPHIL # BLD AUTO: 0.1 K/UL — SIGNIFICANT CHANGE UP (ref 0–0.5)
EOSINOPHIL NFR BLD AUTO: 1.9 % — SIGNIFICANT CHANGE UP (ref 0–6)
GLUCOSE SERPL-MCNC: 113 MG/DL — HIGH (ref 70–99)
GLUCOSE UR QL: NEGATIVE — SIGNIFICANT CHANGE UP
HCT VFR BLD CALC: 35.4 % — SIGNIFICANT CHANGE UP (ref 34.5–45)
HGB BLD-MCNC: 12.2 G/DL — SIGNIFICANT CHANGE UP (ref 11.5–15.5)
KETONES UR-MCNC: NEGATIVE — SIGNIFICANT CHANGE UP
LEUKOCYTE ESTERASE UR-ACNC: NEGATIVE — SIGNIFICANT CHANGE UP
LYMPHOCYTES # BLD AUTO: 0.7 K/UL — LOW (ref 1–3.3)
LYMPHOCYTES # BLD AUTO: 16.9 % — SIGNIFICANT CHANGE UP (ref 13–44)
MAGNESIUM SERPL-MCNC: 1.8 MG/DL — SIGNIFICANT CHANGE UP (ref 1.6–2.6)
MCHC RBC-ENTMCNC: 34.5 G/DL — SIGNIFICANT CHANGE UP (ref 32–36)
MCHC RBC-ENTMCNC: 36.1 PG — HIGH (ref 27–34)
MCV RBC AUTO: 104.8 FL — HIGH (ref 80–100)
MONOCYTES # BLD AUTO: 0.5 K/UL — SIGNIFICANT CHANGE UP (ref 0–0.9)
MONOCYTES NFR BLD AUTO: 12.8 % — SIGNIFICANT CHANGE UP (ref 2–14)
NEUTROPHILS # BLD AUTO: 2.8 K/UL — SIGNIFICANT CHANGE UP (ref 1.8–7.4)
NEUTROPHILS NFR BLD AUTO: 66.8 % — SIGNIFICANT CHANGE UP (ref 43–77)
NITRITE UR-MCNC: NEGATIVE — SIGNIFICANT CHANGE UP
PH UR: 6.5 — SIGNIFICANT CHANGE UP (ref 5–8)
PLATELET # BLD AUTO: 156 K/UL — SIGNIFICANT CHANGE UP (ref 150–400)
POTASSIUM SERPL-MCNC: 4.5 MMOL/L — SIGNIFICANT CHANGE UP (ref 3.5–5.3)
POTASSIUM SERPL-SCNC: 4.5 MMOL/L — SIGNIFICANT CHANGE UP (ref 3.5–5.3)
PROT SERPL-MCNC: 6.6 G/DL — SIGNIFICANT CHANGE UP (ref 6–8.3)
PROT UR-MCNC: SIGNIFICANT CHANGE UP
RBC # BLD: 3.38 M/UL — LOW (ref 3.8–5.2)
RBC # FLD: 13 % — SIGNIFICANT CHANGE UP (ref 10.3–14.5)
SODIUM SERPL-SCNC: 139 MMOL/L — SIGNIFICANT CHANGE UP (ref 135–145)
SP GR SPEC: 1.02 — SIGNIFICANT CHANGE UP (ref 1.01–1.02)
UROBILINOGEN FLD QL: SIGNIFICANT CHANGE UP
WBC # BLD: 4.1 K/UL — SIGNIFICANT CHANGE UP (ref 3.8–10.5)
WBC # FLD AUTO: 4.1 K/UL — SIGNIFICANT CHANGE UP (ref 3.8–10.5)

## 2023-02-10 PROCEDURE — 73562 X-RAY EXAM OF KNEE 3: CPT | Mod: 26,50

## 2023-02-10 PROCEDURE — 93970 EXTREMITY STUDY: CPT | Mod: 26

## 2023-02-11 LAB — CANCER AG125 SERPL-ACNC: 8 U/ML — SIGNIFICANT CHANGE UP

## 2023-02-13 ENCOUNTER — OFFICE (OUTPATIENT)
Dept: URBAN - METROPOLITAN AREA CLINIC 115 | Facility: CLINIC | Age: 68
Setting detail: OPHTHALMOLOGY
End: 2023-02-13
Payer: COMMERCIAL

## 2023-02-13 VITALS — HEIGHT: 55 IN

## 2023-02-13 DIAGNOSIS — H43.811: ICD-10-CM

## 2023-02-13 DIAGNOSIS — H52.13: ICD-10-CM

## 2023-02-13 DIAGNOSIS — H52.4: ICD-10-CM

## 2023-02-13 DIAGNOSIS — H25.13: ICD-10-CM

## 2023-02-13 PROBLEM — H52.03 HYPEROPIA; BOTH EYES: Status: ACTIVE | Noted: 2023-02-13

## 2023-02-13 PROCEDURE — 92015 DETERMINE REFRACTIVE STATE: CPT | Performed by: OPHTHALMOLOGY

## 2023-02-13 PROCEDURE — 92014 COMPRE OPH EXAM EST PT 1/>: CPT | Performed by: OPHTHALMOLOGY

## 2023-02-13 ASSESSMENT — REFRACTION_MANIFEST
OD_SPHERE: PLANO
OS_AXIS: 075
OS_ADD: +2.50
OS_AXIS: 076
OS_SPHERE: -0.25
OS_SPHERE: -0.25
OS_CYLINDER: -1.50
OS_VA1: 20/30
OD_SPHERE: -0.25
OS_ADD: +2.50
OS_CYLINDER: -1.25
OS_VA1: 20/20
OD_VA1: 20/20
OD_AXIS: 137
OD_ADD: +2.50
OD_ADD: +2.50
OD_VA1: 20/20
OU_VA: 20/20
OD_CYLINDER: -0.25

## 2023-02-13 ASSESSMENT — SPHEQUIV_DERIVED
OD_SPHEQUIV: -0.25
OS_SPHEQUIV: -0.875
OS_SPHEQUIV: -1
OS_SPHEQUIV: -0.75

## 2023-02-13 ASSESSMENT — TONOMETRY
OS_IOP_MMHG: 14
OD_IOP_MMHG: 11

## 2023-02-13 ASSESSMENT — REFRACTION_CURRENTRX
OS_ADD: +2.75
OD_CYLINDER: 0.00
OD_AXIS: 180
OD_VPRISM_DIRECTION: BF
OS_CYLINDER: -1.50
OD_SPHERE: -0.25
OD_ADD: +2.75
OS_SPHERE: -0.25
OS_OVR_VA: 20/
OD_OVR_VA: 20/
OS_AXIS: 080
OS_VPRISM_DIRECTION: BF

## 2023-02-13 ASSESSMENT — REFRACTION_AUTOREFRACTION
OD_AXIS: 137
OS_SPHERE: 0.00
OS_AXIS: 076
OS_CYLINDER: -1.50
OD_CYLINDER: -0.50
OD_SPHERE: 0.00

## 2023-02-13 ASSESSMENT — VISUAL ACUITY
OS_BCVA: 20/25-1
OD_BCVA: 20/30

## 2023-02-13 ASSESSMENT — CONFRONTATIONAL VISUAL FIELD TEST (CVF)
OD_FINDINGS: FULL
OS_FINDINGS: FULL

## 2023-02-13 NOTE — ASSESSMENT
[FreeTextEntry1] : Patient denies any pain or VB. She reports neuropathy, worsening since completing chemo on Lynparza and SEN. Patient has to be very careful when walking and going up and down steps. Uses a cane. neuropathy of legs \par Handicap pass patient reports not needing it because she only really goes to work. \par \par Clinically KHUSHI \par Follow up 3-4 months for routine svl \par \par \par \par

## 2023-02-13 NOTE — HISTORY OF PRESENT ILLNESS
[FreeTextEntry1] : 66 y/o patient with Stage III High Grade Serous PPC, s/p Ex-Lap Supracervical hysterectomy, omentectomy at [facility] on 6/6/22 with Dr. Jone Flores. She is currently undergoing maintenance treatment with Dr. Garces, s/p completion of systemic therapy with Carbo/Taxol on 8/25/22. Patient is currently on maintenance Bevacizumab and Lynparza. She returns to the office today for a routine svl visit. \par \par Ca125 12/9/22 was 10\par CT A/P (9/23/22) - No evidence of recurrence.\par \par Health Maintenance:\par Mammo (11/21/22) - BI-RADS 2, benign\par Colonoscopy/EDG (5/2018) - Normal colonoscopy return PRN; repeat EDG in 3 years due to polyps (one w/ minimal reactive gastropathy)

## 2023-02-13 NOTE — PHYSICAL EXAM
[Chaperone Present] : A chaperone was present in the examining room during all aspects of the physical examination [Absent] : Adnexa(ae): Absent [Normal] : Recto-Vaginal Exam: Normal [FreeTextEntry1] : Xuan Shearer Medical assistant chaperoned during gynecologic exam.  [de-identified] : Vertical midline scar

## 2023-02-13 NOTE — REASON FOR VISIT
[FreeTextEntry1] : [place] Location \par \par [facility] [place]. 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

## 2023-02-22 ENCOUNTER — OUTPATIENT (OUTPATIENT)
Dept: OUTPATIENT SERVICES | Facility: HOSPITAL | Age: 68
LOS: 1 days | Discharge: HOME | End: 2023-02-22

## 2023-02-22 ENCOUNTER — RESULT REVIEW (OUTPATIENT)
Age: 68
End: 2023-02-22

## 2023-02-22 DIAGNOSIS — C48.2 MALIGNANT NEOPLASM OF PERITONEUM, UNSPECIFIED: ICD-10-CM

## 2023-02-22 DIAGNOSIS — Z96.21 COCHLEAR IMPLANT STATUS: Chronic | ICD-10-CM

## 2023-02-22 DIAGNOSIS — Z90.722 ACQUIRED ABSENCE OF OVARIES, BILATERAL: Chronic | ICD-10-CM

## 2023-02-22 DIAGNOSIS — Z98.51 TUBAL LIGATION STATUS: Chronic | ICD-10-CM

## 2023-02-22 DIAGNOSIS — Z98.890 OTHER SPECIFIED POSTPROCEDURAL STATES: Chronic | ICD-10-CM

## 2023-03-03 ENCOUNTER — APPOINTMENT (OUTPATIENT)
Age: 68
End: 2023-03-03

## 2023-03-03 ENCOUNTER — RESULT REVIEW (OUTPATIENT)
Age: 68
End: 2023-03-03

## 2023-03-03 ENCOUNTER — APPOINTMENT (OUTPATIENT)
Dept: HEMATOLOGY ONCOLOGY | Facility: CLINIC | Age: 68
End: 2023-03-03
Payer: MEDICARE

## 2023-03-03 ENCOUNTER — APPOINTMENT (OUTPATIENT)
Dept: PHYSICAL MEDICINE AND REHAB | Facility: CLINIC | Age: 68
End: 2023-03-03
Payer: MEDICARE

## 2023-03-03 VITALS
OXYGEN SATURATION: 96 % | WEIGHT: 144 LBS | DIASTOLIC BLOOD PRESSURE: 77 MMHG | SYSTOLIC BLOOD PRESSURE: 159 MMHG | HEART RATE: 66 BPM | HEIGHT: 62 IN | BODY MASS INDEX: 26.5 KG/M2

## 2023-03-03 LAB
BASOPHILS # BLD AUTO: 0.1 K/UL — SIGNIFICANT CHANGE UP (ref 0–0.2)
BASOPHILS NFR BLD AUTO: 1.3 % — SIGNIFICANT CHANGE UP (ref 0–2)
EOSINOPHIL # BLD AUTO: 0.1 K/UL — SIGNIFICANT CHANGE UP (ref 0–0.5)
EOSINOPHIL NFR BLD AUTO: 1.7 % — SIGNIFICANT CHANGE UP (ref 0–6)
HCT VFR BLD CALC: 38.6 % — SIGNIFICANT CHANGE UP (ref 34.5–45)
HGB BLD-MCNC: 13.9 G/DL — SIGNIFICANT CHANGE UP (ref 11.5–15.5)
LYMPHOCYTES # BLD AUTO: 0.8 K/UL — LOW (ref 1–3.3)
LYMPHOCYTES # BLD AUTO: 19.9 % — SIGNIFICANT CHANGE UP (ref 13–44)
MCHC RBC-ENTMCNC: 36 G/DL — SIGNIFICANT CHANGE UP (ref 32–36)
MCHC RBC-ENTMCNC: 36.9 PG — HIGH (ref 27–34)
MCV RBC AUTO: 102.5 FL — HIGH (ref 80–100)
MONOCYTES # BLD AUTO: 0.5 K/UL — SIGNIFICANT CHANGE UP (ref 0–0.9)
MONOCYTES NFR BLD AUTO: 13.4 % — SIGNIFICANT CHANGE UP (ref 2–14)
NEUTROPHILS # BLD AUTO: 2.6 K/UL — SIGNIFICANT CHANGE UP (ref 1.8–7.4)
NEUTROPHILS NFR BLD AUTO: 63.6 % — SIGNIFICANT CHANGE UP (ref 43–77)
PLATELET # BLD AUTO: 138 K/UL — LOW (ref 150–400)
RBC # BLD: 3.76 M/UL — LOW (ref 3.8–5.2)
RBC # FLD: 13.3 % — SIGNIFICANT CHANGE UP (ref 10.3–14.5)
WBC # BLD: 4 K/UL — SIGNIFICANT CHANGE UP (ref 3.8–10.5)
WBC # FLD AUTO: 4 K/UL — SIGNIFICANT CHANGE UP (ref 3.8–10.5)

## 2023-03-03 PROCEDURE — 99214 OFFICE O/P EST MOD 30 MIN: CPT

## 2023-03-03 NOTE — DATA REVIEWED
[FreeTextEntry1] : Bilateral knee x-rays February 2023: No fractures dislocation or joint effusions.  Medial slightly greater than lateral tibiofemoral compartment osteoarthritic change.  No joint margin erosions.  Generalized osteopenia otherwise no discrete lytic or blastic lesions.\par \par Ultrasound lower extremity February 2023: No evidence of deep venous thrombosis in either lower extremity

## 2023-03-03 NOTE — HISTORY OF PRESENT ILLNESS
[FreeTextEntry1] : Ms. Retana is a 67 year old female with Primary Peritoneal Cancer, high grade serous, stage III, HRD positive.  S/p 3 cycles of neoadjuvant chemotherapy with carbo/taxol/estuardo.  S/p interval debulking surgery on 6/6/2022.  Then s/p additional 3 cycles of carbo/taxol, then started on maintenance bevacizumab.  Patient started on olaparib.\par \par Since her last visit she started gabapentin.  Reports significant improvement in her leg pain.  Reports she is ambulating better.  Denies any side effects to it.  Has not yet started physical therapy.  Reports general improvement in her knee pain only occasionally aching.

## 2023-03-03 NOTE — ASSESSMENT
[FreeTextEntry1] : 67 year old female presenting for evaluation.\par \par #Neuropathy:\par -Increase gabapentin gradually to 200mg TID\par -US reviewed, negative for DVT, cramping improved \par \par #Arthralgias:\par -Worst in b/l knees\par -x-ray b/l knees reviewed, mild OA\par -Continue Voltaren gel\par -Neoprene Bracing prn for comfort \par \par #Impaired balance:\par -Start PT for gait and balance training, orthotics evaluation \par \par Follow up in 2 months.

## 2023-03-03 NOTE — PHYSICAL EXAM
[FreeTextEntry1] : Gen: Patient is A&O x 3, NAD\par HEENT: EOMI, hearing grossly normal\par Resp: regular, non - labored\par CV: pulses regular\par Skin: no rashes, erythema\par Lymph: no clubbing, cyanosis, edema\par Inspection: no instability \par ROM: full throughout\par Palpation: TTP bilateral knee joints \par Sensation: mild decrease in LE to light touch \par Reflexes: 1+ and symmetric throughout\par Strength: 5/5 throughout\par Special tests: -straight leg raise, no clonus \par Gait: antalgic, wide gait \par \par

## 2023-03-04 LAB
ALBUMIN SERPL ELPH-MCNC: 3.9 G/DL — SIGNIFICANT CHANGE UP (ref 3.3–5)
ALP SERPL-CCNC: 60 U/L — SIGNIFICANT CHANGE UP (ref 40–120)
ALT FLD-CCNC: 16 U/L — SIGNIFICANT CHANGE UP (ref 10–45)
ANION GAP SERPL CALC-SCNC: 12 MMOL/L — SIGNIFICANT CHANGE UP (ref 5–17)
APPEARANCE UR: CLEAR — SIGNIFICANT CHANGE UP
AST SERPL-CCNC: 26 U/L — SIGNIFICANT CHANGE UP (ref 10–40)
BILIRUB SERPL-MCNC: 0.3 MG/DL — SIGNIFICANT CHANGE UP (ref 0.2–1.2)
BILIRUB UR-MCNC: NEGATIVE — SIGNIFICANT CHANGE UP
BUN SERPL-MCNC: 19 MG/DL — SIGNIFICANT CHANGE UP (ref 7–23)
CALCIUM SERPL-MCNC: 9.2 MG/DL — SIGNIFICANT CHANGE UP (ref 8.4–10.5)
CHLORIDE SERPL-SCNC: 105 MMOL/L — SIGNIFICANT CHANGE UP (ref 96–108)
CO2 SERPL-SCNC: 23 MMOL/L — SIGNIFICANT CHANGE UP (ref 22–31)
COLOR SPEC: YELLOW — SIGNIFICANT CHANGE UP
CREAT SERPL-MCNC: 0.65 MG/DL — SIGNIFICANT CHANGE UP (ref 0.5–1.3)
DIFF PNL FLD: NEGATIVE — SIGNIFICANT CHANGE UP
EGFR: 97 ML/MIN/1.73M2 — SIGNIFICANT CHANGE UP
GLUCOSE SERPL-MCNC: 119 MG/DL — HIGH (ref 70–99)
GLUCOSE UR QL: NEGATIVE — SIGNIFICANT CHANGE UP
KETONES UR-MCNC: NEGATIVE — SIGNIFICANT CHANGE UP
LEUKOCYTE ESTERASE UR-ACNC: NEGATIVE — SIGNIFICANT CHANGE UP
NITRITE UR-MCNC: NEGATIVE — SIGNIFICANT CHANGE UP
PH UR: 6 — SIGNIFICANT CHANGE UP (ref 5–8)
POTASSIUM SERPL-MCNC: 4.4 MMOL/L — SIGNIFICANT CHANGE UP (ref 3.5–5.3)
POTASSIUM SERPL-SCNC: 4.4 MMOL/L — SIGNIFICANT CHANGE UP (ref 3.5–5.3)
PROT SERPL-MCNC: 6.2 G/DL — SIGNIFICANT CHANGE UP (ref 6–8.3)
PROT UR-MCNC: SIGNIFICANT CHANGE UP
SODIUM SERPL-SCNC: 141 MMOL/L — SIGNIFICANT CHANGE UP (ref 135–145)
SP GR SPEC: 1.02 — SIGNIFICANT CHANGE UP (ref 1.01–1.02)
UROBILINOGEN FLD QL: SIGNIFICANT CHANGE UP

## 2023-03-06 DIAGNOSIS — Z51.11 ENCOUNTER FOR ANTINEOPLASTIC CHEMOTHERAPY: ICD-10-CM

## 2023-03-06 DIAGNOSIS — R11.2 NAUSEA WITH VOMITING, UNSPECIFIED: ICD-10-CM

## 2023-03-14 NOTE — PHYSICAL EXAM
[Fully active, able to carry on all pre-disease performance without restriction] : Status 0 - Fully active, able to carry on all pre-disease performance without restriction [Thin] : thin [Normal] : affect appropriate [de-identified] : Patient has bilateral hearing loss

## 2023-03-14 NOTE — HISTORY OF PRESENT ILLNESS
[de-identified] : 67 yo F with h/o HTN, hyperlipidemia, GERD, decreased hearing (present since childhood) and depression who was diagnosed with serous carcinoma found in a right side omental nodule in February 2022.\par \par She saw her GYN, Dr. Melissa Jaimes, for her annual exam in November 2021.  A transvaginal U/S showed abnormal endometrial thickening and uterine fibroids. CT A/P performed on 1/26/22 revealed multiple lobular soft tissue masses involving the omentum most pronounced on the R. side adjacent to ascending colon and cecum. Additionally nodularity adjacent to tip of cecum involving peritoneum. 2.9 cm left hemipelvic cyst suspicious for ovarian neoplasia. 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 Patient was negative for BRCA.\par \par She was planned for exploratory laparotomy and excision of the mass with Dr. Barrie Avelar but had a second opinion with Dr. Flores who recommended further work up of the mass with an IR biopsy.  Biopsy done on 2/16/22 and results showed poorly differentiated carcinoma consistent with serous carcinoma. Immunohistochemical stains for CK-7, CK-20, PAX 8, P53, WT1 and P16 were performed on block 1A at [facility] and interpreted at [facility] as follows:  \par CK-7, PAX 8, P16, P53, WT1: Positive.  CK-20: Negative\par \par The patient was started on carbo/taxol/estuardo. 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 The patient underwent interval, optimal debulking surgery on 6/6/2022 after 3 cycles of neoadjuvant chemotherapy. She completed an additional 3 cycloes of chemotherapy and was started on bevacizumab maintenance. Olaparib was added.  [de-identified] : Patient presents on daily Lynparza and Q3 week Marce for high grade serous primary peritoneal carcinomatosis s/p ex-lap supracervical hysterectomy and omentectomy with Dr. Jone Flores on 6/6/22.\par + Arthralgia/myalgia, crampy discomfort in LEs from hips to feet, improved with gabapentin. + Fatigue, again slightly worse. + Mucositis, remains very intermittent and just 1-2 lesions at a time. + Decreased appetite, unchanged.  + Non-productive intermittent cough with sore "scratchy" throat, begins with taking Lynparza and last ~1-2 hours, unchanged. + Dysgeusia, unchanged.  + Nail changes, unchanged. + Xerostomia. + Baseline xeroderma, no worse. Dyspepsia resolved. No N/V/D, epistaxis, fever or SOB.\par \par \par

## 2023-03-14 NOTE — RESULTS/DATA
[FreeTextEntry1] : 6/6/22 Debulking surgery\par Final Diagnosis\par \par 1. Omentum, partial Omentectomy ( 12 cm segment)\par \par - Omentum with adhesions and chronic inflammation\par - No atypia or malignancy identified\par \par 2. Uterus, corpus , hysterectomy\par - 35 Gram uterine corpus\par - Serosa: Adhesions and chronic inflammation\par - Cervix: Not seen, supra cervical specimen\par - Endometrium: Weakly proliferative with cystic atrophy. Endometrial polyp\par ( 1.0 cm)\par Negative for hyperplasia\par - Myometrium: Leiomyomas. Adenomyosis.\par - Negative for atypia or malignancy\par \par 3. Gastro colic ligament, excision\par - Fibrofatty tissue with scar, chronic inflammation, foreign body\par granuloma reaction\par - Negative for atypia or malignancy\par \par 5/5/22 Chest/ab/pelvis\par IMPRESSION:\par No gross evidence for carcinomatosis. 2.3 x 1.2 cm fluid density in the expected location of left adnexa this patient status post bilateral salpingo-oophorectomy. This may represent a small seroma or lymphocele. Minimal hazy soft tissue stranding in the supra colic omentum at site of previously visualized omental implant.\par \par 3/5/22 Chest CT \par IMPRESSION:\par Stable 3 mm right middle lobe nodule.\par \par Slightly larger right cardiophrenic lymphadenopathy. Stable left supradiaphragmatic lymph nodes.\par \par Partially visualized peritoneal nodularity in the left upper quadrant.\par \par \par Pathology 2/25/22 \par Accession:                             [id number]\par \par Collected Date/Time:                   2/25/2022 12:15 EST\par Received Date/Time:                    2/25/2022 12:15 EST\par \par Surgical Pathology Consultation Report - Auth (Verified)\par \par Specimen(s) Submitted\par \par 1. Slide Consult- Right omental nodule biopsy (11 slides, [id number],\par 1A1, PAX8, PAX8, CK7, CK7, WT1, P16, P53, WT1, P53)\par \par Final Diagnosis\par Submitting Institution:  [facility]\par \par \par Date of Procedure:  2/16/22\par \par Right omental nodule biopsy\par - Compatible with high grade serous carcinoma of adnexal or peritoneal\par origin, see note\par \par Note: In submitted immunostains, tumor cells are positive for CK7, PAX8,\par WT-1 and p16(diffuse). Tumor cells show aberrant expression(over -\par expression) for p53.\par \par 1/26/22 CT abdomen/pelvis \par multi lobular soft tissue masses involving the omentum most pronounced right side adjacent to the ascending colon and cecum. Additional nodularity adjacent to the tip of the cecum involving the peritoneum. 2.9 cm left hemipelvic cyst. Findings suspicious for ovarian neoplasia. \par

## 2023-03-14 NOTE — ASSESSMENT
Consent: The patient's consent was obtained.  Risks discussed including but not limited to risks of crusting, scabbing, blistering, scarring, darker or lighter pigmentary change, pain, and infection.  The patient understands the procedure is cosmetic in nature and will require out of pocket payment. [FreeTextEntry1] : # Primary Peritoneal Cancer, high grade serous, stage III, HRD positive \par - s/p 3 cycles of neoadjuvant chemotherapy with carbo/taxol/marce. Marce held at cycle 3 in anticipation of surgery. \par - s/p interval debulking surgery on 6/6/2022. Patient had optimal debulking surgery. \par - then s/p additional 3 cycles of carbo/taxol, then started on maintenance bevacizumab. \par - Patient started on olaparib, initially at dose reduction. Patient was tolerating well and dose increased to 300 mg BID. But patient then had mucositis and decreased appetite with significant weight loss so dose was reduced back to 200mg BID.  Patient tolerating this dose well. \par - CBC has been stable, no anemia or neutropenia.  Occasional slight thrombocytopenia\par - RTC Q3 weeks for Marce with MD/PA follow up Q6 weeks  \par - restaging CT scheduled for mid March\par  Topical Anesthesia Type: BLT cream (benzocaine 30%, lidocaine 12%, tetracaine 6%) Detail Level: Zone Location #1: upper and lower eyelids Length Of Topical Anesthesia Application (Optional): 20 minutes Price (Use Numbers Only, No Special Characters Or $): 2000.00 Post-Care Instructions: I reviewed with the patient in detail post-care instructions.  Stressed the importance of sun avoidance and sun protection.  Patient is to avoid picking at any of the treated lesions. Pt is to follow post care until scabs are no longer present. Patient disclosed she has sjogrens, and understands this treatment can potentially contribute to additional irritation. Treatment Number (Optional): 1 [place]: Plasma Pen Classic

## 2023-03-17 ENCOUNTER — OUTPATIENT (OUTPATIENT)
Dept: OUTPATIENT SERVICES | Facility: HOSPITAL | Age: 68
LOS: 1 days | End: 2023-03-17

## 2023-03-17 ENCOUNTER — APPOINTMENT (OUTPATIENT)
Dept: CT IMAGING | Facility: CLINIC | Age: 68
End: 2023-03-17
Payer: MEDICARE

## 2023-03-17 DIAGNOSIS — Z00.8 ENCOUNTER FOR OTHER GENERAL EXAMINATION: ICD-10-CM

## 2023-03-17 DIAGNOSIS — Z98.890 OTHER SPECIFIED POSTPROCEDURAL STATES: Chronic | ICD-10-CM

## 2023-03-17 PROCEDURE — 74177 CT ABD & PELVIS W/CONTRAST: CPT | Mod: 26

## 2023-03-17 PROCEDURE — 71260 CT THORAX DX C+: CPT | Mod: 26

## 2023-03-24 ENCOUNTER — RESULT REVIEW (OUTPATIENT)
Age: 68
End: 2023-03-24

## 2023-03-24 ENCOUNTER — LABORATORY RESULT (OUTPATIENT)
Age: 68
End: 2023-03-24

## 2023-03-24 ENCOUNTER — APPOINTMENT (OUTPATIENT)
Age: 68
End: 2023-03-24

## 2023-03-24 LAB
ALBUMIN SERPL ELPH-MCNC: 4.1 G/DL — SIGNIFICANT CHANGE UP (ref 3.3–5)
ALP SERPL-CCNC: 59 U/L — SIGNIFICANT CHANGE UP (ref 40–120)
ALT FLD-CCNC: 18 U/L — SIGNIFICANT CHANGE UP (ref 10–45)
ANION GAP SERPL CALC-SCNC: 12 MMOL/L — SIGNIFICANT CHANGE UP (ref 5–17)
AST SERPL-CCNC: 30 U/L — SIGNIFICANT CHANGE UP (ref 10–40)
BASOPHILS # BLD AUTO: 0 K/UL — SIGNIFICANT CHANGE UP (ref 0–0.2)
BASOPHILS NFR BLD AUTO: 1 % — SIGNIFICANT CHANGE UP (ref 0–2)
BILIRUB SERPL-MCNC: 0.4 MG/DL — SIGNIFICANT CHANGE UP (ref 0.2–1.2)
BUN SERPL-MCNC: 18 MG/DL — SIGNIFICANT CHANGE UP (ref 7–23)
CALCIUM SERPL-MCNC: 9.7 MG/DL — SIGNIFICANT CHANGE UP (ref 8.4–10.5)
CHLORIDE SERPL-SCNC: 103 MMOL/L — SIGNIFICANT CHANGE UP (ref 96–108)
CO2 SERPL-SCNC: 24 MMOL/L — SIGNIFICANT CHANGE UP (ref 22–31)
CREAT SERPL-MCNC: 0.62 MG/DL — SIGNIFICANT CHANGE UP (ref 0.5–1.3)
EGFR: 98 ML/MIN/1.73M2 — SIGNIFICANT CHANGE UP
EOSINOPHIL # BLD AUTO: 0.1 K/UL — SIGNIFICANT CHANGE UP (ref 0–0.5)
EOSINOPHIL NFR BLD AUTO: 1.8 % — SIGNIFICANT CHANGE UP (ref 0–6)
GLUCOSE SERPL-MCNC: 103 MG/DL — HIGH (ref 70–99)
HCT VFR BLD CALC: 37.8 % — SIGNIFICANT CHANGE UP (ref 34.5–45)
HGB BLD-MCNC: 13.2 G/DL — SIGNIFICANT CHANGE UP (ref 11.5–15.5)
LYMPHOCYTES # BLD AUTO: 0.8 K/UL — LOW (ref 1–3.3)
LYMPHOCYTES # BLD AUTO: 23 % — SIGNIFICANT CHANGE UP (ref 13–44)
MCHC RBC-ENTMCNC: 34.8 G/DL — SIGNIFICANT CHANGE UP (ref 32–36)
MCHC RBC-ENTMCNC: 36.3 PG — HIGH (ref 27–34)
MCV RBC AUTO: 104.3 FL — HIGH (ref 80–100)
MONOCYTES # BLD AUTO: 0.4 K/UL — SIGNIFICANT CHANGE UP (ref 0–0.9)
MONOCYTES NFR BLD AUTO: 12.6 % — SIGNIFICANT CHANGE UP (ref 2–14)
NEUTROPHILS # BLD AUTO: 2.1 K/UL — SIGNIFICANT CHANGE UP (ref 1.8–7.4)
NEUTROPHILS NFR BLD AUTO: 61.6 % — SIGNIFICANT CHANGE UP (ref 43–77)
PLATELET # BLD AUTO: 149 K/UL — LOW (ref 150–400)
POTASSIUM SERPL-MCNC: 5.2 MMOL/L — SIGNIFICANT CHANGE UP (ref 3.5–5.3)
POTASSIUM SERPL-SCNC: 5.2 MMOL/L — SIGNIFICANT CHANGE UP (ref 3.5–5.3)
PROT SERPL-MCNC: 6.6 G/DL — SIGNIFICANT CHANGE UP (ref 6–8.3)
RBC # BLD: 3.63 M/UL — LOW (ref 3.8–5.2)
RBC # FLD: 13.3 % — SIGNIFICANT CHANGE UP (ref 10.3–14.5)
SODIUM SERPL-SCNC: 139 MMOL/L — SIGNIFICANT CHANGE UP (ref 135–145)
WBC # BLD: 3.4 K/UL — LOW (ref 3.8–10.5)
WBC # FLD AUTO: 3.4 K/UL — LOW (ref 3.8–10.5)

## 2023-03-29 ENCOUNTER — FORM ENCOUNTER (OUTPATIENT)
Age: 68
End: 2023-03-29

## 2023-04-14 ENCOUNTER — RESULT REVIEW (OUTPATIENT)
Age: 68
End: 2023-04-14

## 2023-04-14 ENCOUNTER — APPOINTMENT (OUTPATIENT)
Dept: HEMATOLOGY ONCOLOGY | Facility: CLINIC | Age: 68
End: 2023-04-14
Payer: MEDICARE

## 2023-04-14 ENCOUNTER — APPOINTMENT (OUTPATIENT)
Age: 68
End: 2023-04-14

## 2023-04-14 VITALS
HEART RATE: 73 BPM | BODY MASS INDEX: 26.87 KG/M2 | DIASTOLIC BLOOD PRESSURE: 75 MMHG | WEIGHT: 146 LBS | HEIGHT: 62 IN | TEMPERATURE: 97 F | SYSTOLIC BLOOD PRESSURE: 144 MMHG | OXYGEN SATURATION: 97 %

## 2023-04-14 LAB
BASOPHILS # BLD AUTO: 0.1 K/UL — SIGNIFICANT CHANGE UP (ref 0–0.2)
BASOPHILS NFR BLD AUTO: 1.2 % — SIGNIFICANT CHANGE UP (ref 0–2)
EOSINOPHIL # BLD AUTO: 0.1 K/UL — SIGNIFICANT CHANGE UP (ref 0–0.5)
EOSINOPHIL NFR BLD AUTO: 1.7 % — SIGNIFICANT CHANGE UP (ref 0–6)
HCT VFR BLD CALC: 37.5 % — SIGNIFICANT CHANGE UP (ref 34.5–45)
HGB BLD-MCNC: 13 G/DL — SIGNIFICANT CHANGE UP (ref 11.5–15.5)
LYMPHOCYTES # BLD AUTO: 1.1 K/UL — SIGNIFICANT CHANGE UP (ref 1–3.3)
LYMPHOCYTES # BLD AUTO: 25.7 % — SIGNIFICANT CHANGE UP (ref 13–44)
MCHC RBC-ENTMCNC: 34.6 G/DL — SIGNIFICANT CHANGE UP (ref 32–36)
MCHC RBC-ENTMCNC: 37.2 PG — HIGH (ref 27–34)
MCV RBC AUTO: 107.5 FL — HIGH (ref 80–100)
MONOCYTES # BLD AUTO: 0.4 K/UL — SIGNIFICANT CHANGE UP (ref 0–0.9)
MONOCYTES NFR BLD AUTO: 10 % — SIGNIFICANT CHANGE UP (ref 2–14)
NEUTROPHILS # BLD AUTO: 2.7 K/UL — SIGNIFICANT CHANGE UP (ref 1.8–7.4)
NEUTROPHILS NFR BLD AUTO: 61.4 % — SIGNIFICANT CHANGE UP (ref 43–77)
PLATELET # BLD AUTO: 140 K/UL — LOW (ref 150–400)
RBC # BLD: 3.48 M/UL — LOW (ref 3.8–5.2)
RBC # FLD: 13.4 % — SIGNIFICANT CHANGE UP (ref 10.3–14.5)
WBC # BLD: 4.4 K/UL — SIGNIFICANT CHANGE UP (ref 3.8–10.5)
WBC # FLD AUTO: 4.4 K/UL — SIGNIFICANT CHANGE UP (ref 3.8–10.5)

## 2023-04-14 PROCEDURE — 99214 OFFICE O/P EST MOD 30 MIN: CPT

## 2023-04-15 LAB
ALBUMIN SERPL ELPH-MCNC: 4.1 G/DL — SIGNIFICANT CHANGE UP (ref 3.3–5)
ALP SERPL-CCNC: 62 U/L — SIGNIFICANT CHANGE UP (ref 40–120)
ALT FLD-CCNC: 16 U/L — SIGNIFICANT CHANGE UP (ref 10–45)
ANION GAP SERPL CALC-SCNC: 11 MMOL/L — SIGNIFICANT CHANGE UP (ref 5–17)
APPEARANCE UR: CLEAR — SIGNIFICANT CHANGE UP
AST SERPL-CCNC: 24 U/L — SIGNIFICANT CHANGE UP (ref 10–40)
BILIRUB SERPL-MCNC: 0.4 MG/DL — SIGNIFICANT CHANGE UP (ref 0.2–1.2)
BILIRUB UR-MCNC: NEGATIVE — SIGNIFICANT CHANGE UP
BUN SERPL-MCNC: 20 MG/DL — SIGNIFICANT CHANGE UP (ref 7–23)
CALCIUM SERPL-MCNC: 9.5 MG/DL — SIGNIFICANT CHANGE UP (ref 8.4–10.5)
CHLORIDE SERPL-SCNC: 101 MMOL/L — SIGNIFICANT CHANGE UP (ref 96–108)
CO2 SERPL-SCNC: 26 MMOL/L — SIGNIFICANT CHANGE UP (ref 22–31)
COLOR SPEC: SIGNIFICANT CHANGE UP
CREAT SERPL-MCNC: 0.71 MG/DL — SIGNIFICANT CHANGE UP (ref 0.5–1.3)
DIFF PNL FLD: NEGATIVE — SIGNIFICANT CHANGE UP
EGFR: 94 ML/MIN/1.73M2 — SIGNIFICANT CHANGE UP
GLUCOSE SERPL-MCNC: 105 MG/DL — HIGH (ref 70–99)
GLUCOSE UR QL: NEGATIVE — SIGNIFICANT CHANGE UP
KETONES UR-MCNC: NEGATIVE — SIGNIFICANT CHANGE UP
LEUKOCYTE ESTERASE UR-ACNC: NEGATIVE — SIGNIFICANT CHANGE UP
NITRITE UR-MCNC: NEGATIVE — SIGNIFICANT CHANGE UP
PH UR: 6.5 — SIGNIFICANT CHANGE UP (ref 5–8)
POTASSIUM SERPL-MCNC: 4.2 MMOL/L — SIGNIFICANT CHANGE UP (ref 3.5–5.3)
POTASSIUM SERPL-SCNC: 4.2 MMOL/L — SIGNIFICANT CHANGE UP (ref 3.5–5.3)
PROT SERPL-MCNC: 6.2 G/DL — SIGNIFICANT CHANGE UP (ref 6–8.3)
PROT UR-MCNC: NEGATIVE — SIGNIFICANT CHANGE UP
SODIUM SERPL-SCNC: 138 MMOL/L — SIGNIFICANT CHANGE UP (ref 135–145)
SP GR SPEC: 1.01 — SIGNIFICANT CHANGE UP (ref 1.01–1.02)
UROBILINOGEN FLD QL: SIGNIFICANT CHANGE UP

## 2023-04-19 NOTE — REVIEW OF SYSTEMS
[Fever] : no fever [Chills] : no chills [Chest Pain] : no chest pain [Shortness Of Breath] : no shortness of breath [Abdominal Pain] : no abdominal pain [Easy Bleeding] : no tendency for easy bleeding [Easy Bruising] : no tendency for easy bruising [Swollen Glands] : no swollen glands [FreeTextEntry2] : negative except as reviewed in interval history

## 2023-04-19 NOTE — ASSESSMENT
[FreeTextEntry1] : Reviewed: \par 3/17/23 CT c/a/p: No CT evidence of local tumor recurrence or metastatic disease in the chest, abdomen and pelvis.\par 04/14/2023 Today's CBC: WBC 4.4 K, HGB 13.0 g, HCT 37.5 %,  K, ANC 2700\par \par # Primary Peritoneal Cancer, high grade serous, stage III, HRD positive \par - s/p 3 cycles of neoadjuvant chemotherapy with carbo/taxol/macre. Marce held at cycle 3 in anticipation of surgery. \par - s/p interval debulking surgery on 6/6/2022. Patient had optimal debulking surgery. \par - then s/p additional 3 cycles of carbo/taxol, then started on maintenance bevacizumab. \par - Patient started on olaparib, initially at dose reduction. Patient was tolerating well and dose increased to 300 mg BID. But patient then had mucositis and decreased appetite with significant weight loss so dose was reduced back to 200mg BID.  Patient continues tolerating this dose well. \par - CBC has been stable, no anemia or neutropenia.  Occasional slight thrombocytopenia\par - RTC Q3 weeks for Marce with MD/PA follow up Q6 weeks  \par - restaging CT in 3/2023 KHUSHI \par - RTO in 1 month with PA \par

## 2023-04-19 NOTE — PHYSICAL EXAM
[Fully active, able to carry on all pre-disease performance without restriction] : Status 0 - Fully active, able to carry on all pre-disease performance without restriction [Thin] : thin [Normal] : affect appropriate [de-identified] : Patient has bilateral hearing loss

## 2023-04-19 NOTE — HISTORY OF PRESENT ILLNESS
[de-identified] : 65 yo F with h/o HTN, hyperlipidemia, GERD, decreased hearing (present since childhood) and depression who was diagnosed with serous carcinoma found in a right side omental nodule in February 2022.\par \par She saw her GYN, Dr. Melissa Jaimes, for her annual exam in November 2021.  A transvaginal U/S showed abnormal endometrial thickening and uterine fibroids. CT A/P performed on 1/26/22 revealed multiple lobular soft tissue masses involving the omentum most pronounced on the R. side adjacent to ascending colon and cecum. Additionally nodularity adjacent to tip of cecum involving peritoneum. 2.9 cm left hemipelvic cyst suspicious for ovarian neoplasia. 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 Patient was negative for BRCA.\par \par She was planned for exploratory laparotomy and excision of the mass with Dr. Barrie Avelar but had a second opinion with Dr. Flores who recommended further work up of the mass with an IR biopsy.  Biopsy done on 2/16/22 and results showed poorly differentiated carcinoma consistent with serous carcinoma. Immunohistochemical stains for CK-7, CK-20, PAX 8, P53, WT1 and P16 were performed on block 1A at [facility] and interpreted at [facility] as follows:  \par CK-7, PAX 8, P16, P53, WT1: Positive.  CK-20: Negative\par \par The patient was started on carbo/taxol/estuardo. 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 The patient underwent interval, optimal debulking surgery on 6/6/2022 after 3 cycles of neoadjuvant chemotherapy. She completed an additional 3 cycles of chemotherapy and was started on bevacizumab maintenance. Olaparib was added.  [de-identified] : 3/3/23 Patient presents on daily Lynparza and Q3 week Marce for high grade serous primary peritoneal carcinomatosis s/p ex-lap supracervical hysterectomy and omentectomy with Dr. Jone Flores on 6/6/22.\par + Arthralgia/myalgia, crampy discomfort in LEs from hips to feet, improved with gabapentin. + Fatigue, again slightly worse. + Mucositis, remains very intermittent and just 1-2 lesions at a time. + Decreased appetite, unchanged.  + Non-productive intermittent cough with sore "scratchy" throat, begins with taking Lynparza and last ~1-2 hours, unchanged. + Dysgeusia, unchanged.  + Nail changes, unchanged. + Xerostomia. + Baseline xeroderma, no worse. Dyspepsia resolved. No N/V/D, epistaxis, fever or SOB.\par \par 04/14/2023 Returns for follow up visit with spouse. Continues on daily Lynparza and Q3 week Marce. Reports improved neuropathy with gabapentin use . Reports feels like eyelids want to close. Reports mild forgetfulness at times. Denies acute pain, ha, dizziness, blurry vision, fevers, chills, N/V/D/C. \par \par \par

## 2023-04-19 NOTE — RESULTS/DATA
[FreeTextEntry1] : 3/24/23: CT Abdomen/pelvis \par IMPRESSION:\par No CT evidence of local tumor recurrence or metastatic disease in the chest, abdomen and pelvis\par \par 6/6/22 Debulking surgery\par Final Diagnosis\par \par 1. Omentum, partial Omentectomy ( 12 cm segment)\par \par - Omentum with adhesions and chronic inflammation\par - No atypia or malignancy identified\par \par 2. Uterus, corpus , hysterectomy\par - 35 Gram uterine corpus\par - Serosa: Adhesions and chronic inflammation\par - Cervix: Not seen, supra cervical specimen\par - Endometrium: Weakly proliferative with cystic atrophy. Endometrial polyp\par ( 1.0 cm)\par Negative for hyperplasia\par - Myometrium: Leiomyomas. Adenomyosis.\par - Negative for atypia or malignancy\par \par 3. Gastro colic ligament, excision\par - Fibrofatty tissue with scar, chronic inflammation, foreign body\par granuloma reaction\par - Negative for atypia or malignancy\par \par 5/5/22 Chest/ab/pelvis\par IMPRESSION:\par No gross evidence for carcinomatosis. 2.3 x 1.2 cm fluid density in the expected location of left adnexa this patient status post bilateral salpingo-oophorectomy. This may represent a small seroma or lymphocele. Minimal hazy soft tissue stranding in the supra colic omentum at site of previously visualized omental implant.\par \par 3/5/22 Chest CT \par IMPRESSION:\par Stable 3 mm right middle lobe nodule.\par \par Slightly larger right cardiophrenic lymphadenopathy. Stable left supradiaphragmatic lymph nodes.\par \par Partially visualized peritoneal nodularity in the left upper quadrant.\par \par \par Pathology 2/25/22 \par Accession:                             [id number]\par \par Collected Date/Time:                   2/25/2022 12:15 EST\par Received Date/Time:                    2/25/2022 12:15 EST\par \par Surgical Pathology Consultation Report - Auth (Verified)\par \par Specimen(s) Submitted\par \par 1. Slide Consult- Right omental nodule biopsy (11 slides, [id number]-,\par 1A1, PAX8, PAX8, CK7, CK7, WT1, P16, P53, WT1, P53)\par \par Final Diagnosis\par Submitting Institution:  [facility]\par \par \par Date of Procedure:  2/16/22\par \par Right omental nodule biopsy\par - Compatible with high grade serous carcinoma of adnexal or peritoneal\par origin, see note\par \par Note: In submitted immunostains, tumor cells are positive for CK7, PAX8,\par WT-1 and p16(diffuse). Tumor cells show aberrant expression(over -\par expression) for p53.\par \par 1/26/22 CT abdomen/pelvis \par multi lobular soft tissue masses involving the omentum most pronounced right side adjacent to the ascending colon and cecum. Additional nodularity adjacent to the tip of the cecum involving the peritoneum. 2.9 cm left hemipelvic cyst. Findings suspicious for ovarian neoplasia. \par

## 2023-04-19 NOTE — ADDENDUM
[FreeTextEntry1] : Documented by Sola Campoverde acting as scribe for Dr. Garces on  04/14/2023.\par \par All Medical record entries made by the Scribe were at my, Dr. Garces's, direction and personally dictated by me on  04/14/2023. I have reviewed the chart and agree that the record accurately reflects my personal performance of the history, physical exam, assessment and plan. I have also personally directed, reviewed, and agreed with the discharge instructions.\par

## 2023-04-28 ENCOUNTER — OUTPATIENT (OUTPATIENT)
Dept: OUTPATIENT SERVICES | Facility: HOSPITAL | Age: 68
LOS: 1 days | Discharge: HOME | End: 2023-04-28

## 2023-04-28 DIAGNOSIS — Z98.890 OTHER SPECIFIED POSTPROCEDURAL STATES: Chronic | ICD-10-CM

## 2023-04-28 DIAGNOSIS — Z98.51 TUBAL LIGATION STATUS: Chronic | ICD-10-CM

## 2023-04-28 DIAGNOSIS — Z96.21 COCHLEAR IMPLANT STATUS: Chronic | ICD-10-CM

## 2023-04-28 DIAGNOSIS — Z90.722 ACQUIRED ABSENCE OF OVARIES, BILATERAL: Chronic | ICD-10-CM

## 2023-04-28 DIAGNOSIS — C48.2 MALIGNANT NEOPLASM OF PERITONEUM, UNSPECIFIED: ICD-10-CM

## 2023-05-05 ENCOUNTER — APPOINTMENT (OUTPATIENT)
Age: 68
End: 2023-05-05

## 2023-05-05 ENCOUNTER — RESULT REVIEW (OUTPATIENT)
Age: 68
End: 2023-05-05

## 2023-05-05 LAB
BASOPHILS # BLD AUTO: 0.1 K/UL — SIGNIFICANT CHANGE UP (ref 0–0.2)
BASOPHILS NFR BLD AUTO: 1.1 % — SIGNIFICANT CHANGE UP (ref 0–2)
EOSINOPHIL # BLD AUTO: 0.1 K/UL — SIGNIFICANT CHANGE UP (ref 0–0.5)
EOSINOPHIL NFR BLD AUTO: 1.4 % — SIGNIFICANT CHANGE UP (ref 0–6)
HCT VFR BLD CALC: 37 % — SIGNIFICANT CHANGE UP (ref 34.5–45)
HGB BLD-MCNC: 13 G/DL — SIGNIFICANT CHANGE UP (ref 11.5–15.5)
LYMPHOCYTES # BLD AUTO: 1.1 K/UL — SIGNIFICANT CHANGE UP (ref 1–3.3)
LYMPHOCYTES # BLD AUTO: 25.1 % — SIGNIFICANT CHANGE UP (ref 13–44)
MCHC RBC-ENTMCNC: 35.1 G/DL — SIGNIFICANT CHANGE UP (ref 32–36)
MCHC RBC-ENTMCNC: 36.4 PG — HIGH (ref 27–34)
MCV RBC AUTO: 103.6 FL — HIGH (ref 80–100)
MONOCYTES # BLD AUTO: 0.4 K/UL — SIGNIFICANT CHANGE UP (ref 0–0.9)
MONOCYTES NFR BLD AUTO: 9.1 % — SIGNIFICANT CHANGE UP (ref 2–14)
NEUTROPHILS # BLD AUTO: 2.9 K/UL — SIGNIFICANT CHANGE UP (ref 1.8–7.4)
NEUTROPHILS NFR BLD AUTO: 63.2 % — SIGNIFICANT CHANGE UP (ref 43–77)
PLATELET # BLD AUTO: 138 K/UL — LOW (ref 150–400)
RBC # BLD: 3.57 M/UL — LOW (ref 3.8–5.2)
RBC # FLD: 12.7 % — SIGNIFICANT CHANGE UP (ref 10.3–14.5)
WBC # BLD: 4.5 K/UL — SIGNIFICANT CHANGE UP (ref 3.8–10.5)
WBC # FLD AUTO: 4.5 K/UL — SIGNIFICANT CHANGE UP (ref 3.8–10.5)

## 2023-05-06 LAB
ALBUMIN SERPL ELPH-MCNC: 4 G/DL — SIGNIFICANT CHANGE UP (ref 3.3–5)
ALP SERPL-CCNC: 63 U/L — SIGNIFICANT CHANGE UP (ref 40–120)
ALT FLD-CCNC: 23 U/L — SIGNIFICANT CHANGE UP (ref 10–45)
ANION GAP SERPL CALC-SCNC: 10 MMOL/L — SIGNIFICANT CHANGE UP (ref 5–17)
APPEARANCE UR: CLEAR — SIGNIFICANT CHANGE UP
AST SERPL-CCNC: 36 U/L — SIGNIFICANT CHANGE UP (ref 10–40)
BILIRUB SERPL-MCNC: 0.4 MG/DL — SIGNIFICANT CHANGE UP (ref 0.2–1.2)
BILIRUB UR-MCNC: NEGATIVE — SIGNIFICANT CHANGE UP
BUN SERPL-MCNC: 17 MG/DL — SIGNIFICANT CHANGE UP (ref 7–23)
CALCIUM SERPL-MCNC: 9.4 MG/DL — SIGNIFICANT CHANGE UP (ref 8.4–10.5)
CHLORIDE SERPL-SCNC: 103 MMOL/L — SIGNIFICANT CHANGE UP (ref 96–108)
CO2 SERPL-SCNC: 25 MMOL/L — SIGNIFICANT CHANGE UP (ref 22–31)
COLOR SPEC: YELLOW — SIGNIFICANT CHANGE UP
CREAT SERPL-MCNC: 0.76 MG/DL — SIGNIFICANT CHANGE UP (ref 0.5–1.3)
DIFF PNL FLD: NEGATIVE — SIGNIFICANT CHANGE UP
EGFR: 86 ML/MIN/1.73M2 — SIGNIFICANT CHANGE UP
GLUCOSE SERPL-MCNC: 94 MG/DL — SIGNIFICANT CHANGE UP (ref 70–99)
GLUCOSE UR QL: NEGATIVE MG/DL — SIGNIFICANT CHANGE UP
KETONES UR-MCNC: NEGATIVE MG/DL — SIGNIFICANT CHANGE UP
LEUKOCYTE ESTERASE UR-ACNC: NEGATIVE — SIGNIFICANT CHANGE UP
NITRITE UR-MCNC: NEGATIVE — SIGNIFICANT CHANGE UP
PH UR: 6 — SIGNIFICANT CHANGE UP (ref 5–8)
POTASSIUM SERPL-MCNC: 4.6 MMOL/L — SIGNIFICANT CHANGE UP (ref 3.5–5.3)
POTASSIUM SERPL-SCNC: 4.6 MMOL/L — SIGNIFICANT CHANGE UP (ref 3.5–5.3)
PROT SERPL-MCNC: 6.5 G/DL — SIGNIFICANT CHANGE UP (ref 6–8.3)
PROT UR-MCNC: NEGATIVE MG/DL — SIGNIFICANT CHANGE UP
SODIUM SERPL-SCNC: 138 MMOL/L — SIGNIFICANT CHANGE UP (ref 135–145)
SP GR SPEC: 1.01 — SIGNIFICANT CHANGE UP (ref 1–1.03)
UROBILINOGEN FLD QL: 0.2 MG/DL — SIGNIFICANT CHANGE UP (ref 0.2–1)

## 2023-05-08 DIAGNOSIS — Z51.11 ENCOUNTER FOR ANTINEOPLASTIC CHEMOTHERAPY: ICD-10-CM

## 2023-05-08 DIAGNOSIS — R11.2 NAUSEA WITH VOMITING, UNSPECIFIED: ICD-10-CM

## 2023-05-09 ENCOUNTER — OUTPATIENT (OUTPATIENT)
Dept: OUTPATIENT SERVICES | Facility: HOSPITAL | Age: 68
LOS: 1 days | Discharge: HOME | End: 2023-05-09

## 2023-05-09 ENCOUNTER — APPOINTMENT (OUTPATIENT)
Dept: GYNECOLOGIC ONCOLOGY | Facility: CLINIC | Age: 68
End: 2023-05-09
Payer: MEDICARE

## 2023-05-09 VITALS
OXYGEN SATURATION: 98 % | TEMPERATURE: 97 F | WEIGHT: 145 LBS | BODY MASS INDEX: 26.68 KG/M2 | DIASTOLIC BLOOD PRESSURE: 82 MMHG | HEIGHT: 62 IN | HEART RATE: 75 BPM | SYSTOLIC BLOOD PRESSURE: 126 MMHG

## 2023-05-09 DIAGNOSIS — Z90.722 ACQUIRED ABSENCE OF OVARIES, BILATERAL: Chronic | ICD-10-CM

## 2023-05-09 DIAGNOSIS — Z98.51 TUBAL LIGATION STATUS: Chronic | ICD-10-CM

## 2023-05-09 DIAGNOSIS — Z87.898 PERSONAL HISTORY OF OTHER SPECIFIED CONDITIONS: ICD-10-CM

## 2023-05-09 DIAGNOSIS — Z98.890 OTHER SPECIFIED POSTPROCEDURAL STATES: Chronic | ICD-10-CM

## 2023-05-09 DIAGNOSIS — K59.00 CONSTIPATION, UNSPECIFIED: ICD-10-CM

## 2023-05-09 DIAGNOSIS — Z23 ENCOUNTER FOR IMMUNIZATION: ICD-10-CM

## 2023-05-09 DIAGNOSIS — Z96.21 COCHLEAR IMPLANT STATUS: Chronic | ICD-10-CM

## 2023-05-09 DIAGNOSIS — C48.2 MALIGNANT NEOPLASM OF PERITONEUM, UNSPECIFIED: ICD-10-CM

## 2023-05-09 PROCEDURE — 99214 OFFICE O/P EST MOD 30 MIN: CPT

## 2023-05-10 PROBLEM — Z87.898 HISTORY OF PELVIC MASS: Status: RESOLVED | Noted: 2022-02-08 | Resolved: 2023-05-10

## 2023-05-10 PROBLEM — K59.00 CONSTIPATION: Status: ACTIVE | Noted: 2023-05-10

## 2023-05-10 PROBLEM — Z23 ENCOUNTER FOR IMMUNIZATION: Status: RESOLVED | Noted: 2022-05-24 | Resolved: 2023-05-10

## 2023-05-12 ENCOUNTER — APPOINTMENT (OUTPATIENT)
Dept: HEMATOLOGY ONCOLOGY | Facility: CLINIC | Age: 68
End: 2023-05-12

## 2023-05-18 NOTE — ASSESSMENT
[Curative] : Goals of care discussed with patient: Curative [FreeTextEntry1] : \par 1 Primary Peritoneal Cancer, high grade serous, stage III, HRD positive with no evidence of disease\par 2. Neuropathy, grade 2 managed with gapapentin \par 3. constipation managed with miralax\par 4. fatigue that is mild and stable

## 2023-05-18 NOTE — REVIEW OF SYSTEMS
[Fatigue] : fatigue [Constipation] : constipation [Joint Pain] : joint pain [Joint Stiffness] : joint stiffness [Muscle Pain] : muscle pain [Negative] : Allergic/Immunologic [Fever] : no fever [Chills] : no chills [Recent Change In Weight] : ~T no recent weight change [Odynophagia] : no odynophagia [Mucosal Pain] : no mucosal pain [Chest Pain] : no chest pain [Shortness Of Breath] : no shortness of breath [Abdominal Pain] : no abdominal pain [Easy Bleeding] : no tendency for easy bleeding [Easy Bruising] : no tendency for easy bruising [Swollen Glands] : no swollen glands [FreeTextEntry2] : negative except as reviewed in interval history  [FreeTextEntry4] : History of hearing deficits [de-identified] : LE neuropathy

## 2023-05-18 NOTE — RESULTS/DATA
[FreeTextEntry1] : 3/24/23: CT Abdomen/pelvis \par IMPRESSION:\par No CT evidence of local tumor recurrence or metastatic disease in the chest, abdomen and pelvis\par \par 6/6/22 Debulking surgery\par Final Diagnosis\par \par 1. Omentum, partial Omentectomy ( 12 cm segment)\par \par - Omentum with adhesions and chronic inflammation\par - No atypia or malignancy identified\par \par 2. Uterus, corpus , hysterectomy\par - 35 Gram uterine corpus\par - Serosa: Adhesions and chronic inflammation\par - Cervix: Not seen, supra cervical specimen\par - Endometrium: Weakly proliferative with cystic atrophy. Endometrial polyp\par ( 1.0 cm)\par Negative for hyperplasia\par - Myometrium: Leiomyomas. Adenomyosis.\par - Negative for atypia or malignancy\par \par 3. Gastro colic ligament, excision\par - Fibrofatty tissue with scar, chronic inflammation, foreign body\par granuloma reaction\par - Negative for atypia or malignancy\par \par 5/5/22 Chest/ab/pelvis\par IMPRESSION:\par No gross evidence for carcinomatosis. 2.3 x 1.2 cm fluid density in the expected location of left adnexa this patient status post bilateral salpingo-oophorectomy. This may represent a small seroma or lymphocele. Minimal hazy soft tissue stranding in the supra colic omentum at site of previously visualized omental implant.\par \par 3/5/22 Chest CT \par IMPRESSION:\par Stable 3 mm right middle lobe nodule.\par \par Slightly larger right cardiophrenic lymphadenopathy. Stable left supradiaphragmatic lymph nodes.\par \par Partially visualized peritoneal nodularity in the left upper quadrant.\par \par \par Pathology 2/25/22 \par Accession:                             [id number]\par \par Collected Date/Time:                   2/25/2022 12:15 EST\par Received Date/Time:                    2/25/2022 12:15 EST\par \par Surgical Pathology Consultation Report - Auth (Verified)\par \par Specimen(s) Submitted\par \par 1. Slide Consult- Right omental nodule biopsy (11 slides, 87-R-,\par 1A1, PAX8, PAX8, CK7, CK7, WT1, P16, P53, WT1, P53)\par \par Final Diagnosis\par Submitting Institution:  [facility]\par \par \par Date of Procedure:  2/16/22\par \par Right omental nodule biopsy\par - Compatible with high grade serous carcinoma of adnexal or peritoneal\par origin, see note\par \par Note: In submitted immunostains, tumor cells are positive for CK7, PAX8,\par WT-1 and p16(diffuse). Tumor cells show aberrant expression(over -\par expression) for p53.\par \par 1/26/22 CT abdomen/pelvis \par multi lobular soft tissue masses involving the omentum most pronounced right side adjacent to the ascending colon and cecum. Additional nodularity adjacent to the tip of the cecum involving the peritoneum. 2.9 cm left hemipelvic cyst. Findings suspicious for ovarian neoplasia. \par

## 2023-05-18 NOTE — HISTORY OF PRESENT ILLNESS
[Cardiovascular] : Cardiovascular [ENT] : ENT [Dermatologic] : Dermatologic [Endocrine] : Endocrine [Genitourinary] : Genitourinary [Gynecologic] : Gynecologic [Infectious] : Infectious [Pulmonary] : Pulmonary [Disease: _____________________] : Disease: [unfilled] [de-identified] : The patient presents for follow up while on maintenance therapy with Marce and Olaparib for her primary peritoneal carcinoma. She is taking 200mg of Olaparib and Marce q3w and she is tolerating this well. She initially was on 200mg BID that was escalated to 300mg BID olaparib but required a dose reduction because of mucositis and decreased appetite with significant weight loss so dose was reduced back to 200mg BID.  She continues to have knee stiffness and lower leg pain. She has been taking Gabapentin prn, which has helped. Its not clear to me if this is neuropathic pain or some other cause. She has a normal appetite. She reports intermittent constipation for which she takes Miralax with some success. She denies mouth sores, nausea, vomiting, abdominal pain. She feels like she is tolerating her treatments well. She still complains of fatigue that she just manages. \par \par  [de-identified] : Arthralgia [de-identified] : fatigue [de-identified] : Neuropathy with pain in LE [de-identified] : Musculoskeletal: [TWNoteComboBox3] : Grade: 2 [de-identified] : Constitutional: [de-identified] : Grade: 1 [de-identified] : Neurologic: [de-identified] : Grade: 2 [de-identified] : The patient is a 68 yo F with h/o HTN, hyperlipidemia, GERD, decreased hearing (present since childhood), depression as well as a RAD51c gene mutation s/p prophylactic BSO in 2018 who was diagnosed with high-grade serous carcinoma found in a right side omental nodule in February 2022. She underwent 3 cylces of Carbo/Taxol/Marce followed by interval debulking including a supracervical hysterectomy and omentectomy. Surgical findings did not reveal any gross disease but the uterus was stuck to the rectum.\par \par  was initially elevated at 211 on 1/18/22\par Tumor demonstrated CK-7, PAX 8, P16, P53, WT1: Positive.  CK-20: Negative\par \par The patient was found to be HRD positive by Myriad testing. She completed an additional 3 cycles of chemotherapy and was started on bevacizumab maintenance. Olaparib was added given her HRD positive status. [de-identified] : high grade serous [de-identified] : ca125 211

## 2023-05-18 NOTE — PHYSICAL EXAM
[Thin] : thin [Restricted in physically strenuous activity but ambulatory and able to carry out work of a light or sedentary nature] : Status 1- Restricted in physically strenuous activity but ambulatory and able to carry out work of a light or sedentary nature, e.g., light house work, office work [Normal] : no JVD, no calf tenderness, venous stasis changes, varices [Ulcers] : no ulcers [Mucositis] : no mucositis [de-identified] : N [de-identified] : Patient has bilateral hearing loss. No mucosal lesions [de-identified] : Brisk bilateral upper extremity reflexes: equal and symmetrical, LE absent bilat

## 2023-05-23 ENCOUNTER — RESULT REVIEW (OUTPATIENT)
Age: 68
End: 2023-05-23

## 2023-05-23 ENCOUNTER — APPOINTMENT (OUTPATIENT)
Dept: GYNECOLOGIC ONCOLOGY | Facility: CLINIC | Age: 68
End: 2023-05-23

## 2023-05-23 ENCOUNTER — APPOINTMENT (OUTPATIENT)
Dept: HEMATOLOGY ONCOLOGY | Facility: CLINIC | Age: 68
End: 2023-05-23
Payer: MEDICARE

## 2023-05-23 VITALS
OXYGEN SATURATION: 97 % | HEIGHT: 62 IN | HEART RATE: 74 BPM | BODY MASS INDEX: 26.48 KG/M2 | TEMPERATURE: 97.3 F | SYSTOLIC BLOOD PRESSURE: 139 MMHG | WEIGHT: 143.9 LBS | DIASTOLIC BLOOD PRESSURE: 72 MMHG

## 2023-05-23 LAB
BASOPHILS # BLD AUTO: 0.1 K/UL — SIGNIFICANT CHANGE UP (ref 0–0.2)
BASOPHILS NFR BLD AUTO: 1.3 % — SIGNIFICANT CHANGE UP (ref 0–2)
EOSINOPHIL # BLD AUTO: 0.1 K/UL — SIGNIFICANT CHANGE UP (ref 0–0.5)
EOSINOPHIL NFR BLD AUTO: 1.5 % — SIGNIFICANT CHANGE UP (ref 0–6)
HCT VFR BLD CALC: 37.2 % — SIGNIFICANT CHANGE UP (ref 34.5–45)
HGB BLD-MCNC: 13.1 G/DL — SIGNIFICANT CHANGE UP (ref 11.5–15.5)
LYMPHOCYTES # BLD AUTO: 1.2 K/UL — SIGNIFICANT CHANGE UP (ref 1–3.3)
LYMPHOCYTES # BLD AUTO: 25 % — SIGNIFICANT CHANGE UP (ref 13–44)
MCHC RBC-ENTMCNC: 35.4 G/DL — SIGNIFICANT CHANGE UP (ref 32–36)
MCHC RBC-ENTMCNC: 36.5 PG — HIGH (ref 27–34)
MCV RBC AUTO: 103.3 FL — HIGH (ref 80–100)
MONOCYTES # BLD AUTO: 0.4 K/UL — SIGNIFICANT CHANGE UP (ref 0–0.9)
MONOCYTES NFR BLD AUTO: 8.4 % — SIGNIFICANT CHANGE UP (ref 2–14)
NEUTROPHILS # BLD AUTO: 2.9 K/UL — SIGNIFICANT CHANGE UP (ref 1.8–7.4)
NEUTROPHILS NFR BLD AUTO: 63.8 % — SIGNIFICANT CHANGE UP (ref 43–77)
PLATELET # BLD AUTO: 135 K/UL — LOW (ref 150–400)
RBC # BLD: 3.6 M/UL — LOW (ref 3.8–5.2)
RBC # FLD: 13 % — SIGNIFICANT CHANGE UP (ref 10.3–14.5)
WBC # BLD: 4.6 K/UL — SIGNIFICANT CHANGE UP (ref 3.8–10.5)
WBC # FLD AUTO: 4.6 K/UL — SIGNIFICANT CHANGE UP (ref 3.8–10.5)

## 2023-05-23 PROCEDURE — 99214 OFFICE O/P EST MOD 30 MIN: CPT

## 2023-05-24 LAB
CANCER AG125 SERPL-ACNC: 8 U/ML
CREAT SPEC-SCNC: 187 MG/DL
CREAT/PROT UR: 0.2 RATIO
PROT UR-MCNC: 29 MG/DL

## 2023-05-25 ENCOUNTER — APPOINTMENT (OUTPATIENT)
Dept: GYNECOLOGIC ONCOLOGY | Facility: CLINIC | Age: 68
End: 2023-05-25
Payer: MEDICARE

## 2023-05-25 VITALS
HEART RATE: 74 BPM | WEIGHT: 143 LBS | BODY MASS INDEX: 26.31 KG/M2 | HEIGHT: 62 IN | DIASTOLIC BLOOD PRESSURE: 79 MMHG | SYSTOLIC BLOOD PRESSURE: 149 MMHG | OXYGEN SATURATION: 98 %

## 2023-05-25 PROCEDURE — 99214 OFFICE O/P EST MOD 30 MIN: CPT

## 2023-05-25 NOTE — HISTORY OF PRESENT ILLNESS
[Disease: _____________________] : Disease: [unfilled] [Cardiovascular] : Cardiovascular [ENT] : ENT [Dermatologic] : Dermatologic [Endocrine] : Endocrine [Genitourinary] : Genitourinary [Gynecologic] : Gynecologic [Infectious] : Infectious [Pulmonary] : Pulmonary [de-identified] : The patient is a 66 yo F with h/o HTN, hyperlipidemia, GERD, decreased hearing (present since childhood), depression as well as a RAD51c gene mutation s/p prophylactic BSO in 2018 who was diagnosed with high-grade serous carcinoma found in a right side omental nodule in February 2022. She underwent 3 cylces of Carbo/Taxol/Marce followed by interval debulking including a supracervical hysterectomy and omentectomy. Surgical findings did not reveal any gross disease but the uterus was stuck to the rectum.\par \par  was initially elevated at 211 on 1/18/22\par Tumor demonstrated CK-7, PAX 8, P16, P53, WT1: Positive.  CK-20: Negative\par \par The patient was found to be HRD positive by Myriad testing. She completed an additional 3 cycles of chemotherapy and was started on bevacizumab maintenance. Olaparib was added given her HRD positive status. [de-identified] : high grade serous [de-identified] : ca125 211 [de-identified] : The patient presents for follow up while on maintenance therapy with Marce and Olaparib for her primary peritoneal carcinoma. She is taking 200mg of Olaparib and Marce Q3 weeks. She initially was on 200mg BID that was escalated to 300mg BID olaparib but required a dose reduction because of mucositis and decreased appetite with significant weight loss so dose was reduced back to 200mg BID. \par + Arthralgia/myalgia, crampy discomfort in LEs from hips to feet, remains improved with gabapentin which she uses as needed. + Fatigue, unchanged. + Mucositis, slightly worse. + Intermittent loose stool.  + Decreased appetite but weight stable. + Non-productive intermittent cough with sore "scratchy" throat, begins with taking Lynparza and last ~1-2 hours, remains unchanged. + Dysgeusia, unchanged. + Nail changes, unchanged. + Xerostomia. + Baseline xeroderma, no worse. Dyspepsia resolved. No N/V, epistaxis, fever or SOB.\par \par  [de-identified] : Arthralgia [de-identified] : fatigue [de-identified] : Neuropathy with pain in LE [de-identified] : Musculoskeletal: [TWNoteComboBox3] : Grade: 2 [de-identified] : Constitutional: [de-identified] : Grade: 1 [de-identified] : Neurologic: [de-identified] : Grade: 2

## 2023-05-25 NOTE — RESULTS/DATA
[FreeTextEntry1] : 3/24/23: CT Abdomen/pelvis \par IMPRESSION:\par No CT evidence of local tumor recurrence or metastatic disease in the chest, abdomen and pelvis\par \par 6/6/22 Debulking surgery\par Final Diagnosis\par \par 1. Omentum, partial Omentectomy ( 12 cm segment)\par \par - Omentum with adhesions and chronic inflammation\par - No atypia or malignancy identified\par \par 2. Uterus, corpus , hysterectomy\par - 35 Gram uterine corpus\par - Serosa: Adhesions and chronic inflammation\par - Cervix: Not seen, supra cervical specimen\par - Endometrium: Weakly proliferative with cystic atrophy. Endometrial polyp\par ( 1.0 cm)\par Negative for hyperplasia\par - Myometrium: Leiomyomas. Adenomyosis.\par - Negative for atypia or malignancy\par \par 3. Gastro colic ligament, excision\par - Fibrofatty tissue with scar, chronic inflammation, foreign body\par granuloma reaction\par - Negative for atypia or malignancy\par \par 5/5/22 Chest/ab/pelvis\par IMPRESSION:\par No gross evidence for carcinomatosis. 2.3 x 1.2 cm fluid density in the expected location of left adnexa this patient status post bilateral salpingo-oophorectomy. This may represent a small seroma or lymphocele. Minimal hazy soft tissue stranding in the supra colic omentum at site of previously visualized omental implant.\par \par 3/5/22 Chest CT \par IMPRESSION:\par Stable 3 mm right middle lobe nodule.\par \par Slightly larger right cardiophrenic lymphadenopathy. Stable left supradiaphragmatic lymph nodes.\par \par Partially visualized peritoneal nodularity in the left upper quadrant.\par \par \par Pathology 2/25/22 \par Accession:                             [id number]\par \par Collected Date/Time:                   2/25/2022 12:15 EST\par Received Date/Time:                    2/25/2022 12:15 EST\par \par Surgical Pathology Consultation Report - Auth (Verified)\par \par Specimen(s) Submitted\par \par 1. Slide Consult- Right omental nodule biopsy (11 slides, 29-V-,\par 1A1, PAX8, PAX8, CK7, CK7, WT1, P16, P53, WT1, P53)\par \par Final Diagnosis\par Submitting Institution:  [facility]\par \par \par Date of Procedure:  2/16/22\par \par Right omental nodule biopsy\par - Compatible with high grade serous carcinoma of adnexal or peritoneal\par origin, see note\par \par Note: In submitted immunostains, tumor cells are positive for CK7, PAX8,\par WT-1 and p16(diffuse). Tumor cells show aberrant expression(over -\par expression) for p53.\par \par 1/26/22 CT abdomen/pelvis \par multi lobular soft tissue masses involving the omentum most pronounced right side adjacent to the ascending colon and cecum. Additional nodularity adjacent to the tip of the cecum involving the peritoneum. 2.9 cm left hemipelvic cyst. Findings suspicious for ovarian neoplasia. \par

## 2023-05-25 NOTE — REVIEW OF SYSTEMS
[Fatigue] : fatigue [Constipation] : constipation [Joint Pain] : joint pain [Joint Stiffness] : joint stiffness [Muscle Pain] : muscle pain [Negative] : Allergic/Immunologic [Fever] : no fever [Chills] : no chills [Recent Change In Weight] : ~T no recent weight change [Odynophagia] : no odynophagia [Mucosal Pain] : no mucosal pain [Chest Pain] : no chest pain [Shortness Of Breath] : no shortness of breath [Abdominal Pain] : no abdominal pain [Easy Bleeding] : no tendency for easy bleeding [Easy Bruising] : no tendency for easy bruising [Swollen Glands] : no swollen glands [FreeTextEntry2] : negative except as reviewed in interval history  [FreeTextEntry4] : History of hearing deficits [de-identified] : LE neuropathy

## 2023-05-25 NOTE — PHYSICAL EXAM
[Restricted in physically strenuous activity but ambulatory and able to carry out work of a light or sedentary nature] : Status 1- Restricted in physically strenuous activity but ambulatory and able to carry out work of a light or sedentary nature, e.g., light house work, office work [Thin] : thin [Normal] : affect appropriate [Ulcers] : no ulcers [Mucositis] : no mucositis [de-identified] : Patient has bilateral hearing loss. No mucosal lesions [de-identified] : Brisk bilateral upper extremity reflexes: equal and symmetrical, LE absent bilat

## 2023-05-25 NOTE — ASSESSMENT
[Curative] : Goals of care discussed with patient: Curative [FreeTextEntry1] : # Primary Peritoneal Cancer, high grade serous, stage III, HRD positive \par - s/p 3 cycles of neoadjuvant chemotherapy with carbo/taxol/marce. Marce held at cycle 3 in anticipation of surgery. \par - s/p interval debulking surgery on 6/6/2022. Patient had optimal debulking surgery. \par - then s/p additional 3 cycles of carbo/taxol, then started on maintenance bevacizumab. \par - Patient started on olaparib, initially at dose reduction. Patient was tolerating well and dose increased to 300 mg BID. But patient then had mucositis and decreased appetite with significant weight loss so dose was reduced back to 200mg BID. Patient tolerating this dose well. \par - CBC has been stable, no anemia or neutropenia. Recent slight thrombocytopenia\par - RTC Q3 weeks for Marce with MD/PA follow up Q6 weeks \par - restaging CT on 3/17/23:  No CT evidence of local tumor recurrence or metastatic disease in the chest, abdomen and pelvis.\par \par # Supportive\par - neuropathy, grade 2, managed with gabapentin \par - constipation managed with miralax\par - fatigue is mild and stable

## 2023-05-25 NOTE — REASON FOR VISIT
[Follow-Up Visit] : a follow-up [FreeTextEntry2] : primary peritoneal cancer currently on Marce and Olaparib maintenance started 9/2022

## 2023-05-26 ENCOUNTER — APPOINTMENT (OUTPATIENT)
Age: 68
End: 2023-05-26

## 2023-06-12 ENCOUNTER — TRANSCRIPTION ENCOUNTER (OUTPATIENT)
Age: 68
End: 2023-06-12

## 2023-06-16 ENCOUNTER — RESULT REVIEW (OUTPATIENT)
Age: 68
End: 2023-06-16

## 2023-06-16 ENCOUNTER — APPOINTMENT (OUTPATIENT)
Dept: PHYSICAL MEDICINE AND REHAB | Facility: CLINIC | Age: 68
End: 2023-06-16
Payer: MEDICARE

## 2023-06-16 ENCOUNTER — APPOINTMENT (OUTPATIENT)
Age: 68
End: 2023-06-16

## 2023-06-16 VITALS
WEIGHT: 143 LBS | HEART RATE: 65 BPM | HEIGHT: 62 IN | DIASTOLIC BLOOD PRESSURE: 78 MMHG | BODY MASS INDEX: 26.31 KG/M2 | SYSTOLIC BLOOD PRESSURE: 149 MMHG

## 2023-06-16 VITALS
HEART RATE: 96 BPM | BODY MASS INDEX: 26.31 KG/M2 | SYSTOLIC BLOOD PRESSURE: 133 MMHG | DIASTOLIC BLOOD PRESSURE: 85 MMHG | HEIGHT: 62 IN | WEIGHT: 143 LBS

## 2023-06-16 LAB
ALBUMIN SERPL ELPH-MCNC: 4.4 G/DL
ALP BLD-CCNC: 68 U/L
ALT SERPL-CCNC: 16 U/L
ANION GAP SERPL CALC-SCNC: 15 MMOL/L
ANISOCYTOSIS BLD QL: SLIGHT — SIGNIFICANT CHANGE UP
AST SERPL-CCNC: 23 U/L
BASOPHILS # BLD AUTO: 0 K/UL — SIGNIFICANT CHANGE UP (ref 0–0.2)
BASOPHILS NFR BLD AUTO: 2 % — SIGNIFICANT CHANGE UP (ref 0–2)
BILIRUB SERPL-MCNC: 0.6 MG/DL
BUN SERPL-MCNC: 21 MG/DL
CALCIUM SERPL-MCNC: 9.8 MG/DL
CHLORIDE SERPL-SCNC: 100 MMOL/L
CO2 SERPL-SCNC: 25 MMOL/L
CREAT SERPL-MCNC: 0.83 MG/DL
EGFR: 78 ML/MIN/1.73M2
EOSINOPHIL # BLD AUTO: 0.1 K/UL — SIGNIFICANT CHANGE UP (ref 0–0.5)
EOSINOPHIL NFR BLD AUTO: 2 % — SIGNIFICANT CHANGE UP (ref 0–6)
GLUCOSE SERPL-MCNC: 116 MG/DL
HCT VFR BLD CALC: 36.8 % — SIGNIFICANT CHANGE UP (ref 34.5–45)
HGB BLD-MCNC: 13.4 G/DL — SIGNIFICANT CHANGE UP (ref 11.5–15.5)
LG PLATELETS BLD QL AUTO: SLIGHT — SIGNIFICANT CHANGE UP
LYMPHOCYTES # BLD AUTO: 1 K/UL — SIGNIFICANT CHANGE UP (ref 1–3.3)
LYMPHOCYTES # BLD AUTO: 32 % — SIGNIFICANT CHANGE UP (ref 13–44)
MACROCYTES BLD QL: SLIGHT — SIGNIFICANT CHANGE UP
MCHC RBC-ENTMCNC: 36.4 G/DL — HIGH (ref 32–36)
MCHC RBC-ENTMCNC: 37.4 PG — HIGH (ref 27–34)
MCV RBC AUTO: 102.8 FL — HIGH (ref 80–100)
MONOCYTES # BLD AUTO: 0.5 K/UL — SIGNIFICANT CHANGE UP (ref 0–0.9)
MONOCYTES NFR BLD AUTO: 10 % — SIGNIFICANT CHANGE UP (ref 2–14)
NEUTROPHILS # BLD AUTO: 3.3 K/UL — SIGNIFICANT CHANGE UP (ref 1.8–7.4)
NEUTROPHILS NFR BLD AUTO: 53 % — SIGNIFICANT CHANGE UP (ref 43–77)
OVALOCYTES BLD QL SMEAR: SLIGHT — SIGNIFICANT CHANGE UP
PLAT MORPH BLD: NORMAL — SIGNIFICANT CHANGE UP
PLATELET # BLD AUTO: 143 K/UL — LOW (ref 150–400)
POTASSIUM SERPL-SCNC: 4 MMOL/L
PROT SERPL-MCNC: 6.4 G/DL
RBC # BLD: 3.58 M/UL — LOW (ref 3.8–5.2)
RBC # FLD: 12.7 % — SIGNIFICANT CHANGE UP (ref 10.3–14.5)
RBC BLD AUTO: SIGNIFICANT CHANGE UP
SODIUM SERPL-SCNC: 140 MMOL/L
VARIANT LYMPHS # BLD: 1 % — SIGNIFICANT CHANGE UP (ref 0–6)
WBC # BLD: 4.9 K/UL — SIGNIFICANT CHANGE UP (ref 3.8–10.5)
WBC # FLD AUTO: 4.9 K/UL — SIGNIFICANT CHANGE UP (ref 3.8–10.5)

## 2023-06-16 PROCEDURE — 99214 OFFICE O/P EST MOD 30 MIN: CPT

## 2023-06-16 NOTE — PHYSICAL EXAM
[FreeTextEntry1] : Gen: Patient is A&O x 3, NAD\par HEENT: EOMI, hearing grossly normal\par Resp: regular, non - labored\par CV: pulses regular\par Skin: no rashes, erythema\par Lymph: no clubbing, cyanosis, edema\par Inspection: no instability \par ROM: full throughout\par Palpation:  No TTP bilateral knee joints \par Sensation: mild decrease in LE to light touch distally  \par Reflexes: 1+ and symmetric throughout\par Strength: 5/5 throughout\par Special tests: -straight leg raise, no clonus \par Gait: antalgic, wide gait \par \par

## 2023-06-16 NOTE — HISTORY OF PRESENT ILLNESS
[FreeTextEntry1] : Ms. Retana is a 67 year old female with Primary Peritoneal Cancer, high grade serous, stage III, HRD positive.  S/p 3 cycles of neoadjuvant chemotherapy with carbo/taxol/estuardo.  S/p interval debulking surgery on 6/6/2022.  Then s/p additional 3 cycles of carbo/taxol, then started on maintenance bevacizumab.  Patient started on olaparib.\par \par She reports that since increasing her gabapentin she has noticed significant improvement in her pain and function.  She reports much less cramping.  Also reports less numbness in her toes.  Did not yet start physical therapy but reports overall her mobility is improving.  Denies any focal weakness.  Denies any side effects to current medication.  Reports knee pain is also improving.

## 2023-06-16 NOTE — ASSESSMENT
[FreeTextEntry1] : 67 year old female presenting for evaluation.\par \par #Neuropathy:\par -Continue gabapentin 200mg TID-rx sent \par -CMP reviewed, Cr 0.83\par \par #B/L Knee pain:\par -Improving\par -Continue neoprene brace as needed for support/comfort \par \par #Impaired balance:\par -She would like to defer PT, given improvement in mobility  \par \par Follow up in 4 months.

## 2023-06-17 LAB
ALBUMIN SERPL ELPH-MCNC: 4 G/DL — SIGNIFICANT CHANGE UP (ref 3.3–5)
ALP SERPL-CCNC: 57 U/L — SIGNIFICANT CHANGE UP (ref 40–120)
ALT FLD-CCNC: 16 U/L — SIGNIFICANT CHANGE UP (ref 10–45)
ANION GAP SERPL CALC-SCNC: 12 MMOL/L — SIGNIFICANT CHANGE UP (ref 5–17)
APPEARANCE UR: CLEAR — SIGNIFICANT CHANGE UP
AST SERPL-CCNC: 29 U/L — SIGNIFICANT CHANGE UP (ref 10–40)
BILIRUB SERPL-MCNC: 0.5 MG/DL — SIGNIFICANT CHANGE UP (ref 0.2–1.2)
BILIRUB UR-MCNC: NEGATIVE — SIGNIFICANT CHANGE UP
BUN SERPL-MCNC: 23 MG/DL — SIGNIFICANT CHANGE UP (ref 7–23)
CALCIUM SERPL-MCNC: 9.2 MG/DL — SIGNIFICANT CHANGE UP (ref 8.4–10.5)
CHLORIDE SERPL-SCNC: 103 MMOL/L — SIGNIFICANT CHANGE UP (ref 96–108)
CO2 SERPL-SCNC: 23 MMOL/L — SIGNIFICANT CHANGE UP (ref 22–31)
COLOR SPEC: YELLOW — SIGNIFICANT CHANGE UP
CREAT SERPL-MCNC: 0.84 MG/DL — SIGNIFICANT CHANGE UP (ref 0.5–1.3)
DIFF PNL FLD: NEGATIVE — SIGNIFICANT CHANGE UP
EGFR: 77 ML/MIN/1.73M2 — SIGNIFICANT CHANGE UP
GLUCOSE SERPL-MCNC: 97 MG/DL — SIGNIFICANT CHANGE UP (ref 70–99)
GLUCOSE UR QL: NEGATIVE MG/DL — SIGNIFICANT CHANGE UP
KETONES UR-MCNC: NEGATIVE MG/DL — SIGNIFICANT CHANGE UP
LEUKOCYTE ESTERASE UR-ACNC: NEGATIVE — SIGNIFICANT CHANGE UP
NITRITE UR-MCNC: NEGATIVE — SIGNIFICANT CHANGE UP
PH UR: 6 — SIGNIFICANT CHANGE UP (ref 5–8)
POTASSIUM SERPL-MCNC: 4.8 MMOL/L — SIGNIFICANT CHANGE UP (ref 3.5–5.3)
POTASSIUM SERPL-SCNC: 4.8 MMOL/L — SIGNIFICANT CHANGE UP (ref 3.5–5.3)
PROT SERPL-MCNC: 6.5 G/DL — SIGNIFICANT CHANGE UP (ref 6–8.3)
PROT UR-MCNC: NEGATIVE MG/DL — SIGNIFICANT CHANGE UP
SODIUM SERPL-SCNC: 137 MMOL/L — SIGNIFICANT CHANGE UP (ref 135–145)
SP GR SPEC: 1.02 — SIGNIFICANT CHANGE UP (ref 1–1.03)
UROBILINOGEN FLD QL: 0.2 MG/DL — SIGNIFICANT CHANGE UP (ref 0.2–1)

## 2023-06-20 NOTE — PHYSICAL EXAM
[Chaperone Present] : A chaperone was present in the examining room during all aspects of the physical examination [Absent] : Adnexa(ae): Absent [Normal] : Recto-Vaginal Exam: Normal [FreeTextEntry1] : Xuan Shearer Medical assistant chaperoned during gynecologic exam.

## 2023-06-20 NOTE — ASSESSMENT
[FreeTextEntry1] : Patient met Dr. Iraheta, and reports that she had a good encounter. Advised patient we will continue seeing patient every 3 months. I reviewed BP which was okay. Urine test 2 weeks ago looked good no protein. Patient is feeling well from a gynecologist stand point. She reports being on Lynparza 100 mg two tabs every 12 hours. HRD positive. Will order Rescan for September. Patient reports reaction (hives 2 cm skin reaction inferior abdomen to last SEN. Otherwise patient is clinically KHUSHI and will follow up in 3-4 months or sooner if she develops any symptoms or concerns. \par

## 2023-06-20 NOTE — HISTORY OF PRESENT ILLNESS
[FreeTextEntry1] : 66 y/o patient with Stage III High Grade Serous PPC, s/p Ex-Lap Supracervical hysterectomy, omentectomy at [facility] on 6/6/22 with Dr. Jone Flores. She is currently undergoing maintenance treatment with Dr. Garces, s/p completion of systemic therapy with Carbo/Taxol on 8/25/22. Patient is currently on maintenance Bevacizumab and Lynparza. She returns to the office today for a routine svl visit. \par \par Ca125 2/10/23 was 8\par \par CT C/A/P (3/17/23) IMPRESSION:\par No CT evidence of local tumor recurrence or metastatic disease in the chest, abdomen and pelvis.\par \par Health Maintenance:\par Mammo (11/21/22) - BI-RADS 2, benign\par Colonoscopy/EDG (5/2018) - Normal colonoscopy return PRN; repeat EDG in 3 years due to polyps (one w/ minimal reactive gastropathy)

## 2023-06-20 NOTE — REASON FOR VISIT
[FreeTextEntry1] : [place] Location \par \par [facility] [place]. [phone]\par [address] [place] [address] \par \par Stage III High Grade Serous Primary peritoneal cancer\par Neoadjuvant Carbo/Taxol/Bevacizumab x 3 cycles - 3/9/22 - 4/20/22\par Interval, optimal debulking surgery - 6/6/2022\par C/T/B x 3 cycles - 7/11/22 - 8/25/22\par Maintenance Bevacizumab & Lynparza - 9/16/22 - current\par RAD mutation

## 2023-06-27 ENCOUNTER — RESULT REVIEW (OUTPATIENT)
Age: 68
End: 2023-06-27

## 2023-06-27 ENCOUNTER — APPOINTMENT (OUTPATIENT)
Dept: HEMATOLOGY ONCOLOGY | Facility: CLINIC | Age: 68
End: 2023-06-27
Payer: MEDICARE

## 2023-06-27 VITALS
DIASTOLIC BLOOD PRESSURE: 74 MMHG | HEIGHT: 62 IN | BODY MASS INDEX: 26.11 KG/M2 | OXYGEN SATURATION: 96 % | SYSTOLIC BLOOD PRESSURE: 134 MMHG | TEMPERATURE: 98.5 F | HEART RATE: 76 BPM | WEIGHT: 141.87 LBS

## 2023-06-27 LAB
BASOPHILS # BLD AUTO: 0 K/UL — SIGNIFICANT CHANGE UP (ref 0–0.2)
BASOPHILS NFR BLD AUTO: 1.1 % — SIGNIFICANT CHANGE UP (ref 0–2)
EOSINOPHIL # BLD AUTO: 0.1 K/UL — SIGNIFICANT CHANGE UP (ref 0–0.5)
EOSINOPHIL NFR BLD AUTO: 1.4 % — SIGNIFICANT CHANGE UP (ref 0–6)
HCT VFR BLD CALC: 38.1 % — SIGNIFICANT CHANGE UP (ref 34.5–45)
HGB BLD-MCNC: 13.7 G/DL — SIGNIFICANT CHANGE UP (ref 11.5–15.5)
LYMPHOCYTES # BLD AUTO: 1.1 K/UL — SIGNIFICANT CHANGE UP (ref 1–3.3)
LYMPHOCYTES # BLD AUTO: 24.8 % — SIGNIFICANT CHANGE UP (ref 13–44)
MCHC RBC-ENTMCNC: 36 G/DL — SIGNIFICANT CHANGE UP (ref 32–36)
MCHC RBC-ENTMCNC: 36 PG — HIGH (ref 27–34)
MCV RBC AUTO: 100 FL — SIGNIFICANT CHANGE UP (ref 80–100)
MONOCYTES # BLD AUTO: 0.4 K/UL — SIGNIFICANT CHANGE UP (ref 0–0.9)
MONOCYTES NFR BLD AUTO: 9.4 % — SIGNIFICANT CHANGE UP (ref 2–14)
NEUTROPHILS # BLD AUTO: 2.8 K/UL — SIGNIFICANT CHANGE UP (ref 1.8–7.4)
NEUTROPHILS NFR BLD AUTO: 63.3 % — SIGNIFICANT CHANGE UP (ref 43–77)
PLATELET # BLD AUTO: 137 K/UL — LOW (ref 150–400)
RBC # BLD: 3.81 M/UL — SIGNIFICANT CHANGE UP (ref 3.8–5.2)
RBC # FLD: 12.6 % — SIGNIFICANT CHANGE UP (ref 10.3–14.5)
WBC # BLD: 4.4 K/UL — SIGNIFICANT CHANGE UP (ref 3.8–10.5)
WBC # FLD AUTO: 4.4 K/UL — SIGNIFICANT CHANGE UP (ref 3.8–10.5)

## 2023-06-27 PROCEDURE — 99214 OFFICE O/P EST MOD 30 MIN: CPT

## 2023-06-28 LAB — CANCER AG125 SERPL-ACNC: 8 U/ML

## 2023-06-30 ENCOUNTER — OUTPATIENT (OUTPATIENT)
Dept: OUTPATIENT SERVICES | Facility: HOSPITAL | Age: 68
LOS: 1 days | Discharge: HOME | End: 2023-06-30

## 2023-06-30 DIAGNOSIS — Z98.51 TUBAL LIGATION STATUS: Chronic | ICD-10-CM

## 2023-06-30 DIAGNOSIS — Z98.890 OTHER SPECIFIED POSTPROCEDURAL STATES: Chronic | ICD-10-CM

## 2023-06-30 DIAGNOSIS — C48.2 MALIGNANT NEOPLASM OF PERITONEUM, UNSPECIFIED: ICD-10-CM

## 2023-06-30 DIAGNOSIS — Z90.722 ACQUIRED ABSENCE OF OVARIES, BILATERAL: Chronic | ICD-10-CM

## 2023-06-30 DIAGNOSIS — Z96.21 COCHLEAR IMPLANT STATUS: Chronic | ICD-10-CM

## 2023-07-01 LAB
ALBUMIN SERPL ELPH-MCNC: 4.5 G/DL
ALP BLD-CCNC: 69 U/L
ALT SERPL-CCNC: 18 U/L
ANION GAP SERPL CALC-SCNC: 13 MMOL/L
AST SERPL-CCNC: 21 U/L
BILIRUB SERPL-MCNC: 0.8 MG/DL
BUN SERPL-MCNC: 22 MG/DL
CALCIUM SERPL-MCNC: 9.6 MG/DL
CHLORIDE SERPL-SCNC: 102 MMOL/L
CO2 SERPL-SCNC: 26 MMOL/L
CREAT SERPL-MCNC: 0.89 MG/DL
EGFR: 71 ML/MIN/1.73M2
GLUCOSE SERPL-MCNC: 136 MG/DL
MAGNESIUM SERPL-MCNC: 1.7 MG/DL
POTASSIUM SERPL-SCNC: 4.1 MMOL/L
PROT SERPL-MCNC: 6.5 G/DL
SODIUM SERPL-SCNC: 141 MMOL/L

## 2023-07-07 ENCOUNTER — RESULT REVIEW (OUTPATIENT)
Age: 68
End: 2023-07-07

## 2023-07-07 ENCOUNTER — APPOINTMENT (OUTPATIENT)
Age: 68
End: 2023-07-07

## 2023-07-07 LAB
ALBUMIN SERPL ELPH-MCNC: 3.8 G/DL — SIGNIFICANT CHANGE UP (ref 3.3–5)
ALP SERPL-CCNC: 61 U/L — SIGNIFICANT CHANGE UP (ref 40–120)
ALT FLD-CCNC: 15 U/L — SIGNIFICANT CHANGE UP (ref 10–45)
ANION GAP SERPL CALC-SCNC: 12 MMOL/L — SIGNIFICANT CHANGE UP (ref 5–17)
AST SERPL-CCNC: 24 U/L — SIGNIFICANT CHANGE UP (ref 10–40)
BASOPHILS # BLD AUTO: 0 K/UL — SIGNIFICANT CHANGE UP (ref 0–0.2)
BASOPHILS NFR BLD AUTO: 0.9 % — SIGNIFICANT CHANGE UP (ref 0–2)
BILIRUB SERPL-MCNC: 0.4 MG/DL — SIGNIFICANT CHANGE UP (ref 0.2–1.2)
BUN SERPL-MCNC: 17 MG/DL — SIGNIFICANT CHANGE UP (ref 7–23)
CALCIUM SERPL-MCNC: 9 MG/DL — SIGNIFICANT CHANGE UP (ref 8.4–10.5)
CHLORIDE SERPL-SCNC: 104 MMOL/L — SIGNIFICANT CHANGE UP (ref 96–108)
CO2 SERPL-SCNC: 24 MMOL/L — SIGNIFICANT CHANGE UP (ref 22–31)
CREAT SERPL-MCNC: 0.68 MG/DL — SIGNIFICANT CHANGE UP (ref 0.5–1.3)
EGFR: 96 ML/MIN/1.73M2 — SIGNIFICANT CHANGE UP
EOSINOPHIL # BLD AUTO: 0.1 K/UL — SIGNIFICANT CHANGE UP (ref 0–0.5)
EOSINOPHIL NFR BLD AUTO: 2.3 % — SIGNIFICANT CHANGE UP (ref 0–6)
GLUCOSE SERPL-MCNC: 116 MG/DL — HIGH (ref 70–99)
HCT VFR BLD CALC: 36.6 % — SIGNIFICANT CHANGE UP (ref 34.5–45)
HGB BLD-MCNC: 13.3 G/DL — SIGNIFICANT CHANGE UP (ref 11.5–15.5)
LYMPHOCYTES # BLD AUTO: 1 K/UL — SIGNIFICANT CHANGE UP (ref 1–3.3)
LYMPHOCYTES # BLD AUTO: 26.6 % — SIGNIFICANT CHANGE UP (ref 13–44)
MCHC RBC-ENTMCNC: 36.4 G/DL — HIGH (ref 32–36)
MCHC RBC-ENTMCNC: 37.1 PG — HIGH (ref 27–34)
MCV RBC AUTO: 102 FL — HIGH (ref 80–100)
MONOCYTES # BLD AUTO: 0.4 K/UL — SIGNIFICANT CHANGE UP (ref 0–0.9)
MONOCYTES NFR BLD AUTO: 11.1 % — SIGNIFICANT CHANGE UP (ref 2–14)
NEUTROPHILS # BLD AUTO: 2.2 K/UL — SIGNIFICANT CHANGE UP (ref 1.8–7.4)
NEUTROPHILS NFR BLD AUTO: 59 % — SIGNIFICANT CHANGE UP (ref 43–77)
PLATELET # BLD AUTO: 135 K/UL — LOW (ref 150–400)
POTASSIUM SERPL-MCNC: 4 MMOL/L — SIGNIFICANT CHANGE UP (ref 3.5–5.3)
POTASSIUM SERPL-SCNC: 4 MMOL/L — SIGNIFICANT CHANGE UP (ref 3.5–5.3)
PROT SERPL-MCNC: 6.3 G/DL — SIGNIFICANT CHANGE UP (ref 6–8.3)
RBC # BLD: 3.59 M/UL — LOW (ref 3.8–5.2)
RBC # FLD: 12.8 % — SIGNIFICANT CHANGE UP (ref 10.3–14.5)
SODIUM SERPL-SCNC: 141 MMOL/L — SIGNIFICANT CHANGE UP (ref 135–145)
WBC # BLD: 3.8 K/UL — SIGNIFICANT CHANGE UP (ref 3.8–10.5)
WBC # FLD AUTO: 3.8 K/UL — SIGNIFICANT CHANGE UP (ref 3.8–10.5)

## 2023-07-08 LAB
APPEARANCE UR: CLEAR — SIGNIFICANT CHANGE UP
BILIRUB UR-MCNC: NEGATIVE — SIGNIFICANT CHANGE UP
COLOR SPEC: YELLOW — SIGNIFICANT CHANGE UP
CREAT ?TM UR-MCNC: 129 MG/DL — SIGNIFICANT CHANGE UP
DIFF PNL FLD: NEGATIVE — SIGNIFICANT CHANGE UP
GLUCOSE UR QL: NEGATIVE MG/DL — SIGNIFICANT CHANGE UP
KETONES UR-MCNC: NEGATIVE MG/DL — SIGNIFICANT CHANGE UP
LEUKOCYTE ESTERASE UR-ACNC: NEGATIVE — SIGNIFICANT CHANGE UP
NITRITE UR-MCNC: NEGATIVE — SIGNIFICANT CHANGE UP
PH UR: 6.5 — SIGNIFICANT CHANGE UP (ref 5–8)
PROT ?TM UR-MCNC: 21 MG/DL — HIGH (ref 0–12)
PROT UR-MCNC: SIGNIFICANT CHANGE UP MG/DL
PROT/CREAT UR-RTO: 0.2 RATIO — SIGNIFICANT CHANGE UP (ref 0–0.2)
SP GR SPEC: 1.02 — SIGNIFICANT CHANGE UP (ref 1–1.03)
UROBILINOGEN FLD QL: 1 MG/DL — SIGNIFICANT CHANGE UP (ref 0.2–1)

## 2023-07-10 DIAGNOSIS — R11.2 NAUSEA WITH VOMITING, UNSPECIFIED: ICD-10-CM

## 2023-07-10 DIAGNOSIS — Z51.11 ENCOUNTER FOR ANTINEOPLASTIC CHEMOTHERAPY: ICD-10-CM

## 2023-07-19 NOTE — ADDENDUM
[FreeTextEntry1] : Documented by Sola Campoverde acting as scribe for Dr. Iraheta on 06/27/2023.\par \par All Medical record entries made by the Scribe were at my, Dr. Iraheta, direction and personally dictated by me on 06/27/2023. I have reviewed the chart and agree that the record accurately reflects my personal performance of the history, physical exam, assessment and plan. I have also personally directed, reviewed, and agreed with the discharge instructions.

## 2023-07-19 NOTE — RESULTS/DATA
[FreeTextEntry1] : 3/24/23: CT Abdomen/pelvis \par IMPRESSION:\par No CT evidence of local tumor recurrence or metastatic disease in the chest, abdomen and pelvis\par \par 6/6/22 Debulking surgery\par Final Diagnosis\par \par 1. Omentum, partial Omentectomy ( 12 cm segment)\par \par - Omentum with adhesions and chronic inflammation\par - No atypia or malignancy identified\par \par 2. Uterus, corpus , hysterectomy\par - 35 Gram uterine corpus\par - Serosa: Adhesions and chronic inflammation\par - Cervix: Not seen, supra cervical specimen\par - Endometrium: Weakly proliferative with cystic atrophy. Endometrial polyp\par ( 1.0 cm)\par Negative for hyperplasia\par - Myometrium: Leiomyomas. Adenomyosis.\par - Negative for atypia or malignancy\par \par 3. Gastro colic ligament, excision\par - Fibrofatty tissue with scar, chronic inflammation, foreign body\par granuloma reaction\par - Negative for atypia or malignancy\par \par 5/5/22 Chest/ab/pelvis\par IMPRESSION:\par No gross evidence for carcinomatosis. 2.3 x 1.2 cm fluid density in the expected location of left adnexa this patient status post bilateral salpingo-oophorectomy. This may represent a small seroma or lymphocele. Minimal hazy soft tissue stranding in the supra colic omentum at site of previously visualized omental implant.\par \par 3/5/22 Chest CT \par IMPRESSION:\par Stable 3 mm right middle lobe nodule.\par \par Slightly larger right cardiophrenic lymphadenopathy. Stable left supradiaphragmatic lymph nodes.\par \par Partially visualized peritoneal nodularity in the left upper quadrant.\par \par \par Pathology 2/25/22 \par Accession:                             [id number]\par \par Collected Date/Time:                   2/25/2022 12:15 EST\par Received Date/Time:                    2/25/2022 12:15 EST\par \par Surgical Pathology Consultation Report - Auth (Verified)\par \par Specimen(s) Submitted\par \par 1. Slide Consult- Right omental nodule biopsy (11 slides, 06-Q-,\par 1A1, PAX8, PAX8, CK7, CK7, WT1, P16, P53, WT1, P53)\par \par Final Diagnosis\par Submitting Institution:  [facility]\par \par \par Date of Procedure:  2/16/22\par \par Right omental nodule biopsy\par - Compatible with high grade serous carcinoma of adnexal or peritoneal\par origin, see note\par \par Note: In submitted immunostains, tumor cells are positive for CK7, PAX8,\par WT-1 and p16(diffuse). Tumor cells show aberrant expression(over -\par expression) for p53.\par \par 1/26/22 CT abdomen/pelvis \par multi lobular soft tissue masses involving the omentum most pronounced right side adjacent to the ascending colon and cecum. Additional nodularity adjacent to the tip of the cecum involving the peritoneum. 2.9 cm left hemipelvic cyst. Findings suspicious for ovarian neoplasia. \par

## 2023-07-19 NOTE — HISTORY OF PRESENT ILLNESS
[de-identified] : The patient is a 66 yo F with h/o HTN, hyperlipidemia, GERD, decreased hearing (present since childhood), depression as well as a RAD51c gene mutation s/p prophylactic BSO in 2018 who was diagnosed with high-grade serous carcinoma found in a right side omental nodule in February 2022. She underwent 3 cylces of Carbo/Taxol/Marce followed by interval debulking including a supracervical hysterectomy and omentectomy. Surgical findings did not reveal any gross disease but the uterus was stuck to the rectum.\par \par  was initially elevated at 211 on 1/18/22\par Tumor demonstrated CK-7, PAX 8, P16, P53, WT1: Positive.  CK-20: Negative\par \par The patient was found to be HRD positive by Myriad testing. She completed an additional 3 cycles of chemotherapy and was started on bevacizumab maintenance. Olaparib was added given her HRD positive status. [de-identified] : high grade serous [de-identified] : ca125 211 [de-identified] : The patient presents for follow up while on maintenance therapy with Marce and Olaparib for her primary peritoneal carcinoma. She is taking 200mg of Olaparib and Marce Q3 weeks. She initially was on 200mg BID that was escalated to 300mg BID olaparib but required a dose reduction because of mucositis and decreased appetite with significant weight loss so dose was reduced back to 200mg BID. \par + Fatigue, improved. + Mucositis, improved. + Mild nausea and increased belching. + Intermittent loose stool, improved.  + Decreased appetite but weight stable. + Non-productive intermittent cough with sore "scratchy" throat, begins with taking Lynparza and last ~1-2 hours, remains but less frequent. + Dysgeusia, unchanged. + Nail changes, improved. + Xerostomia, unchanged. + Baseline xeroderma, no worse.  Arthralgia/myalgia, crampy discomfort in LEs from hips to feet nearly resolved. Dyspepsia resolved. No epistaxis, fevers, chills or SOB.\par \par  [de-identified] : Arthralgia [de-identified] : fatigue [de-identified] : Neuropathy with pain in LE [de-identified] : Musculoskeletal: [TWNoteComboBox3] : Grade: 2 [de-identified] : Constitutional: [de-identified] : Grade: 1 [de-identified] : Neurologic: [de-identified] : Grade: 2

## 2023-07-19 NOTE — REVIEW OF SYSTEMS
[Fever] : no fever [Chills] : no chills [Recent Change In Weight] : ~T no recent weight change [Odynophagia] : no odynophagia [Mucosal Pain] : no mucosal pain [Chest Pain] : no chest pain [Shortness Of Breath] : no shortness of breath [Abdominal Pain] : no abdominal pain [Easy Bleeding] : no tendency for easy bleeding [Easy Bruising] : no tendency for easy bruising [Swollen Glands] : no swollen glands [FreeTextEntry2] : negative except as reviewed in interval history  [FreeTextEntry4] : History of hearing deficits [de-identified] : LE neuropathy

## 2023-07-19 NOTE — PHYSICAL EXAM
[Ulcers] : no ulcers [Mucositis] : no mucositis [de-identified] : Patient has bilateral hearing loss. No mucosal lesions [de-identified] : Brisk bilateral upper extremity reflexes: equal and symmetrical, LE absent bilat

## 2023-07-19 NOTE — REASON FOR VISIT
[FreeTextEntry2] : primary peritoneal cancer currently on Marce and Olaparib maintenance started 9/2022

## 2023-07-19 NOTE — ASSESSMENT
[FreeTextEntry1] : # Primary Peritoneal Cancer, high grade serous, stage III, HRD positive \par - s/p 3 cycles of neoadjuvant chemotherapy with carbo/taxol/marce. Marce held at cycle 3 in anticipation of surgery. \par - s/p interval debulking surgery on 6/6/2022. Patient had optimal debulking surgery. \par - then s/p additional 3 cycles of carbo/taxol, then started on maintenance bevacizumab. \par - Patient started on olaparib, initially at dose reduction. Patient was tolerating well and dose increased to 300 mg BID. But patient then had mucositis and decreased appetite with significant weight loss so dose was reduced back to 200mg BID. Patient tolerating this dose well. \par - CBC has been stable, no anemia or neutropenia. Recent slight thrombocytopenia\par - RTC Q3 weeks for Marce with MD/PA follow up Q6 weeks \par - restaging CT on 3/17/23:  No CT evidence of local tumor recurrence or metastatic disease in the chest, abdomen and pelvis.\par - next CT in early September\par \par # Supportive\par - neuropathy, grade 2, managed with gabapentin \par - constipation managed with miralax\par - fatigue is mild and stable

## 2023-07-27 ENCOUNTER — RESULT REVIEW (OUTPATIENT)
Age: 68
End: 2023-07-27

## 2023-07-27 ENCOUNTER — APPOINTMENT (OUTPATIENT)
Dept: HEMATOLOGY ONCOLOGY | Facility: CLINIC | Age: 68
End: 2023-07-27
Payer: MEDICARE

## 2023-07-27 ENCOUNTER — LABORATORY RESULT (OUTPATIENT)
Age: 68
End: 2023-07-27

## 2023-07-27 VITALS
SYSTOLIC BLOOD PRESSURE: 113 MMHG | TEMPERATURE: 98.3 F | HEIGHT: 62 IN | BODY MASS INDEX: 25.9 KG/M2 | DIASTOLIC BLOOD PRESSURE: 68 MMHG | HEART RATE: 75 BPM | OXYGEN SATURATION: 95 % | WEIGHT: 140.77 LBS

## 2023-07-27 LAB
BASOPHILS # BLD AUTO: 0 K/UL — SIGNIFICANT CHANGE UP (ref 0–0.2)
BASOPHILS NFR BLD AUTO: 0.8 % — SIGNIFICANT CHANGE UP (ref 0–2)
EOSINOPHIL # BLD AUTO: 0 K/UL — SIGNIFICANT CHANGE UP (ref 0–0.5)
EOSINOPHIL NFR BLD AUTO: 1.3 % — SIGNIFICANT CHANGE UP (ref 0–6)
HCT VFR BLD CALC: 36.8 % — SIGNIFICANT CHANGE UP (ref 34.5–45)
HGB BLD-MCNC: 12.7 G/DL — SIGNIFICANT CHANGE UP (ref 11.5–15.5)
LYMPHOCYTES # BLD AUTO: 1.1 K/UL — SIGNIFICANT CHANGE UP (ref 1–3.3)
LYMPHOCYTES # BLD AUTO: 30.7 % — SIGNIFICANT CHANGE UP (ref 13–44)
MCHC RBC-ENTMCNC: 34.6 G/DL — SIGNIFICANT CHANGE UP (ref 32–36)
MCHC RBC-ENTMCNC: 36.1 PG — HIGH (ref 27–34)
MCV RBC AUTO: 104.3 FL — HIGH (ref 80–100)
MONOCYTES # BLD AUTO: 0.4 K/UL — SIGNIFICANT CHANGE UP (ref 0–0.9)
MONOCYTES NFR BLD AUTO: 11 % — SIGNIFICANT CHANGE UP (ref 2–14)
NEUTROPHILS # BLD AUTO: 2.1 K/UL — SIGNIFICANT CHANGE UP (ref 1.8–7.4)
NEUTROPHILS NFR BLD AUTO: 56.2 % — SIGNIFICANT CHANGE UP (ref 43–77)
PLATELET # BLD AUTO: 160 K/UL — SIGNIFICANT CHANGE UP (ref 150–400)
RBC # BLD: 3.52 M/UL — LOW (ref 3.8–5.2)
RBC # FLD: 12.3 % — SIGNIFICANT CHANGE UP (ref 10.3–14.5)
WBC # BLD: 3.7 K/UL — LOW (ref 3.8–10.5)
WBC # FLD AUTO: 3.7 K/UL — LOW (ref 3.8–10.5)

## 2023-07-27 PROCEDURE — 99214 OFFICE O/P EST MOD 30 MIN: CPT

## 2023-07-28 ENCOUNTER — APPOINTMENT (OUTPATIENT)
Age: 68
End: 2023-07-28

## 2023-07-28 ENCOUNTER — RESULT REVIEW (OUTPATIENT)
Age: 68
End: 2023-07-28

## 2023-07-28 LAB
ALBUMIN SERPL ELPH-MCNC: 4.3 G/DL
ALP BLD-CCNC: 63 U/L
ALT SERPL-CCNC: 16 U/L
ANION GAP SERPL CALC-SCNC: 12 MMOL/L
APPEARANCE UR: CLEAR — SIGNIFICANT CHANGE UP
APPEARANCE: CLEAR
AST SERPL-CCNC: 23 U/L
BILIRUB SERPL-MCNC: 0.5 MG/DL
BILIRUB UR-MCNC: NEGATIVE — SIGNIFICANT CHANGE UP
BILIRUBIN URINE: NEGATIVE
BLOOD URINE: NEGATIVE
BUN SERPL-MCNC: 19 MG/DL
CALCIUM SERPL-MCNC: 9.2 MG/DL
CANCER AG125 SERPL-ACNC: 8 U/ML
CHLORIDE SERPL-SCNC: 104 MMOL/L
CO2 SERPL-SCNC: 26 MMOL/L
COLOR SPEC: YELLOW — SIGNIFICANT CHANGE UP
COLOR: NORMAL
CREAT SERPL-MCNC: 0.75 MG/DL
DIFF PNL FLD: NEGATIVE — SIGNIFICANT CHANGE UP
EGFR: 87 ML/MIN/1.73M2
GLUCOSE QUALITATIVE U: NEGATIVE MG/DL
GLUCOSE SERPL-MCNC: 112 MG/DL
GLUCOSE UR QL: NEGATIVE MG/DL — SIGNIFICANT CHANGE UP
KETONES UR-MCNC: ABNORMAL MG/DL
KETONES URINE: NEGATIVE MG/DL
LEUKOCYTE ESTERASE UR-ACNC: ABNORMAL
LEUKOCYTE ESTERASE URINE: ABNORMAL
MAGNESIUM SERPL-MCNC: 1.8 MG/DL
NITRITE UR-MCNC: NEGATIVE — SIGNIFICANT CHANGE UP
NITRITE URINE: NEGATIVE
PH UR: 6 — SIGNIFICANT CHANGE UP (ref 5–8)
PH URINE: 5
POTASSIUM SERPL-SCNC: 4 MMOL/L
PROT SERPL-MCNC: 6.2 G/DL
PROT UR-MCNC: SIGNIFICANT CHANGE UP MG/DL
PROTEIN URINE: 30 MG/DL
SODIUM SERPL-SCNC: 143 MMOL/L
SP GR SPEC: 1.03 — SIGNIFICANT CHANGE UP (ref 1–1.03)
SPECIFIC GRAVITY URINE: 1.03
UROBILINOGEN FLD QL: 1 MG/DL — SIGNIFICANT CHANGE UP (ref 0.2–1)
UROBILINOGEN URINE: 1 MG/DL

## 2023-07-29 LAB
BACTERIA # UR AUTO: NEGATIVE /HPF — SIGNIFICANT CHANGE UP
CAST: 0 /LPF — SIGNIFICANT CHANGE UP (ref 0–4)
RBC CASTS # UR COMP ASSIST: SIGNIFICANT CHANGE UP /HPF
REVIEW: SIGNIFICANT CHANGE UP
SQUAMOUS # UR AUTO: 0 /HPF — SIGNIFICANT CHANGE UP (ref 0–5)
WBC UR QL: 0 /HPF — SIGNIFICANT CHANGE UP (ref 0–5)

## 2023-08-18 ENCOUNTER — RESULT REVIEW (OUTPATIENT)
Age: 68
End: 2023-08-18

## 2023-08-18 ENCOUNTER — APPOINTMENT (OUTPATIENT)
Dept: HEMATOLOGY ONCOLOGY | Facility: CLINIC | Age: 68
End: 2023-08-18
Payer: MEDICARE

## 2023-08-18 ENCOUNTER — LABORATORY RESULT (OUTPATIENT)
Age: 68
End: 2023-08-18

## 2023-08-18 ENCOUNTER — APPOINTMENT (OUTPATIENT)
Age: 68
End: 2023-08-18

## 2023-08-18 VITALS
HEIGHT: 62 IN | SYSTOLIC BLOOD PRESSURE: 134 MMHG | HEART RATE: 59 BPM | TEMPERATURE: 97.6 F | WEIGHT: 139.99 LBS | OXYGEN SATURATION: 95 % | BODY MASS INDEX: 25.76 KG/M2 | DIASTOLIC BLOOD PRESSURE: 64 MMHG

## 2023-08-18 LAB
ALBUMIN SERPL ELPH-MCNC: 4.1 G/DL — SIGNIFICANT CHANGE UP (ref 3.3–5)
ALP SERPL-CCNC: 60 U/L — SIGNIFICANT CHANGE UP (ref 40–120)
ALT FLD-CCNC: 15 U/L — SIGNIFICANT CHANGE UP (ref 10–45)
ANION GAP SERPL CALC-SCNC: 13 MMOL/L — SIGNIFICANT CHANGE UP (ref 5–17)
AST SERPL-CCNC: 24 U/L — SIGNIFICANT CHANGE UP (ref 10–40)
BASOPHILS # BLD AUTO: 0 K/UL — SIGNIFICANT CHANGE UP (ref 0–0.2)
BASOPHILS NFR BLD AUTO: 1 % — SIGNIFICANT CHANGE UP (ref 0–2)
BILIRUB SERPL-MCNC: 0.8 MG/DL — SIGNIFICANT CHANGE UP (ref 0.2–1.2)
BUN SERPL-MCNC: 17 MG/DL — SIGNIFICANT CHANGE UP (ref 7–23)
CALCIUM SERPL-MCNC: 9.5 MG/DL — SIGNIFICANT CHANGE UP (ref 8.4–10.5)
CHLORIDE SERPL-SCNC: 103 MMOL/L — SIGNIFICANT CHANGE UP (ref 96–108)
CO2 SERPL-SCNC: 22 MMOL/L — SIGNIFICANT CHANGE UP (ref 22–31)
CREAT SERPL-MCNC: 0.68 MG/DL — SIGNIFICANT CHANGE UP (ref 0.5–1.3)
EGFR: 95 ML/MIN/1.73M2 — SIGNIFICANT CHANGE UP
EOSINOPHIL # BLD AUTO: 0.1 K/UL — SIGNIFICANT CHANGE UP (ref 0–0.5)
EOSINOPHIL NFR BLD AUTO: 1.6 % — SIGNIFICANT CHANGE UP (ref 0–6)
GLUCOSE SERPL-MCNC: 114 MG/DL — HIGH (ref 70–99)
HCT VFR BLD CALC: 36.9 % — SIGNIFICANT CHANGE UP (ref 34.5–45)
HGB BLD-MCNC: 13.5 G/DL — SIGNIFICANT CHANGE UP (ref 11.5–15.5)
LYMPHOCYTES # BLD AUTO: 1 K/UL — SIGNIFICANT CHANGE UP (ref 1–3.3)
LYMPHOCYTES # BLD AUTO: 24 % — SIGNIFICANT CHANGE UP (ref 13–44)
MAGNESIUM SERPL-MCNC: 1.8 MG/DL — SIGNIFICANT CHANGE UP (ref 1.6–2.6)
MCHC RBC-ENTMCNC: 36.6 G/DL — HIGH (ref 32–36)
MCHC RBC-ENTMCNC: 36.8 PG — HIGH (ref 27–34)
MCV RBC AUTO: 100.5 FL — HIGH (ref 80–100)
MONOCYTES # BLD AUTO: 0.5 K/UL — SIGNIFICANT CHANGE UP (ref 0–0.9)
MONOCYTES NFR BLD AUTO: 10.8 % — SIGNIFICANT CHANGE UP (ref 2–14)
NEUTROPHILS # BLD AUTO: 2.6 K/UL — SIGNIFICANT CHANGE UP (ref 1.8–7.4)
NEUTROPHILS NFR BLD AUTO: 62.6 % — SIGNIFICANT CHANGE UP (ref 43–77)
PLATELET # BLD AUTO: 150 K/UL — SIGNIFICANT CHANGE UP (ref 150–400)
POTASSIUM SERPL-MCNC: 4.4 MMOL/L — SIGNIFICANT CHANGE UP (ref 3.5–5.3)
POTASSIUM SERPL-SCNC: 4.4 MMOL/L — SIGNIFICANT CHANGE UP (ref 3.5–5.3)
PROT SERPL-MCNC: 6.6 G/DL — SIGNIFICANT CHANGE UP (ref 6–8.3)
RBC # BLD: 3.67 M/UL — LOW (ref 3.8–5.2)
RBC # FLD: 12.4 % — SIGNIFICANT CHANGE UP (ref 10.3–14.5)
SODIUM SERPL-SCNC: 138 MMOL/L — SIGNIFICANT CHANGE UP (ref 135–145)
WBC # BLD: 4.2 K/UL — SIGNIFICANT CHANGE UP (ref 3.8–10.5)
WBC # FLD AUTO: 4.2 K/UL — SIGNIFICANT CHANGE UP (ref 3.8–10.5)

## 2023-08-18 PROCEDURE — 99214 OFFICE O/P EST MOD 30 MIN: CPT

## 2023-08-18 NOTE — ASSESSMENT
[FreeTextEntry1] : # Primary Peritoneal Cancer, high grade serous, stage III, HRD positive  - s/p 3 cycles of neoadjuvant chemotherapy with carbo/taxol/marce. Marce held at cycle 3 in anticipation of surgery.  - s/p interval debulking surgery on 6/6/2022. Patient had optimal debulking surgery.  - then s/p additional 3 cycles of carbo/taxol, then started on maintenance bevacizumab.  - Patient started on olaparib, initially at dose reduction. Patient was tolerating well and dose increased to 300 mg BID. But patient then had mucositis and decreased appetite with significant weight loss so dose was reduced back to 200mg BID. Patient tolerating this dose well.  - CBC has been stable, no anemia or neutropenia. Recent slight thrombocytopenia - tolerating relatively well, side effects stable  - RTC Q3 weeks for Marce with MD/PA follow up Q6 weeks  - restaging CT on 3/17/23:  No CT evidence of local tumor recurrence or metastatic disease in the chest, abdomen and pelvis. - next CT in early September   [Curative] : Goals of care discussed with patient: Curative

## 2023-08-18 NOTE — REVIEW OF SYSTEMS
[Fatigue] : fatigue [Constipation] : constipation [Joint Pain] : joint pain [Joint Stiffness] : joint stiffness [Muscle Pain] : muscle pain [Negative] : Allergic/Immunologic [Fever] : no fever [Chills] : no chills [Recent Change In Weight] : ~T no recent weight change [Odynophagia] : no odynophagia [Mucosal Pain] : no mucosal pain [Chest Pain] : no chest pain [Shortness Of Breath] : no shortness of breath [Abdominal Pain] : no abdominal pain [Easy Bleeding] : no tendency for easy bleeding [Easy Bruising] : no tendency for easy bruising [Swollen Glands] : no swollen glands [FreeTextEntry2] : negative except as reviewed in interval history  [FreeTextEntry4] : History of hearing deficits [de-identified] : LE neuropathy

## 2023-08-18 NOTE — PHYSICAL EXAM
[Restricted in physically strenuous activity but ambulatory and able to carry out work of a light or sedentary nature] : Status 1- Restricted in physically strenuous activity but ambulatory and able to carry out work of a light or sedentary nature, e.g., light house work, office work [Thin] : thin [Ulcers] : no ulcers [Mucositis] : no mucositis [Normal] : affect appropriate [de-identified] : Patient has bilateral hearing loss. No mucosal lesions [de-identified] : Brisk bilateral upper extremity reflexes: equal and symmetrical, LE absent bilat

## 2023-08-18 NOTE — HISTORY OF PRESENT ILLNESS
[Disease: _____________________] : Disease: [unfilled] [Cardiovascular] : Cardiovascular [ENT] : ENT [Dermatologic] : Dermatologic [Endocrine] : Endocrine [Genitourinary] : Genitourinary [Gynecologic] : Gynecologic [Infectious] : Infectious [Pulmonary] : Pulmonary [de-identified] : The patient is a 66 yo F with h/o HTN, hyperlipidemia, GERD, decreased hearing (present since childhood), depression as well as a RAD51c gene mutation s/p prophylactic BSO in 2018 who was diagnosed with high-grade serous carcinoma found in a right side omental nodule in February 2022. She underwent 3 cylces of Carbo/Taxol/Marce followed by interval debulking including a supracervical hysterectomy and omentectomy. Surgical findings did not reveal any gross disease but the uterus was stuck to the rectum.\par \par  was initially elevated at 211 on 1/18/22\par Tumor demonstrated CK-7, PAX 8, P16, P53, WT1: Positive.  CK-20: Negative\par \par The patient was found to be HRD positive by [employer] testing. She completed an additional 3 cycles of chemotherapy and was started on bevacizumab maintenance. Olaparib was added given her HRD positive status. [de-identified] : high grade serous [de-identified] : ca125 211 [de-identified] : The patient presents for follow up while on maintenance therapy with Marce and Olaparib for her primary peritoneal carcinoma. She is taking 200mg of Olaparib and Marce Q3 weeks. She initially was on 200mg BID that was escalated to 300mg BID olaparib but required a dose reduction because of mucositis and decreased appetite with significant weight loss so dose was reduced back to 200mg BID.  + Fatigue, improved. + Knee pain and plantar foot pain. + Mild nausea and increased belching, intermittent. + Intermittent loose stool, unchanged. + Dysgeusia and decreased appetite but weight is stable. + Non-productive intermittent cough with sore "scratchy" throat, Qam. + Insomnia. + Xerostomia, unchanged. + Baseline xeroderma, no worse.  Dyspepsia resolved. No other arthralgia/myalgia. No epistaxis, fevers, chills or SOB. Mucositis, resolved. Nail changes resolved.   [de-identified] : Arthralgia [de-identified] : fatigue [de-identified] : Neuropathy with pain in LE [de-identified] : Musculoskeletal: [TWNoteComboBox3] : Grade: 2 [de-identified] : Constitutional: [de-identified] : Grade: 1 [de-identified] : Neurologic: [de-identified] : Grade: 2

## 2023-08-18 NOTE — ASSESSMENT
[Curative] : Goals of care discussed with patient: Curative [FreeTextEntry1] : # Primary Peritoneal Cancer, high grade serous, stage III, HRD positive \par - s/p 3 cycles of neoadjuvant chemotherapy with carbo/taxol/marce. Marce held at cycle 3 in anticipation of surgery. \par - s/p interval debulking surgery on 6/6/2022. Patient had optimal debulking surgery. \par - then s/p additional 3 cycles of carbo/taxol, then started on maintenance bevacizumab. \par - Patient started on olaparib, initially at dose reduction. Patient was tolerating well and dose increased to 300 mg BID. But patient then had mucositis and decreased appetite with significant weight loss so dose was reduced back to 200mg BID. Patient tolerating this dose well. \par - CBC has been stable, no anemia or neutropenia. Recent slight thrombocytopenia\par - RTC Q3 weeks for Marce with MD/PA follow up Q6 weeks \par - restaging CT on 3/17/23:  No CT evidence of local tumor recurrence or metastatic disease in the chest, abdomen and pelvis.\par - next CT in early September\par \par

## 2023-08-18 NOTE — REVIEW OF SYSTEMS
[Fever] : no fever [Chills] : no chills [Fatigue] : fatigue [Recent Change In Weight] : ~T no recent weight change [Odynophagia] : no odynophagia [Mucosal Pain] : no mucosal pain [Chest Pain] : no chest pain [Shortness Of Breath] : no shortness of breath [Abdominal Pain] : no abdominal pain [Constipation] : constipation [Joint Pain] : joint pain [Joint Stiffness] : joint stiffness [Muscle Pain] : muscle pain [Easy Bleeding] : no tendency for easy bleeding [Easy Bruising] : no tendency for easy bruising [Swollen Glands] : no swollen glands [Negative] : Allergic/Immunologic [FreeTextEntry2] : negative except as reviewed in interval history  [FreeTextEntry4] : History of hearing deficits [de-identified] : LE neuropathy

## 2023-08-18 NOTE — RESULTS/DATA
[FreeTextEntry1] : 3/24/23: CT Abdomen/pelvis \par  IMPRESSION:\par  No CT evidence of local tumor recurrence or metastatic disease in the chest, abdomen and pelvis\par  \par  6/6/22 Debulking surgery\par  Final Diagnosis\par  \par  1. Omentum, partial Omentectomy ( 12 cm segment)\par  \par  - Omentum with adhesions and chronic inflammation\par  - No atypia or malignancy identified\par  \par  2. Uterus, corpus , hysterectomy\par  - 35 Gram uterine corpus\par  - Serosa: Adhesions and chronic inflammation\par  - Cervix: Not seen, supra cervical specimen\par  - Endometrium: Weakly proliferative with cystic atrophy. Endometrial polyp\par  ( 1.0 cm)\par  Negative for hyperplasia\par  - Myometrium: Leiomyomas. Adenomyosis.\par  - Negative for atypia or malignancy\par  \par  3. Gastro colic ligament, excision\par  - Fibrofatty tissue with scar, chronic inflammation, foreign body\par  granuloma reaction\par  - Negative for atypia or malignancy\par  \par  5/5/22 Chest/ab/pelvis\par  IMPRESSION:\par  No gross evidence for carcinomatosis. 2.3 x 1.2 cm fluid density in the expected location of left adnexa this patient status post bilateral salpingo-oophorectomy. This may represent a small seroma or lymphocele. Minimal hazy soft tissue stranding in the supra colic omentum at site of previously visualized omental implant.\par  \par  3/5/22 Chest CT \par  IMPRESSION:\par  Stable 3 mm right middle lobe nodule.\par  \par  Slightly larger right cardiophrenic lymphadenopathy. Stable left supradiaphragmatic lymph nodes.\par  \par  Partially visualized peritoneal nodularity in the left upper quadrant.\par  \par  \par  Pathology 2/25/22 \par  Accession:                             [id number]\par  \par  Collected Date/Time:                   2/25/2022 12:15 EST\par  Received Date/Time:                    2/25/2022 12:15 EST\par  \par  Surgical Pathology Consultation Report - Auth (Verified)\par  \par  Specimen(s) Submitted\par  \par  1. Slide Consult- Right omental nodule biopsy (11 slides, 96-T-,\par  1A1, PAX8, PAX8, CK7, CK7, WT1, P16, P53, WT1, P53)\par  \par  Final Diagnosis\par  Submitting Institution:  [facility]\par  \par  \par  Date of Procedure:  2/16/22\par  \par  Right omental nodule biopsy\par  - Compatible with high grade serous carcinoma of adnexal or peritoneal\par  origin, see note\par  \par  Note: In submitted immunostains, tumor cells are positive for CK7, PAX8,\par  WT-1 and p16(diffuse). Tumor cells show aberrant expression(over -\par  expression) for p53.\par  \par  1/26/22 CT abdomen/pelvis \par  multi lobular soft tissue masses involving the omentum most pronounced right side adjacent to the ascending colon and cecum. Additional nodularity adjacent to the tip of the cecum involving the peritoneum. 2.9 cm left hemipelvic cyst. Findings suspicious for ovarian neoplasia. \par

## 2023-08-18 NOTE — HISTORY OF PRESENT ILLNESS
[Disease: _____________________] : Disease: [unfilled] [de-identified] : The patient is a 68 yo F with h/o HTN, hyperlipidemia, GERD, decreased hearing (present since childhood), depression as well as a RAD51c gene mutation s/p prophylactic BSO in 2018 who was diagnosed with high-grade serous carcinoma found in a right side omental nodule in February 2022. She underwent 3 cylces of Carbo/Taxol/Marce followed by interval debulking including a supracervical hysterectomy and omentectomy. Surgical findings did not reveal any gross disease but the uterus was stuck to the rectum.\par  \par   was initially elevated at 211 on 1/18/22\par  Tumor demonstrated CK-7, PAX 8, P16, P53, WT1: Positive.  CK-20: Negative\par  \par  The patient was found to be HRD positive by [facility] testing. She completed an additional 3 cycles of chemotherapy and was started on bevacizumab maintenance. Olaparib was added given her HRD positive status. [de-identified] : high grade serous [de-identified] : ca125 211 [de-identified] : The patient presents for follow up while on maintenance therapy with Marce and Olaparib for her primary peritoneal carcinoma. She is taking 200mg of Olaparib and Marce Q3 weeks. She initially was on 200mg BID that was escalated to 300mg BID olaparib but required a dose reduction because of mucositis and decreased appetite with significant weight loss so dose was reduced back to 200mg BID.  + Fatigue/weakness, continues improving. + Knee pain and foot pain, unchanged. + Mild nausea and increased belching, intermittent. + Intermittent loose stool, unchanged.  + Dysgeusia and decreased appetite but weight is stable. + Insomnia. + Xerostomia, unchanged. + Baseline xeroderma, no worse.  Non-productive intermittent cough with sore "scratchy" throat, currently resolved. Dyspepsia resolved. No epistaxis, fevers, chills or SOB. Mucositis, resolved. Nail changes resolved.   [Cardiovascular] : Cardiovascular [ENT] : ENT [Dermatologic] : Dermatologic [Endocrine] : Endocrine [Genitourinary] : Genitourinary [Gynecologic] : Gynecologic [Infectious] : Infectious [Pulmonary] : Pulmonary [de-identified] : Arthralgia [de-identified] : fatigue [de-identified] : Neuropathy with pain in LE [de-identified] : Musculoskeletal: [TWNoteComboBox3] : Grade: 2 [de-identified] : Constitutional: [de-identified] : Grade: 1 [de-identified] : Neurologic: [de-identified] : Grade: 2

## 2023-08-18 NOTE — PHYSICAL EXAM
[Restricted in physically strenuous activity but ambulatory and able to carry out work of a light or sedentary nature] : Status 1- Restricted in physically strenuous activity but ambulatory and able to carry out work of a light or sedentary nature, e.g., light house work, office work [Thin] : thin [Normal] : affect appropriate [Ulcers] : no ulcers [Mucositis] : no mucositis [de-identified] : Patient has bilateral hearing loss. No mucosal lesions [de-identified] : Brisk bilateral upper extremity reflexes: equal and symmetrical, LE absent bilat

## 2023-08-19 LAB
APPEARANCE UR: CLEAR — SIGNIFICANT CHANGE UP
BILIRUB UR-MCNC: NEGATIVE — SIGNIFICANT CHANGE UP
COLOR SPEC: YELLOW — SIGNIFICANT CHANGE UP
DIFF PNL FLD: NEGATIVE — SIGNIFICANT CHANGE UP
GLUCOSE UR QL: NEGATIVE MG/DL — SIGNIFICANT CHANGE UP
KETONES UR-MCNC: NEGATIVE MG/DL — SIGNIFICANT CHANGE UP
LEUKOCYTE ESTERASE UR-ACNC: NEGATIVE — SIGNIFICANT CHANGE UP
NITRITE UR-MCNC: NEGATIVE — SIGNIFICANT CHANGE UP
PH UR: 5.5 — SIGNIFICANT CHANGE UP (ref 5–8)
PROT UR-MCNC: SIGNIFICANT CHANGE UP MG/DL
SP GR SPEC: 1.02 — SIGNIFICANT CHANGE UP (ref 1–1.03)
UROBILINOGEN FLD QL: 0.2 MG/DL — SIGNIFICANT CHANGE UP (ref 0.2–1)

## 2023-08-21 LAB
ALBUMIN SERPL ELPH-MCNC: 4.2 G/DL
ALP BLD-CCNC: 66 U/L
ALT SERPL-CCNC: 16 U/L
ANION GAP SERPL CALC-SCNC: 13 MMOL/L
APPEARANCE: CLEAR
AST SERPL-CCNC: 19 U/L
BACTERIA: NEGATIVE /HPF
BILIRUB SERPL-MCNC: 0.8 MG/DL
BILIRUBIN URINE: NEGATIVE
BLOOD URINE: NEGATIVE
BUN SERPL-MCNC: 17 MG/DL
CALCIUM SERPL-MCNC: 9.5 MG/DL
CAST: 0 /LPF
CHLORIDE SERPL-SCNC: 103 MMOL/L
CO2 SERPL-SCNC: 26 MMOL/L
COLOR: YELLOW
CREAT SERPL-MCNC: 0.75 MG/DL
EGFR: 87 ML/MIN/1.73M2
EPITHELIAL CELLS: 0 /HPF
GLUCOSE QUALITATIVE U: NEGATIVE MG/DL
GLUCOSE SERPL-MCNC: 88 MG/DL
KETONES URINE: NEGATIVE MG/DL
LEUKOCYTE ESTERASE URINE: ABNORMAL
MAGNESIUM SERPL-MCNC: 1.9 MG/DL
MICROSCOPIC-UA: NORMAL
NITRITE URINE: NEGATIVE
PH URINE: 6.5
POTASSIUM SERPL-SCNC: 4.3 MMOL/L
PROT SERPL-MCNC: 6.5 G/DL
PROTEIN URINE: NEGATIVE MG/DL
RED BLOOD CELLS URINE: 2 /HPF
REVIEW: NORMAL
SODIUM SERPL-SCNC: 143 MMOL/L
SPECIFIC GRAVITY URINE: 1.02
UROBILINOGEN URINE: 0.2 MG/DL
WHITE BLOOD CELLS URINE: 2 /HPF

## 2023-09-04 ENCOUNTER — OUTPATIENT (OUTPATIENT)
Dept: OUTPATIENT SERVICES | Facility: HOSPITAL | Age: 68
LOS: 1 days | Discharge: HOME | End: 2023-09-04

## 2023-09-04 DIAGNOSIS — Z96.21 COCHLEAR IMPLANT STATUS: Chronic | ICD-10-CM

## 2023-09-04 DIAGNOSIS — Z98.890 OTHER SPECIFIED POSTPROCEDURAL STATES: Chronic | ICD-10-CM

## 2023-09-04 DIAGNOSIS — C48.2 MALIGNANT NEOPLASM OF PERITONEUM, UNSPECIFIED: ICD-10-CM

## 2023-09-04 DIAGNOSIS — Z98.51 TUBAL LIGATION STATUS: Chronic | ICD-10-CM

## 2023-09-06 ENCOUNTER — RESULT REVIEW (OUTPATIENT)
Age: 68
End: 2023-09-06

## 2023-09-06 ENCOUNTER — APPOINTMENT (OUTPATIENT)
Dept: HEMATOLOGY ONCOLOGY | Facility: CLINIC | Age: 68
End: 2023-09-06

## 2023-09-06 LAB
BASOPHILS # BLD AUTO: 0 K/UL — SIGNIFICANT CHANGE UP (ref 0–0.2)
BASOPHILS NFR BLD AUTO: 1.2 % — SIGNIFICANT CHANGE UP (ref 0–2)
EOSINOPHIL # BLD AUTO: 0 K/UL — SIGNIFICANT CHANGE UP (ref 0–0.5)
EOSINOPHIL NFR BLD AUTO: 1.1 % — SIGNIFICANT CHANGE UP (ref 0–6)
HCT VFR BLD CALC: 35.3 % — SIGNIFICANT CHANGE UP (ref 34.5–45)
HGB BLD-MCNC: 13 G/DL — SIGNIFICANT CHANGE UP (ref 11.5–15.5)
LYMPHOCYTES # BLD AUTO: 0.9 K/UL — LOW (ref 1–3.3)
LYMPHOCYTES # BLD AUTO: 23.2 % — SIGNIFICANT CHANGE UP (ref 13–44)
MCHC RBC-ENTMCNC: 36.9 G/DL — HIGH (ref 32–36)
MCHC RBC-ENTMCNC: 37.6 PG — HIGH (ref 27–34)
MCV RBC AUTO: 101.9 FL — HIGH (ref 80–100)
MONOCYTES # BLD AUTO: 0.4 K/UL — SIGNIFICANT CHANGE UP (ref 0–0.9)
MONOCYTES NFR BLD AUTO: 10.5 % — SIGNIFICANT CHANGE UP (ref 2–14)
NEUTROPHILS # BLD AUTO: 2.5 K/UL — SIGNIFICANT CHANGE UP (ref 1.8–7.4)
NEUTROPHILS NFR BLD AUTO: 64.1 % — SIGNIFICANT CHANGE UP (ref 43–77)
PLATELET # BLD AUTO: 122 K/UL — LOW (ref 150–400)
RBC # BLD: 3.47 M/UL — LOW (ref 3.8–5.2)
RBC # FLD: 13.1 % — SIGNIFICANT CHANGE UP (ref 10.3–14.5)
WBC # BLD: 3.9 K/UL — SIGNIFICANT CHANGE UP (ref 3.8–10.5)
WBC # FLD AUTO: 3.9 K/UL — SIGNIFICANT CHANGE UP (ref 3.8–10.5)

## 2023-09-08 ENCOUNTER — APPOINTMENT (OUTPATIENT)
Age: 68
End: 2023-09-08

## 2023-09-08 ENCOUNTER — RESULT REVIEW (OUTPATIENT)
Age: 68
End: 2023-09-08

## 2023-09-09 LAB
APPEARANCE UR: CLEAR — SIGNIFICANT CHANGE UP
BILIRUB UR-MCNC: NEGATIVE — SIGNIFICANT CHANGE UP
COLOR SPEC: YELLOW — SIGNIFICANT CHANGE UP
DIFF PNL FLD: NEGATIVE — SIGNIFICANT CHANGE UP
GLUCOSE UR QL: NEGATIVE MG/DL — SIGNIFICANT CHANGE UP
KETONES UR-MCNC: NEGATIVE MG/DL — SIGNIFICANT CHANGE UP
LEUKOCYTE ESTERASE UR-ACNC: NEGATIVE — SIGNIFICANT CHANGE UP
NITRITE UR-MCNC: NEGATIVE — SIGNIFICANT CHANGE UP
PH UR: 6.5 — SIGNIFICANT CHANGE UP (ref 5–8)
PROT UR-MCNC: SIGNIFICANT CHANGE UP MG/DL
SP GR SPEC: 1.02 — SIGNIFICANT CHANGE UP (ref 1–1.03)
UROBILINOGEN FLD QL: 0.2 MG/DL — SIGNIFICANT CHANGE UP (ref 0.2–1)

## 2023-09-11 DIAGNOSIS — Z51.11 ENCOUNTER FOR ANTINEOPLASTIC CHEMOTHERAPY: ICD-10-CM

## 2023-09-11 DIAGNOSIS — R11.2 NAUSEA WITH VOMITING, UNSPECIFIED: ICD-10-CM

## 2023-09-11 LAB
ALBUMIN SERPL ELPH-MCNC: 4.2 G/DL
ALP BLD-CCNC: 63 U/L
ALT SERPL-CCNC: 18 U/L
ANION GAP SERPL CALC-SCNC: 11 MMOL/L
APPEARANCE: CLEAR
AST SERPL-CCNC: 22 U/L
BACTERIA: NEGATIVE /HPF
BILIRUB SERPL-MCNC: 0.7 MG/DL
BILIRUBIN URINE: NEGATIVE
BLOOD URINE: NEGATIVE
BUN SERPL-MCNC: 19 MG/DL
CALCIUM SERPL-MCNC: 9.3 MG/DL
CAST: 0 /LPF
CHLORIDE SERPL-SCNC: 105 MMOL/L
CO2 SERPL-SCNC: 25 MMOL/L
COLOR: NORMAL
CREAT SERPL-MCNC: 0.68 MG/DL
EGFR: 95 ML/MIN/1.73M2
EPITHELIAL CELLS: 1 /HPF
GLUCOSE QUALITATIVE U: NEGATIVE MG/DL
GLUCOSE SERPL-MCNC: 103 MG/DL
KETONES URINE: ABNORMAL MG/DL
LEUKOCYTE ESTERASE URINE: NEGATIVE
MAGNESIUM SERPL-MCNC: 1.7 MG/DL
MICROSCOPIC-UA: NORMAL
NITRITE URINE: NEGATIVE
PH URINE: 6.5
POTASSIUM SERPL-SCNC: 4.5 MMOL/L
PROT SERPL-MCNC: 5.9 G/DL
PROTEIN URINE: 30 MG/DL
RED BLOOD CELLS URINE: 1 /HPF
SODIUM SERPL-SCNC: 141 MMOL/L
SPECIFIC GRAVITY URINE: 1.02
UROBILINOGEN URINE: 1 MG/DL
WHITE BLOOD CELLS URINE: 0 /HPF

## 2023-09-13 ENCOUNTER — RESULT REVIEW (OUTPATIENT)
Age: 68
End: 2023-09-13

## 2023-09-22 ENCOUNTER — OUTPATIENT (OUTPATIENT)
Dept: OUTPATIENT SERVICES | Facility: HOSPITAL | Age: 68
LOS: 1 days | End: 2023-09-22

## 2023-09-22 ENCOUNTER — APPOINTMENT (OUTPATIENT)
Dept: CT IMAGING | Facility: CLINIC | Age: 68
End: 2023-09-22
Payer: MEDICARE

## 2023-09-22 DIAGNOSIS — Z90.722 ACQUIRED ABSENCE OF OVARIES, BILATERAL: Chronic | ICD-10-CM

## 2023-09-22 DIAGNOSIS — Z00.8 ENCOUNTER FOR OTHER GENERAL EXAMINATION: ICD-10-CM

## 2023-09-22 DIAGNOSIS — Z98.890 OTHER SPECIFIED POSTPROCEDURAL STATES: Chronic | ICD-10-CM

## 2023-09-22 PROCEDURE — 71260 CT THORAX DX C+: CPT | Mod: 26

## 2023-09-22 PROCEDURE — 74177 CT ABD & PELVIS W/CONTRAST: CPT | Mod: 26

## 2023-09-26 ENCOUNTER — RESULT REVIEW (OUTPATIENT)
Age: 68
End: 2023-09-26

## 2023-09-26 ENCOUNTER — APPOINTMENT (OUTPATIENT)
Dept: HEMATOLOGY ONCOLOGY | Facility: CLINIC | Age: 68
End: 2023-09-26

## 2023-09-26 LAB
BASOPHILS # BLD AUTO: 0 K/UL — SIGNIFICANT CHANGE UP (ref 0–0.2)
BASOPHILS NFR BLD AUTO: 1.4 % — SIGNIFICANT CHANGE UP (ref 0–2)
EOSINOPHIL # BLD AUTO: 0 K/UL — SIGNIFICANT CHANGE UP (ref 0–0.5)
EOSINOPHIL NFR BLD AUTO: 1.4 % — SIGNIFICANT CHANGE UP (ref 0–6)
HCT VFR BLD CALC: 38.4 % — SIGNIFICANT CHANGE UP (ref 34.5–45)
HGB BLD-MCNC: 13.3 G/DL — SIGNIFICANT CHANGE UP (ref 11.5–15.5)
LYMPHOCYTES # BLD AUTO: 0.8 K/UL — LOW (ref 1–3.3)
LYMPHOCYTES # BLD AUTO: 24.9 % — SIGNIFICANT CHANGE UP (ref 13–44)
MCHC RBC-ENTMCNC: 34.8 G/DL — SIGNIFICANT CHANGE UP (ref 32–36)
MCHC RBC-ENTMCNC: 37.2 PG — HIGH (ref 27–34)
MCV RBC AUTO: 107 FL — HIGH (ref 80–100)
MONOCYTES # BLD AUTO: 0.4 K/UL — SIGNIFICANT CHANGE UP (ref 0–0.9)
MONOCYTES NFR BLD AUTO: 11.9 % — SIGNIFICANT CHANGE UP (ref 2–14)
NEUTROPHILS # BLD AUTO: 2 K/UL — SIGNIFICANT CHANGE UP (ref 1.8–7.4)
NEUTROPHILS NFR BLD AUTO: 60.4 % — SIGNIFICANT CHANGE UP (ref 43–77)
PLATELET # BLD AUTO: 139 K/UL — LOW (ref 150–400)
RBC # BLD: 3.58 M/UL — LOW (ref 3.8–5.2)
RBC # FLD: 13.6 % — SIGNIFICANT CHANGE UP (ref 10.3–14.5)
WBC # BLD: 3.3 K/UL — LOW (ref 3.8–10.5)
WBC # FLD AUTO: 3.3 K/UL — LOW (ref 3.8–10.5)

## 2023-09-29 ENCOUNTER — RESULT REVIEW (OUTPATIENT)
Age: 68
End: 2023-09-29

## 2023-09-29 ENCOUNTER — LABORATORY RESULT (OUTPATIENT)
Age: 68
End: 2023-09-29

## 2023-09-29 ENCOUNTER — APPOINTMENT (OUTPATIENT)
Age: 68
End: 2023-09-29

## 2023-09-29 LAB
BASOPHILS # BLD AUTO: 0 K/UL — SIGNIFICANT CHANGE UP (ref 0–0.2)
BASOPHILS NFR BLD AUTO: 1 % — SIGNIFICANT CHANGE UP (ref 0–2)
BUN SERPL-MCNC: 17 MG/DL — SIGNIFICANT CHANGE UP (ref 7–23)
CA-I BLDA-SCNC: 1.25 MMOL/L — SIGNIFICANT CHANGE UP (ref 1.12–1.3)
CHLORIDE SERPL-SCNC: 100 MMOL/L — SIGNIFICANT CHANGE UP (ref 96–108)
CO2 SERPL-SCNC: 29 MMOL/L — SIGNIFICANT CHANGE UP (ref 22–31)
CREAT SERPL-MCNC: 0.7 MG/DL — SIGNIFICANT CHANGE UP (ref 0.5–1.3)
EOSINOPHIL # BLD AUTO: 0.1 K/UL — SIGNIFICANT CHANGE UP (ref 0–0.5)
EOSINOPHIL NFR BLD AUTO: 1.5 % — SIGNIFICANT CHANGE UP (ref 0–6)
GLUCOSE SERPL-MCNC: 94 MG/DL — SIGNIFICANT CHANGE UP (ref 70–99)
HCT VFR BLD CALC: 39.9 % — SIGNIFICANT CHANGE UP (ref 34.5–45)
HGB BLD-MCNC: 13.8 G/DL — SIGNIFICANT CHANGE UP (ref 11.5–15.5)
LYMPHOCYTES # BLD AUTO: 0.9 K/UL — LOW (ref 1–3.3)
LYMPHOCYTES # BLD AUTO: 26.5 % — SIGNIFICANT CHANGE UP (ref 13–44)
MCHC RBC-ENTMCNC: 34.6 G/DL — SIGNIFICANT CHANGE UP (ref 32–36)
MCHC RBC-ENTMCNC: 36.9 PG — HIGH (ref 27–34)
MCV RBC AUTO: 106.6 FL — HIGH (ref 80–100)
MONOCYTES # BLD AUTO: 0.4 K/UL — SIGNIFICANT CHANGE UP (ref 0–0.9)
MONOCYTES NFR BLD AUTO: 11.6 % — SIGNIFICANT CHANGE UP (ref 2–14)
NEUTROPHILS # BLD AUTO: 2 K/UL — SIGNIFICANT CHANGE UP (ref 1.8–7.4)
NEUTROPHILS NFR BLD AUTO: 59.4 % — SIGNIFICANT CHANGE UP (ref 43–77)
PLATELET # BLD AUTO: 136 K/UL — LOW (ref 150–400)
POTASSIUM SERPL-MCNC: 4.3 MMOL/L — SIGNIFICANT CHANGE UP (ref 3.5–5.3)
POTASSIUM SERPL-SCNC: 4.3 MMOL/L — SIGNIFICANT CHANGE UP (ref 3.5–5.3)
RBC # BLD: 3.74 M/UL — LOW (ref 3.8–5.2)
RBC # FLD: 13.1 % — SIGNIFICANT CHANGE UP (ref 10.3–14.5)
SODIUM SERPL-SCNC: 140 MMOL/L — SIGNIFICANT CHANGE UP (ref 135–145)
WBC # BLD: 3.4 K/UL — LOW (ref 3.8–10.5)
WBC # FLD AUTO: 3.4 K/UL — LOW (ref 3.8–10.5)

## 2023-10-01 LAB
ALBUMIN SERPL ELPH-MCNC: 4.4 G/DL
ALP BLD-CCNC: 64 U/L
ALT SERPL-CCNC: 17 U/L
ANION GAP SERPL CALC-SCNC: 13 MMOL/L
APPEARANCE: CLEAR
AST SERPL-CCNC: 20 U/L
BILIRUB SERPL-MCNC: 0.6 MG/DL
BILIRUBIN URINE: NEGATIVE
BLOOD URINE: NEGATIVE
BUN SERPL-MCNC: 15 MG/DL
CALCIUM SERPL-MCNC: 9.5 MG/DL
CHLORIDE SERPL-SCNC: 104 MMOL/L
CO2 SERPL-SCNC: 26 MMOL/L
COLOR: NORMAL
CREAT SERPL-MCNC: 0.71 MG/DL
EGFR: 93 ML/MIN/1.73M2
GLUCOSE QUALITATIVE U: NEGATIVE MG/DL
GLUCOSE SERPL-MCNC: 95 MG/DL
KETONES URINE: NEGATIVE MG/DL
LEUKOCYTE ESTERASE URINE: NEGATIVE
MAGNESIUM SERPL-MCNC: 1.8 MG/DL
NITRITE URINE: NEGATIVE
PH URINE: 7.5
POTASSIUM SERPL-SCNC: 4.7 MMOL/L
PROT SERPL-MCNC: 6.5 G/DL
PROTEIN URINE: 30 MG/DL
SODIUM SERPL-SCNC: 144 MMOL/L
SPECIFIC GRAVITY URINE: 1.02
UROBILINOGEN URINE: 1 MG/DL

## 2023-10-13 ENCOUNTER — RESULT REVIEW (OUTPATIENT)
Age: 68
End: 2023-10-13

## 2023-10-13 ENCOUNTER — APPOINTMENT (OUTPATIENT)
Dept: HEMATOLOGY ONCOLOGY | Facility: CLINIC | Age: 68
End: 2023-10-13
Payer: MEDICARE

## 2023-10-13 VITALS
HEIGHT: 62 IN | HEART RATE: 58 BPM | SYSTOLIC BLOOD PRESSURE: 182 MMHG | WEIGHT: 139 LBS | TEMPERATURE: 98.2 F | BODY MASS INDEX: 25.58 KG/M2 | DIASTOLIC BLOOD PRESSURE: 72 MMHG | OXYGEN SATURATION: 97 %

## 2023-10-13 DIAGNOSIS — M25.561 PAIN IN RIGHT KNEE: ICD-10-CM

## 2023-10-13 DIAGNOSIS — M25.562 PAIN IN RIGHT KNEE: ICD-10-CM

## 2023-10-13 LAB
BASOPHILS # BLD AUTO: 0 K/UL — SIGNIFICANT CHANGE UP (ref 0–0.2)
BASOPHILS NFR BLD AUTO: 1.5 % — SIGNIFICANT CHANGE UP (ref 0–2)
EOSINOPHIL # BLD AUTO: 0.1 K/UL — SIGNIFICANT CHANGE UP (ref 0–0.5)
EOSINOPHIL NFR BLD AUTO: 1.6 % — SIGNIFICANT CHANGE UP (ref 0–6)
HCT VFR BLD CALC: 35.6 % — SIGNIFICANT CHANGE UP (ref 34.5–45)
HGB BLD-MCNC: 12.8 G/DL — SIGNIFICANT CHANGE UP (ref 11.5–15.5)
LYMPHOCYTES # BLD AUTO: 0.8 K/UL — LOW (ref 1–3.3)
LYMPHOCYTES # BLD AUTO: 24.2 % — SIGNIFICANT CHANGE UP (ref 13–44)
MCHC RBC-ENTMCNC: 36 G/DL — SIGNIFICANT CHANGE UP (ref 32–36)
MCHC RBC-ENTMCNC: 37.7 PG — HIGH (ref 27–34)
MCV RBC AUTO: 104.7 FL — HIGH (ref 80–100)
MONOCYTES # BLD AUTO: 0.4 K/UL — SIGNIFICANT CHANGE UP (ref 0–0.9)
MONOCYTES NFR BLD AUTO: 11.2 % — SIGNIFICANT CHANGE UP (ref 2–14)
NEUTROPHILS # BLD AUTO: 2.1 K/UL — SIGNIFICANT CHANGE UP (ref 1.8–7.4)
NEUTROPHILS NFR BLD AUTO: 61.6 % — SIGNIFICANT CHANGE UP (ref 43–77)
PLATELET # BLD AUTO: 134 K/UL — LOW (ref 150–400)
RBC # BLD: 3.4 M/UL — LOW (ref 3.8–5.2)
RBC # FLD: 12.5 % — SIGNIFICANT CHANGE UP (ref 10.3–14.5)
WBC # BLD: 3.4 K/UL — LOW (ref 3.8–10.5)
WBC # FLD AUTO: 3.4 K/UL — LOW (ref 3.8–10.5)

## 2023-10-13 PROCEDURE — 99214 OFFICE O/P EST MOD 30 MIN: CPT

## 2023-10-20 ENCOUNTER — APPOINTMENT (OUTPATIENT)
Age: 68
End: 2023-10-20

## 2023-10-20 ENCOUNTER — RESULT REVIEW (OUTPATIENT)
Age: 68
End: 2023-10-20

## 2023-10-20 LAB
BASOPHILS # BLD AUTO: 0.1 K/UL — SIGNIFICANT CHANGE UP (ref 0–0.2)
BASOPHILS # BLD AUTO: 0.1 K/UL — SIGNIFICANT CHANGE UP (ref 0–0.2)
BASOPHILS NFR BLD AUTO: 1.4 % — SIGNIFICANT CHANGE UP (ref 0–2)
BASOPHILS NFR BLD AUTO: 1.4 % — SIGNIFICANT CHANGE UP (ref 0–2)
EOSINOPHIL # BLD AUTO: 0 K/UL — SIGNIFICANT CHANGE UP (ref 0–0.5)
EOSINOPHIL # BLD AUTO: 0 K/UL — SIGNIFICANT CHANGE UP (ref 0–0.5)
EOSINOPHIL NFR BLD AUTO: 0.9 % — SIGNIFICANT CHANGE UP (ref 0–6)
EOSINOPHIL NFR BLD AUTO: 0.9 % — SIGNIFICANT CHANGE UP (ref 0–6)
HCT VFR BLD CALC: 36.7 % — SIGNIFICANT CHANGE UP (ref 34.5–45)
HCT VFR BLD CALC: 36.7 % — SIGNIFICANT CHANGE UP (ref 34.5–45)
HGB BLD-MCNC: 13.2 G/DL — SIGNIFICANT CHANGE UP (ref 11.5–15.5)
HGB BLD-MCNC: 13.2 G/DL — SIGNIFICANT CHANGE UP (ref 11.5–15.5)
LYMPHOCYTES # BLD AUTO: 1 K/UL — SIGNIFICANT CHANGE UP (ref 1–3.3)
LYMPHOCYTES # BLD AUTO: 1 K/UL — SIGNIFICANT CHANGE UP (ref 1–3.3)
LYMPHOCYTES # BLD AUTO: 24.3 % — SIGNIFICANT CHANGE UP (ref 13–44)
LYMPHOCYTES # BLD AUTO: 24.3 % — SIGNIFICANT CHANGE UP (ref 13–44)
MCHC RBC-ENTMCNC: 36 G/DL — SIGNIFICANT CHANGE UP (ref 32–36)
MCHC RBC-ENTMCNC: 36 G/DL — SIGNIFICANT CHANGE UP (ref 32–36)
MCHC RBC-ENTMCNC: 38.2 PG — HIGH (ref 27–34)
MCHC RBC-ENTMCNC: 38.2 PG — HIGH (ref 27–34)
MCV RBC AUTO: 106.2 FL — HIGH (ref 80–100)
MCV RBC AUTO: 106.2 FL — HIGH (ref 80–100)
MONOCYTES # BLD AUTO: 0.5 K/UL — SIGNIFICANT CHANGE UP (ref 0–0.9)
MONOCYTES # BLD AUTO: 0.5 K/UL — SIGNIFICANT CHANGE UP (ref 0–0.9)
MONOCYTES NFR BLD AUTO: 12.4 % — SIGNIFICANT CHANGE UP (ref 2–14)
MONOCYTES NFR BLD AUTO: 12.4 % — SIGNIFICANT CHANGE UP (ref 2–14)
NEUTROPHILS # BLD AUTO: 2.4 K/UL — SIGNIFICANT CHANGE UP (ref 1.8–7.4)
NEUTROPHILS # BLD AUTO: 2.4 K/UL — SIGNIFICANT CHANGE UP (ref 1.8–7.4)
NEUTROPHILS NFR BLD AUTO: 60.8 % — SIGNIFICANT CHANGE UP (ref 43–77)
NEUTROPHILS NFR BLD AUTO: 60.8 % — SIGNIFICANT CHANGE UP (ref 43–77)
PLATELET # BLD AUTO: 152 K/UL — SIGNIFICANT CHANGE UP (ref 150–400)
PLATELET # BLD AUTO: 152 K/UL — SIGNIFICANT CHANGE UP (ref 150–400)
RBC # BLD: 3.45 M/UL — LOW (ref 3.8–5.2)
RBC # BLD: 3.45 M/UL — LOW (ref 3.8–5.2)
RBC # FLD: 13.1 % — SIGNIFICANT CHANGE UP (ref 10.3–14.5)
RBC # FLD: 13.1 % — SIGNIFICANT CHANGE UP (ref 10.3–14.5)
WBC # BLD: 4 K/UL — SIGNIFICANT CHANGE UP (ref 3.8–10.5)
WBC # BLD: 4 K/UL — SIGNIFICANT CHANGE UP (ref 3.8–10.5)
WBC # FLD AUTO: 4 K/UL — SIGNIFICANT CHANGE UP (ref 3.8–10.5)
WBC # FLD AUTO: 4 K/UL — SIGNIFICANT CHANGE UP (ref 3.8–10.5)

## 2023-10-21 LAB
APPEARANCE UR: CLEAR — SIGNIFICANT CHANGE UP
APPEARANCE UR: CLEAR — SIGNIFICANT CHANGE UP
BILIRUB UR-MCNC: NEGATIVE — SIGNIFICANT CHANGE UP
BILIRUB UR-MCNC: NEGATIVE — SIGNIFICANT CHANGE UP
COLOR SPEC: SIGNIFICANT CHANGE UP
COLOR SPEC: SIGNIFICANT CHANGE UP
DIFF PNL FLD: NEGATIVE — SIGNIFICANT CHANGE UP
DIFF PNL FLD: NEGATIVE — SIGNIFICANT CHANGE UP
GLUCOSE UR QL: NEGATIVE MG/DL — SIGNIFICANT CHANGE UP
GLUCOSE UR QL: NEGATIVE MG/DL — SIGNIFICANT CHANGE UP
KETONES UR-MCNC: NEGATIVE MG/DL — SIGNIFICANT CHANGE UP
KETONES UR-MCNC: NEGATIVE MG/DL — SIGNIFICANT CHANGE UP
LEUKOCYTE ESTERASE UR-ACNC: NEGATIVE — SIGNIFICANT CHANGE UP
LEUKOCYTE ESTERASE UR-ACNC: NEGATIVE — SIGNIFICANT CHANGE UP
NITRITE UR-MCNC: NEGATIVE — SIGNIFICANT CHANGE UP
NITRITE UR-MCNC: NEGATIVE — SIGNIFICANT CHANGE UP
PH UR: 6.5 — SIGNIFICANT CHANGE UP (ref 5–8)
PH UR: 6.5 — SIGNIFICANT CHANGE UP (ref 5–8)
PROT UR-MCNC: 30 MG/DL
PROT UR-MCNC: 30 MG/DL
SP GR SPEC: 1.03 — SIGNIFICANT CHANGE UP (ref 1–1.03)
SP GR SPEC: 1.03 — SIGNIFICANT CHANGE UP (ref 1–1.03)
UROBILINOGEN FLD QL: 0.2 MG/DL — SIGNIFICANT CHANGE UP (ref 0.2–1)
UROBILINOGEN FLD QL: 0.2 MG/DL — SIGNIFICANT CHANGE UP (ref 0.2–1)

## 2023-10-30 LAB
ALBUMIN SERPL ELPH-MCNC: 4.1 G/DL
ALP BLD-CCNC: 60 U/L
ALT SERPL-CCNC: 16 U/L
ANION GAP SERPL CALC-SCNC: 10 MMOL/L
APPEARANCE: CLEAR
AST SERPL-CCNC: 20 U/L
BACTERIA: NEGATIVE /HPF
BILIRUB SERPL-MCNC: 0.6 MG/DL
BILIRUBIN URINE: NEGATIVE
BLOOD URINE: NEGATIVE
BUN SERPL-MCNC: 25 MG/DL
CALCIUM SERPL-MCNC: 9.2 MG/DL
CAST: 1 /LPF
CHLORIDE SERPL-SCNC: 104 MMOL/L
CO2 SERPL-SCNC: 26 MMOL/L
COLOR: YELLOW
CREAT SERPL-MCNC: 0.78 MG/DL
EGFR: 83 ML/MIN/1.73M2
EPITHELIAL CELLS: 1 /HPF
GLUCOSE QUALITATIVE U: NEGATIVE MG/DL
GLUCOSE SERPL-MCNC: 107 MG/DL
KETONES URINE: NEGATIVE MG/DL
LEUKOCYTE ESTERASE URINE: NEGATIVE
MAGNESIUM SERPL-MCNC: 1.7 MG/DL
MICROSCOPIC-UA: NORMAL
MUCUS: PRESENT
NITRITE URINE: NEGATIVE
PH URINE: 5.5
POTASSIUM SERPL-SCNC: 4.6 MMOL/L
PROT SERPL-MCNC: 6 G/DL
PROTEIN URINE: 30 MG/DL
RED BLOOD CELLS URINE: 1 /HPF
REVIEW: NORMAL
SODIUM SERPL-SCNC: 140 MMOL/L
SPECIFIC GRAVITY URINE: 1.02
UROBILINOGEN URINE: 0.2 MG/DL
WHITE BLOOD CELLS URINE: 2 /HPF

## 2023-11-02 ENCOUNTER — APPOINTMENT (OUTPATIENT)
Dept: GYNECOLOGIC ONCOLOGY | Facility: CLINIC | Age: 68
End: 2023-11-02
Payer: MEDICARE

## 2023-11-02 VITALS
DIASTOLIC BLOOD PRESSURE: 68 MMHG | BODY MASS INDEX: 24.48 KG/M2 | HEIGHT: 62 IN | WEIGHT: 133 LBS | OXYGEN SATURATION: 98 % | SYSTOLIC BLOOD PRESSURE: 124 MMHG | HEART RATE: 81 BPM

## 2023-11-02 PROCEDURE — 99214 OFFICE O/P EST MOD 30 MIN: CPT

## 2023-11-03 ENCOUNTER — RESULT REVIEW (OUTPATIENT)
Age: 68
End: 2023-11-03

## 2023-11-03 ENCOUNTER — APPOINTMENT (OUTPATIENT)
Dept: HEMATOLOGY ONCOLOGY | Facility: CLINIC | Age: 68
End: 2023-11-03

## 2023-11-03 LAB
BASOPHILS # BLD AUTO: 0 K/UL — SIGNIFICANT CHANGE UP (ref 0–0.2)
BASOPHILS # BLD AUTO: 0 K/UL — SIGNIFICANT CHANGE UP (ref 0–0.2)
BASOPHILS NFR BLD AUTO: 1 % — SIGNIFICANT CHANGE UP (ref 0–2)
BASOPHILS NFR BLD AUTO: 1 % — SIGNIFICANT CHANGE UP (ref 0–2)
EOSINOPHIL # BLD AUTO: 0.1 K/UL — SIGNIFICANT CHANGE UP (ref 0–0.5)
EOSINOPHIL # BLD AUTO: 0.1 K/UL — SIGNIFICANT CHANGE UP (ref 0–0.5)
EOSINOPHIL NFR BLD AUTO: 1.3 % — SIGNIFICANT CHANGE UP (ref 0–6)
EOSINOPHIL NFR BLD AUTO: 1.3 % — SIGNIFICANT CHANGE UP (ref 0–6)
HCT VFR BLD CALC: 37.9 % — SIGNIFICANT CHANGE UP (ref 34.5–45)
HCT VFR BLD CALC: 37.9 % — SIGNIFICANT CHANGE UP (ref 34.5–45)
HGB BLD-MCNC: 13.8 G/DL — SIGNIFICANT CHANGE UP (ref 11.5–15.5)
HGB BLD-MCNC: 13.8 G/DL — SIGNIFICANT CHANGE UP (ref 11.5–15.5)
LYMPHOCYTES # BLD AUTO: 0.8 K/UL — LOW (ref 1–3.3)
LYMPHOCYTES # BLD AUTO: 0.8 K/UL — LOW (ref 1–3.3)
LYMPHOCYTES # BLD AUTO: 18.8 % — SIGNIFICANT CHANGE UP (ref 13–44)
LYMPHOCYTES # BLD AUTO: 18.8 % — SIGNIFICANT CHANGE UP (ref 13–44)
MCHC RBC-ENTMCNC: 36.4 G/DL — HIGH (ref 32–36)
MCHC RBC-ENTMCNC: 36.4 G/DL — HIGH (ref 32–36)
MCHC RBC-ENTMCNC: 38.5 PG — HIGH (ref 27–34)
MCHC RBC-ENTMCNC: 38.5 PG — HIGH (ref 27–34)
MCV RBC AUTO: 106 FL — HIGH (ref 80–100)
MCV RBC AUTO: 106 FL — HIGH (ref 80–100)
MONOCYTES # BLD AUTO: 0.5 K/UL — SIGNIFICANT CHANGE UP (ref 0–0.9)
MONOCYTES # BLD AUTO: 0.5 K/UL — SIGNIFICANT CHANGE UP (ref 0–0.9)
MONOCYTES NFR BLD AUTO: 10.7 % — SIGNIFICANT CHANGE UP (ref 2–14)
MONOCYTES NFR BLD AUTO: 10.7 % — SIGNIFICANT CHANGE UP (ref 2–14)
NEUTROPHILS # BLD AUTO: 2.9 K/UL — SIGNIFICANT CHANGE UP (ref 1.8–7.4)
NEUTROPHILS # BLD AUTO: 2.9 K/UL — SIGNIFICANT CHANGE UP (ref 1.8–7.4)
NEUTROPHILS NFR BLD AUTO: 68.2 % — SIGNIFICANT CHANGE UP (ref 43–77)
NEUTROPHILS NFR BLD AUTO: 68.2 % — SIGNIFICANT CHANGE UP (ref 43–77)
PLATELET # BLD AUTO: 159 K/UL — SIGNIFICANT CHANGE UP (ref 150–400)
PLATELET # BLD AUTO: 159 K/UL — SIGNIFICANT CHANGE UP (ref 150–400)
RBC # BLD: 3.58 M/UL — LOW (ref 3.8–5.2)
RBC # BLD: 3.58 M/UL — LOW (ref 3.8–5.2)
RBC # FLD: 13.7 % — SIGNIFICANT CHANGE UP (ref 10.3–14.5)
RBC # FLD: 13.7 % — SIGNIFICANT CHANGE UP (ref 10.3–14.5)
WBC # BLD: 4.3 K/UL — SIGNIFICANT CHANGE UP (ref 3.8–10.5)
WBC # BLD: 4.3 K/UL — SIGNIFICANT CHANGE UP (ref 3.8–10.5)
WBC # FLD AUTO: 4.3 K/UL — SIGNIFICANT CHANGE UP (ref 3.8–10.5)
WBC # FLD AUTO: 4.3 K/UL — SIGNIFICANT CHANGE UP (ref 3.8–10.5)

## 2023-11-06 ENCOUNTER — OUTPATIENT (OUTPATIENT)
Dept: OUTPATIENT SERVICES | Facility: HOSPITAL | Age: 68
LOS: 1 days | Discharge: HOME | End: 2023-11-06

## 2023-11-06 DIAGNOSIS — Z98.890 OTHER SPECIFIED POSTPROCEDURAL STATES: Chronic | ICD-10-CM

## 2023-11-06 DIAGNOSIS — C48.2 MALIGNANT NEOPLASM OF PERITONEUM, UNSPECIFIED: ICD-10-CM

## 2023-11-06 DIAGNOSIS — Z96.21 COCHLEAR IMPLANT STATUS: Chronic | ICD-10-CM

## 2023-11-06 DIAGNOSIS — Z98.51 TUBAL LIGATION STATUS: Chronic | ICD-10-CM

## 2023-11-06 DIAGNOSIS — Z90.722 ACQUIRED ABSENCE OF OVARIES, BILATERAL: Chronic | ICD-10-CM

## 2023-11-08 LAB
ALBUMIN SERPL ELPH-MCNC: 4.4 G/DL
ALP BLD-CCNC: 70 U/L
ALT SERPL-CCNC: 25 U/L
ANION GAP SERPL CALC-SCNC: 12 MMOL/L
AST SERPL-CCNC: 25 U/L
BILIRUB SERPL-MCNC: 0.7 MG/DL
BUN SERPL-MCNC: 20 MG/DL
CALCIUM SERPL-MCNC: 9.6 MG/DL
CANCER AG125 SERPL-ACNC: 8 U/ML
CHLORIDE SERPL-SCNC: 102 MMOL/L
CO2 SERPL-SCNC: 28 MMOL/L
CREAT SERPL-MCNC: 0.8 MG/DL
EGFR: 81 ML/MIN/1.73M2
GLUCOSE SERPL-MCNC: 107 MG/DL
MAGNESIUM SERPL-MCNC: 1.7 MG/DL
POTASSIUM SERPL-SCNC: 4.4 MMOL/L
PROT SERPL-MCNC: 6.5 G/DL
SODIUM SERPL-SCNC: 142 MMOL/L

## 2023-11-10 ENCOUNTER — APPOINTMENT (OUTPATIENT)
Age: 68
End: 2023-11-10

## 2023-11-10 ENCOUNTER — RESULT REVIEW (OUTPATIENT)
Age: 68
End: 2023-11-10

## 2023-11-10 LAB
APPEARANCE UR: CLEAR — SIGNIFICANT CHANGE UP
APPEARANCE UR: CLEAR — SIGNIFICANT CHANGE UP
BASOPHILS # BLD AUTO: 0.1 K/UL — SIGNIFICANT CHANGE UP (ref 0–0.2)
BASOPHILS # BLD AUTO: 0.1 K/UL — SIGNIFICANT CHANGE UP (ref 0–0.2)
BASOPHILS NFR BLD AUTO: 1.1 % — SIGNIFICANT CHANGE UP (ref 0–2)
BASOPHILS NFR BLD AUTO: 1.1 % — SIGNIFICANT CHANGE UP (ref 0–2)
BILIRUB UR-MCNC: NEGATIVE — SIGNIFICANT CHANGE UP
BILIRUB UR-MCNC: NEGATIVE — SIGNIFICANT CHANGE UP
COLOR SPEC: SIGNIFICANT CHANGE UP
COLOR SPEC: SIGNIFICANT CHANGE UP
DIFF PNL FLD: NEGATIVE — SIGNIFICANT CHANGE UP
DIFF PNL FLD: NEGATIVE — SIGNIFICANT CHANGE UP
EOSINOPHIL # BLD AUTO: 0.1 K/UL — SIGNIFICANT CHANGE UP (ref 0–0.5)
EOSINOPHIL # BLD AUTO: 0.1 K/UL — SIGNIFICANT CHANGE UP (ref 0–0.5)
EOSINOPHIL NFR BLD AUTO: 1.2 % — SIGNIFICANT CHANGE UP (ref 0–6)
EOSINOPHIL NFR BLD AUTO: 1.2 % — SIGNIFICANT CHANGE UP (ref 0–6)
GLUCOSE UR QL: NEGATIVE MG/DL — SIGNIFICANT CHANGE UP
GLUCOSE UR QL: NEGATIVE MG/DL — SIGNIFICANT CHANGE UP
HCT VFR BLD CALC: 40.2 % — SIGNIFICANT CHANGE UP (ref 34.5–45)
HCT VFR BLD CALC: 40.2 % — SIGNIFICANT CHANGE UP (ref 34.5–45)
HGB BLD-MCNC: 13.8 G/DL — SIGNIFICANT CHANGE UP (ref 11.5–15.5)
HGB BLD-MCNC: 13.8 G/DL — SIGNIFICANT CHANGE UP (ref 11.5–15.5)
KETONES UR-MCNC: NEGATIVE MG/DL — SIGNIFICANT CHANGE UP
KETONES UR-MCNC: NEGATIVE MG/DL — SIGNIFICANT CHANGE UP
LEUKOCYTE ESTERASE UR-ACNC: NEGATIVE — SIGNIFICANT CHANGE UP
LEUKOCYTE ESTERASE UR-ACNC: NEGATIVE — SIGNIFICANT CHANGE UP
LYMPHOCYTES # BLD AUTO: 1.1 K/UL — SIGNIFICANT CHANGE UP (ref 1–3.3)
LYMPHOCYTES # BLD AUTO: 1.1 K/UL — SIGNIFICANT CHANGE UP (ref 1–3.3)
LYMPHOCYTES # BLD AUTO: 22 % — SIGNIFICANT CHANGE UP (ref 13–44)
LYMPHOCYTES # BLD AUTO: 22 % — SIGNIFICANT CHANGE UP (ref 13–44)
MCHC RBC-ENTMCNC: 34.2 G/DL — SIGNIFICANT CHANGE UP (ref 32–36)
MCHC RBC-ENTMCNC: 34.2 G/DL — SIGNIFICANT CHANGE UP (ref 32–36)
MCHC RBC-ENTMCNC: 37.4 PG — HIGH (ref 27–34)
MCHC RBC-ENTMCNC: 37.4 PG — HIGH (ref 27–34)
MCV RBC AUTO: 109.4 FL — HIGH (ref 80–100)
MCV RBC AUTO: 109.4 FL — HIGH (ref 80–100)
MONOCYTES # BLD AUTO: 0.5 K/UL — SIGNIFICANT CHANGE UP (ref 0–0.9)
MONOCYTES # BLD AUTO: 0.5 K/UL — SIGNIFICANT CHANGE UP (ref 0–0.9)
MONOCYTES NFR BLD AUTO: 10.1 % — SIGNIFICANT CHANGE UP (ref 2–14)
MONOCYTES NFR BLD AUTO: 10.1 % — SIGNIFICANT CHANGE UP (ref 2–14)
NEUTROPHILS # BLD AUTO: 3.3 K/UL — SIGNIFICANT CHANGE UP (ref 1.8–7.4)
NEUTROPHILS # BLD AUTO: 3.3 K/UL — SIGNIFICANT CHANGE UP (ref 1.8–7.4)
NEUTROPHILS NFR BLD AUTO: 65.5 % — SIGNIFICANT CHANGE UP (ref 43–77)
NEUTROPHILS NFR BLD AUTO: 65.5 % — SIGNIFICANT CHANGE UP (ref 43–77)
NITRITE UR-MCNC: NEGATIVE — SIGNIFICANT CHANGE UP
NITRITE UR-MCNC: NEGATIVE — SIGNIFICANT CHANGE UP
PH UR: 5.5 — SIGNIFICANT CHANGE UP (ref 5–8)
PH UR: 5.5 — SIGNIFICANT CHANGE UP (ref 5–8)
PLATELET # BLD AUTO: 160 K/UL — SIGNIFICANT CHANGE UP (ref 150–400)
PLATELET # BLD AUTO: 160 K/UL — SIGNIFICANT CHANGE UP (ref 150–400)
PROT UR-MCNC: 30 MG/DL
PROT UR-MCNC: 30 MG/DL
RBC # BLD: 3.68 M/UL — LOW (ref 3.8–5.2)
RBC # BLD: 3.68 M/UL — LOW (ref 3.8–5.2)
RBC # FLD: 13.7 % — SIGNIFICANT CHANGE UP (ref 10.3–14.5)
RBC # FLD: 13.7 % — SIGNIFICANT CHANGE UP (ref 10.3–14.5)
SP GR SPEC: 1.02 — SIGNIFICANT CHANGE UP (ref 1–1.03)
SP GR SPEC: 1.02 — SIGNIFICANT CHANGE UP (ref 1–1.03)
UROBILINOGEN FLD QL: 0.2 MG/DL — SIGNIFICANT CHANGE UP (ref 0.2–1)
UROBILINOGEN FLD QL: 0.2 MG/DL — SIGNIFICANT CHANGE UP (ref 0.2–1)
WBC # BLD: 5 K/UL — SIGNIFICANT CHANGE UP (ref 3.8–10.5)
WBC # BLD: 5 K/UL — SIGNIFICANT CHANGE UP (ref 3.8–10.5)
WBC # FLD AUTO: 5 K/UL — SIGNIFICANT CHANGE UP (ref 3.8–10.5)
WBC # FLD AUTO: 5 K/UL — SIGNIFICANT CHANGE UP (ref 3.8–10.5)

## 2023-11-11 LAB
BACTERIA # UR AUTO: NEGATIVE /HPF — SIGNIFICANT CHANGE UP
BACTERIA # UR AUTO: NEGATIVE /HPF — SIGNIFICANT CHANGE UP
CAST: 2 /LPF — SIGNIFICANT CHANGE UP (ref 0–4)
CAST: 2 /LPF — SIGNIFICANT CHANGE UP (ref 0–4)
RBC CASTS # UR COMP ASSIST: 1 /HPF — SIGNIFICANT CHANGE UP (ref 0–4)
RBC CASTS # UR COMP ASSIST: 1 /HPF — SIGNIFICANT CHANGE UP (ref 0–4)
SQUAMOUS # UR AUTO: 0 /HPF — SIGNIFICANT CHANGE UP (ref 0–5)
SQUAMOUS # UR AUTO: 0 /HPF — SIGNIFICANT CHANGE UP (ref 0–5)
WBC UR QL: 1 /HPF — SIGNIFICANT CHANGE UP (ref 0–5)
WBC UR QL: 1 /HPF — SIGNIFICANT CHANGE UP (ref 0–5)

## 2023-11-13 DIAGNOSIS — Z51.11 ENCOUNTER FOR ANTINEOPLASTIC CHEMOTHERAPY: ICD-10-CM

## 2023-11-13 DIAGNOSIS — R11.2 NAUSEA WITH VOMITING, UNSPECIFIED: ICD-10-CM

## 2023-11-24 ENCOUNTER — LABORATORY RESULT (OUTPATIENT)
Age: 68
End: 2023-11-24

## 2023-11-24 ENCOUNTER — RESULT REVIEW (OUTPATIENT)
Age: 68
End: 2023-11-24

## 2023-11-24 ENCOUNTER — APPOINTMENT (OUTPATIENT)
Dept: HEMATOLOGY ONCOLOGY | Facility: CLINIC | Age: 68
End: 2023-11-24

## 2023-11-24 LAB
BASOPHILS # BLD AUTO: 0 K/UL — SIGNIFICANT CHANGE UP (ref 0–0.2)
BASOPHILS # BLD AUTO: 0 K/UL — SIGNIFICANT CHANGE UP (ref 0–0.2)
BASOPHILS NFR BLD AUTO: 0.8 % — SIGNIFICANT CHANGE UP (ref 0–2)
BASOPHILS NFR BLD AUTO: 0.8 % — SIGNIFICANT CHANGE UP (ref 0–2)
EOSINOPHIL # BLD AUTO: 0 K/UL — SIGNIFICANT CHANGE UP (ref 0–0.5)
EOSINOPHIL # BLD AUTO: 0 K/UL — SIGNIFICANT CHANGE UP (ref 0–0.5)
EOSINOPHIL NFR BLD AUTO: 0.7 % — SIGNIFICANT CHANGE UP (ref 0–6)
EOSINOPHIL NFR BLD AUTO: 0.7 % — SIGNIFICANT CHANGE UP (ref 0–6)
HCT VFR BLD CALC: 36.2 % — SIGNIFICANT CHANGE UP (ref 34.5–45)
HCT VFR BLD CALC: 36.2 % — SIGNIFICANT CHANGE UP (ref 34.5–45)
HGB BLD-MCNC: 12.5 G/DL — SIGNIFICANT CHANGE UP (ref 11.5–15.5)
HGB BLD-MCNC: 12.5 G/DL — SIGNIFICANT CHANGE UP (ref 11.5–15.5)
LYMPHOCYTES # BLD AUTO: 1 K/UL — SIGNIFICANT CHANGE UP (ref 1–3.3)
LYMPHOCYTES # BLD AUTO: 1 K/UL — SIGNIFICANT CHANGE UP (ref 1–3.3)
LYMPHOCYTES # BLD AUTO: 17.9 % — SIGNIFICANT CHANGE UP (ref 13–44)
LYMPHOCYTES # BLD AUTO: 17.9 % — SIGNIFICANT CHANGE UP (ref 13–44)
MCHC RBC-ENTMCNC: 34.6 G/DL — SIGNIFICANT CHANGE UP (ref 32–36)
MCHC RBC-ENTMCNC: 34.6 G/DL — SIGNIFICANT CHANGE UP (ref 32–36)
MCHC RBC-ENTMCNC: 38.3 PG — HIGH (ref 27–34)
MCHC RBC-ENTMCNC: 38.3 PG — HIGH (ref 27–34)
MCV RBC AUTO: 110.6 FL — HIGH (ref 80–100)
MCV RBC AUTO: 110.6 FL — HIGH (ref 80–100)
MONOCYTES # BLD AUTO: 0.5 K/UL — SIGNIFICANT CHANGE UP (ref 0–0.9)
MONOCYTES # BLD AUTO: 0.5 K/UL — SIGNIFICANT CHANGE UP (ref 0–0.9)
MONOCYTES NFR BLD AUTO: 8.4 % — SIGNIFICANT CHANGE UP (ref 2–14)
MONOCYTES NFR BLD AUTO: 8.4 % — SIGNIFICANT CHANGE UP (ref 2–14)
NEUTROPHILS # BLD AUTO: 4 K/UL — SIGNIFICANT CHANGE UP (ref 1.8–7.4)
NEUTROPHILS # BLD AUTO: 4 K/UL — SIGNIFICANT CHANGE UP (ref 1.8–7.4)
NEUTROPHILS NFR BLD AUTO: 72.3 % — SIGNIFICANT CHANGE UP (ref 43–77)
NEUTROPHILS NFR BLD AUTO: 72.3 % — SIGNIFICANT CHANGE UP (ref 43–77)
PLATELET # BLD AUTO: 140 K/UL — LOW (ref 150–400)
PLATELET # BLD AUTO: 140 K/UL — LOW (ref 150–400)
RBC # BLD: 3.28 M/UL — LOW (ref 3.8–5.2)
RBC # BLD: 3.28 M/UL — LOW (ref 3.8–5.2)
RBC # FLD: 12.9 % — SIGNIFICANT CHANGE UP (ref 10.3–14.5)
RBC # FLD: 12.9 % — SIGNIFICANT CHANGE UP (ref 10.3–14.5)
WBC # BLD: 5.5 K/UL — SIGNIFICANT CHANGE UP (ref 3.8–10.5)
WBC # BLD: 5.5 K/UL — SIGNIFICANT CHANGE UP (ref 3.8–10.5)
WBC # FLD AUTO: 5.5 K/UL — SIGNIFICANT CHANGE UP (ref 3.8–10.5)
WBC # FLD AUTO: 5.5 K/UL — SIGNIFICANT CHANGE UP (ref 3.8–10.5)

## 2023-11-27 LAB
ALBUMIN SERPL ELPH-MCNC: 4 G/DL
ALP BLD-CCNC: 62 U/L
ALT SERPL-CCNC: 21 U/L
ANION GAP SERPL CALC-SCNC: 9 MMOL/L
APPEARANCE: CLEAR
AST SERPL-CCNC: 23 U/L
BILIRUB SERPL-MCNC: 0.4 MG/DL
BILIRUBIN URINE: NEGATIVE
BLOOD URINE: NEGATIVE
BUN SERPL-MCNC: 19 MG/DL
CALCIUM SERPL-MCNC: 9 MG/DL
CANCER AG125 SERPL-ACNC: 7 U/ML
CHLORIDE SERPL-SCNC: 104 MMOL/L
CO2 SERPL-SCNC: 27 MMOL/L
COLOR: NORMAL
CREAT SERPL-MCNC: 0.72 MG/DL
EGFR: 92 ML/MIN/1.73M2
GLUCOSE QUALITATIVE U: NEGATIVE MG/DL
GLUCOSE SERPL-MCNC: 126 MG/DL
KETONES URINE: NEGATIVE MG/DL
LEUKOCYTE ESTERASE URINE: NEGATIVE
MAGNESIUM SERPL-MCNC: 1.7 MG/DL
NITRITE URINE: NEGATIVE
PH URINE: 5.5
POTASSIUM SERPL-SCNC: 4.4 MMOL/L
PROT SERPL-MCNC: 5.9 G/DL
PROTEIN URINE: 100 MG/DL
SODIUM SERPL-SCNC: 140 MMOL/L
SPECIFIC GRAVITY URINE: 1.03
UROBILINOGEN URINE: 0.2 MG/DL

## 2023-11-28 ENCOUNTER — APPOINTMENT (OUTPATIENT)
Dept: GYNECOLOGIC ONCOLOGY | Facility: CLINIC | Age: 68
End: 2023-11-28
Payer: MEDICARE

## 2023-11-28 VITALS
BODY MASS INDEX: 25.05 KG/M2 | HEART RATE: 64 BPM | HEIGHT: 62 IN | DIASTOLIC BLOOD PRESSURE: 78 MMHG | OXYGEN SATURATION: 96 % | WEIGHT: 136.13 LBS | SYSTOLIC BLOOD PRESSURE: 137 MMHG | TEMPERATURE: 97.6 F

## 2023-11-28 PROCEDURE — 99214 OFFICE O/P EST MOD 30 MIN: CPT

## 2023-12-01 ENCOUNTER — RESULT REVIEW (OUTPATIENT)
Age: 68
End: 2023-12-01

## 2023-12-01 ENCOUNTER — APPOINTMENT (OUTPATIENT)
Age: 68
End: 2023-12-01

## 2023-12-01 LAB
BASOPHILS # BLD AUTO: 0 K/UL — SIGNIFICANT CHANGE UP (ref 0–0.2)
BASOPHILS # BLD AUTO: 0 K/UL — SIGNIFICANT CHANGE UP (ref 0–0.2)
BASOPHILS NFR BLD AUTO: 1.2 % — SIGNIFICANT CHANGE UP (ref 0–2)
BASOPHILS NFR BLD AUTO: 1.2 % — SIGNIFICANT CHANGE UP (ref 0–2)
EOSINOPHIL # BLD AUTO: 0.1 K/UL — SIGNIFICANT CHANGE UP (ref 0–0.5)
EOSINOPHIL # BLD AUTO: 0.1 K/UL — SIGNIFICANT CHANGE UP (ref 0–0.5)
EOSINOPHIL NFR BLD AUTO: 1.3 % — SIGNIFICANT CHANGE UP (ref 0–6)
EOSINOPHIL NFR BLD AUTO: 1.3 % — SIGNIFICANT CHANGE UP (ref 0–6)
HCT VFR BLD CALC: 38.7 % — SIGNIFICANT CHANGE UP (ref 34.5–45)
HCT VFR BLD CALC: 38.7 % — SIGNIFICANT CHANGE UP (ref 34.5–45)
HGB BLD-MCNC: 13.9 G/DL — SIGNIFICANT CHANGE UP (ref 11.5–15.5)
HGB BLD-MCNC: 13.9 G/DL — SIGNIFICANT CHANGE UP (ref 11.5–15.5)
LYMPHOCYTES # BLD AUTO: 0.9 K/UL — LOW (ref 1–3.3)
LYMPHOCYTES # BLD AUTO: 0.9 K/UL — LOW (ref 1–3.3)
LYMPHOCYTES # BLD AUTO: 23.2 % — SIGNIFICANT CHANGE UP (ref 13–44)
LYMPHOCYTES # BLD AUTO: 23.2 % — SIGNIFICANT CHANGE UP (ref 13–44)
MCHC RBC-ENTMCNC: 36 G/DL — SIGNIFICANT CHANGE UP (ref 32–36)
MCHC RBC-ENTMCNC: 36 G/DL — SIGNIFICANT CHANGE UP (ref 32–36)
MCHC RBC-ENTMCNC: 37.6 PG — HIGH (ref 27–34)
MCHC RBC-ENTMCNC: 37.6 PG — HIGH (ref 27–34)
MCV RBC AUTO: 104.6 FL — HIGH (ref 80–100)
MCV RBC AUTO: 104.6 FL — HIGH (ref 80–100)
MONOCYTES # BLD AUTO: 0.4 K/UL — SIGNIFICANT CHANGE UP (ref 0–0.9)
MONOCYTES # BLD AUTO: 0.4 K/UL — SIGNIFICANT CHANGE UP (ref 0–0.9)
MONOCYTES NFR BLD AUTO: 11.3 % — SIGNIFICANT CHANGE UP (ref 2–14)
MONOCYTES NFR BLD AUTO: 11.3 % — SIGNIFICANT CHANGE UP (ref 2–14)
NEUTROPHILS # BLD AUTO: 2.4 K/UL — SIGNIFICANT CHANGE UP (ref 1.8–7.4)
NEUTROPHILS # BLD AUTO: 2.4 K/UL — SIGNIFICANT CHANGE UP (ref 1.8–7.4)
NEUTROPHILS NFR BLD AUTO: 62.9 % — SIGNIFICANT CHANGE UP (ref 43–77)
NEUTROPHILS NFR BLD AUTO: 62.9 % — SIGNIFICANT CHANGE UP (ref 43–77)
PLATELET # BLD AUTO: 144 K/UL — LOW (ref 150–400)
PLATELET # BLD AUTO: 144 K/UL — LOW (ref 150–400)
RBC # BLD: 3.7 M/UL — LOW (ref 3.8–5.2)
RBC # BLD: 3.7 M/UL — LOW (ref 3.8–5.2)
RBC # FLD: 12.8 % — SIGNIFICANT CHANGE UP (ref 10.3–14.5)
RBC # FLD: 12.8 % — SIGNIFICANT CHANGE UP (ref 10.3–14.5)
WBC # BLD: 3.9 K/UL — SIGNIFICANT CHANGE UP (ref 3.8–10.5)
WBC # BLD: 3.9 K/UL — SIGNIFICANT CHANGE UP (ref 3.8–10.5)
WBC # FLD AUTO: 3.9 K/UL — SIGNIFICANT CHANGE UP (ref 3.8–10.5)
WBC # FLD AUTO: 3.9 K/UL — SIGNIFICANT CHANGE UP (ref 3.8–10.5)

## 2023-12-02 LAB
APPEARANCE UR: CLEAR — SIGNIFICANT CHANGE UP
APPEARANCE UR: CLEAR — SIGNIFICANT CHANGE UP
BILIRUB UR-MCNC: NEGATIVE — SIGNIFICANT CHANGE UP
BILIRUB UR-MCNC: NEGATIVE — SIGNIFICANT CHANGE UP
COLOR SPEC: YELLOW — SIGNIFICANT CHANGE UP
COLOR SPEC: YELLOW — SIGNIFICANT CHANGE UP
DIFF PNL FLD: NEGATIVE — SIGNIFICANT CHANGE UP
DIFF PNL FLD: NEGATIVE — SIGNIFICANT CHANGE UP
GLUCOSE UR QL: NEGATIVE MG/DL — SIGNIFICANT CHANGE UP
GLUCOSE UR QL: NEGATIVE MG/DL — SIGNIFICANT CHANGE UP
KETONES UR-MCNC: NEGATIVE MG/DL — SIGNIFICANT CHANGE UP
KETONES UR-MCNC: NEGATIVE MG/DL — SIGNIFICANT CHANGE UP
LEUKOCYTE ESTERASE UR-ACNC: ABNORMAL
LEUKOCYTE ESTERASE UR-ACNC: ABNORMAL
NITRITE UR-MCNC: NEGATIVE — SIGNIFICANT CHANGE UP
NITRITE UR-MCNC: NEGATIVE — SIGNIFICANT CHANGE UP
PH UR: 5.5 — SIGNIFICANT CHANGE UP (ref 5–8)
PH UR: 5.5 — SIGNIFICANT CHANGE UP (ref 5–8)
PROT UR-MCNC: 100 MG/DL
PROT UR-MCNC: 100 MG/DL
SP GR SPEC: 1.02 — SIGNIFICANT CHANGE UP (ref 1–1.03)
SP GR SPEC: 1.02 — SIGNIFICANT CHANGE UP (ref 1–1.03)
UROBILINOGEN FLD QL: 0.2 MG/DL — SIGNIFICANT CHANGE UP (ref 0.2–1)
UROBILINOGEN FLD QL: 0.2 MG/DL — SIGNIFICANT CHANGE UP (ref 0.2–1)

## 2023-12-13 LAB
CREAT 24H UR-MCNC: 0.9 G/24 H
CREAT ?TM UR-MCNC: 42 MG/DL
PROT 24H UR-MRATE: 14 MG/DL
PROT ?TM UR-MCNC: 24 HR
PROT UR-MCNC: 304 MG/24 H
SPECIMEN VOL 24H UR: 2175 ML

## 2023-12-20 ENCOUNTER — APPOINTMENT (OUTPATIENT)
Dept: HEMATOLOGY ONCOLOGY | Facility: CLINIC | Age: 68
End: 2023-12-20
Payer: MEDICARE

## 2023-12-20 ENCOUNTER — RESULT REVIEW (OUTPATIENT)
Age: 68
End: 2023-12-20

## 2023-12-20 VITALS
DIASTOLIC BLOOD PRESSURE: 79 MMHG | SYSTOLIC BLOOD PRESSURE: 166 MMHG | BODY MASS INDEX: 24.93 KG/M2 | WEIGHT: 135.47 LBS | TEMPERATURE: 98 F | OXYGEN SATURATION: 98 % | HEIGHT: 62 IN | HEART RATE: 60 BPM

## 2023-12-20 LAB
BASOPHILS # BLD AUTO: 0 K/UL — SIGNIFICANT CHANGE UP (ref 0–0.2)
BASOPHILS # BLD AUTO: 0 K/UL — SIGNIFICANT CHANGE UP (ref 0–0.2)
BASOPHILS NFR BLD AUTO: 0.9 % — SIGNIFICANT CHANGE UP (ref 0–2)
BASOPHILS NFR BLD AUTO: 0.9 % — SIGNIFICANT CHANGE UP (ref 0–2)
EOSINOPHIL # BLD AUTO: 0.1 K/UL — SIGNIFICANT CHANGE UP (ref 0–0.5)
EOSINOPHIL # BLD AUTO: 0.1 K/UL — SIGNIFICANT CHANGE UP (ref 0–0.5)
EOSINOPHIL NFR BLD AUTO: 1.4 % — SIGNIFICANT CHANGE UP (ref 0–6)
EOSINOPHIL NFR BLD AUTO: 1.4 % — SIGNIFICANT CHANGE UP (ref 0–6)
HCT VFR BLD CALC: 38.9 % — SIGNIFICANT CHANGE UP (ref 34.5–45)
HCT VFR BLD CALC: 38.9 % — SIGNIFICANT CHANGE UP (ref 34.5–45)
HGB BLD-MCNC: 13.3 G/DL — SIGNIFICANT CHANGE UP (ref 11.5–15.5)
HGB BLD-MCNC: 13.3 G/DL — SIGNIFICANT CHANGE UP (ref 11.5–15.5)
LYMPHOCYTES # BLD AUTO: 0.9 K/UL — LOW (ref 1–3.3)
LYMPHOCYTES # BLD AUTO: 0.9 K/UL — LOW (ref 1–3.3)
LYMPHOCYTES # BLD AUTO: 20.2 % — SIGNIFICANT CHANGE UP (ref 13–44)
LYMPHOCYTES # BLD AUTO: 20.2 % — SIGNIFICANT CHANGE UP (ref 13–44)
MCHC RBC-ENTMCNC: 34.2 G/DL — SIGNIFICANT CHANGE UP (ref 32–36)
MCHC RBC-ENTMCNC: 34.2 G/DL — SIGNIFICANT CHANGE UP (ref 32–36)
MCHC RBC-ENTMCNC: 37.9 PG — HIGH (ref 27–34)
MCHC RBC-ENTMCNC: 37.9 PG — HIGH (ref 27–34)
MCV RBC AUTO: 111 FL — HIGH (ref 80–100)
MCV RBC AUTO: 111 FL — HIGH (ref 80–100)
MONOCYTES # BLD AUTO: 0.4 K/UL — SIGNIFICANT CHANGE UP (ref 0–0.9)
MONOCYTES # BLD AUTO: 0.4 K/UL — SIGNIFICANT CHANGE UP (ref 0–0.9)
MONOCYTES NFR BLD AUTO: 10.5 % — SIGNIFICANT CHANGE UP (ref 2–14)
MONOCYTES NFR BLD AUTO: 10.5 % — SIGNIFICANT CHANGE UP (ref 2–14)
NEUTROPHILS # BLD AUTO: 2.9 K/UL — SIGNIFICANT CHANGE UP (ref 1.8–7.4)
NEUTROPHILS # BLD AUTO: 2.9 K/UL — SIGNIFICANT CHANGE UP (ref 1.8–7.4)
NEUTROPHILS NFR BLD AUTO: 66.9 % — SIGNIFICANT CHANGE UP (ref 43–77)
NEUTROPHILS NFR BLD AUTO: 66.9 % — SIGNIFICANT CHANGE UP (ref 43–77)
PLATELET # BLD AUTO: 144 K/UL — LOW (ref 150–400)
PLATELET # BLD AUTO: 144 K/UL — LOW (ref 150–400)
RBC # BLD: 3.51 M/UL — LOW (ref 3.8–5.2)
RBC # BLD: 3.51 M/UL — LOW (ref 3.8–5.2)
RBC # FLD: 13.1 % — SIGNIFICANT CHANGE UP (ref 10.3–14.5)
RBC # FLD: 13.1 % — SIGNIFICANT CHANGE UP (ref 10.3–14.5)
WBC # BLD: 4.3 K/UL — SIGNIFICANT CHANGE UP (ref 3.8–10.5)
WBC # BLD: 4.3 K/UL — SIGNIFICANT CHANGE UP (ref 3.8–10.5)
WBC # FLD AUTO: 4.3 K/UL — SIGNIFICANT CHANGE UP (ref 3.8–10.5)
WBC # FLD AUTO: 4.3 K/UL — SIGNIFICANT CHANGE UP (ref 3.8–10.5)

## 2023-12-20 PROCEDURE — 99214 OFFICE O/P EST MOD 30 MIN: CPT

## 2023-12-20 NOTE — HISTORY OF PRESENT ILLNESS
[Disease: _____________________] : Disease: [unfilled] [Cardiovascular] : Cardiovascular [ENT] : ENT [Dermatologic] : Dermatologic [Endocrine] : Endocrine [Genitourinary] : Genitourinary [Gynecologic] : Gynecologic [Infectious] : Infectious [Pulmonary] : Pulmonary [de-identified] : The patient is a 68 yo F with h/o HTN, hyperlipidemia, GERD, decreased hearing (present since childhood), depression as well as a RAD51c gene mutation s/p prophylactic BSO in 2018 who was diagnosed with high-grade serous carcinoma found in a right side omental nodule in February 2022. She underwent 3 cylces of Carbo/Taxol/Marce followed by interval debulking including a supracervical hysterectomy and omentectomy. Surgical findings did not reveal any gross disease but the uterus was stuck to the rectum.   was initially elevated at 211 on 1/18/22 Tumor demonstrated CK-7, PAX 8, P16, P53, WT1: Positive.  CK-20: Negative  The patient was found to be HRD positive by [employer] testing. She completed an additional 3 cycles of chemotherapy with Marce on 8/4/22 and was started on bevacizumab maintenance on 9/16/22. Olaparib was added given her HRD positive status. [de-identified] : high grade serous [de-identified] : ca125 211 [de-identified] : Patient presents for follow up while on maintenance therapy with Marce and Olaparib for her primary peritoneal carcinoma. She is taking 200mg of Olaparib and Marce Q3 weeks. She initially was on 200mg BID that was escalated to 300mg BID olaparib but required a dose reduction because of mucositis and decreased appetite with significant weight loss so dose was reduced back to 200mg BID.  Friday 12/22 will be C23 of Marce. + Fatigue/weakness, unchanged.  + Mucositis, intermittent. + LBP, hip pain and knee pain, chronic and unchanged. + Mild nausea mostly resolved. + Increased belching, improved. + Intermittent loose stool, unchanged. + Dysgeusia, decreased appetite and easily satiated but weight remains stable, all unchanged. + Nail changes, 2 fingers only, unchanged . + Insomnia. + Xerostomia, improved. + Baseline xeroderma, no worse.  Non-productive intermittent cough mostly resolved. Dyspepsia resolved. No epistaxis, fevers, chills or SOB.  [de-identified] : Arthralgia [de-identified] : fatigue [de-identified] : Neuropathy with pain in LE [de-identified] : Musculoskeletal: [TWNoteComboBox3] : Grade: 2 [de-identified] : Constitutional: [de-identified] : Grade: 1 [de-identified] : Neurologic: [de-identified] : Grade: 2

## 2023-12-20 NOTE — PHYSICAL EXAM
[Restricted in physically strenuous activity but ambulatory and able to carry out work of a light or sedentary nature] : Status 1- Restricted in physically strenuous activity but ambulatory and able to carry out work of a light or sedentary nature, e.g., light house work, office work [Thin] : thin [Normal] : affect appropriate [Ulcers] : no ulcers [Mucositis] : no mucositis [de-identified] : healthy appearing [de-identified] : Patient has bilateral hearing loss. No mucosal lesions [de-identified] : Brisk bilateral upper extremity reflexes: equal and symmetrical, LE absent bilat

## 2023-12-20 NOTE — ASSESSMENT
[Curative] : Goals of care discussed with patient: Curative [FreeTextEntry1] : Primary peritoneal carcinomatosis (158.9) (C48.2) # Primary Peritoneal Cancer, high grade serous, stage III, HRD positive - s/p 3 cycles of neoadjuvant chemotherapy with carbo/taxol/marce. Marce held at cycle 3 in anticipation of surgery. - s/p interval debulking surgery on 6/6/2022. Patient had optimal debulking surgery. - then s/p additional 3 cycles of carbo/taxol, then started on maintenance bevacizumab. - Patient started on olaparib, initially at dose reduction. Patient was tolerating well and dose increased to 300 mg BID. But patient then had mucositis and decreased appetite with significant weight loss so dose was reduced back to 200mg BID. Patient tolerating this dose well, side effects stable. - CBC remains stable, no anemia or neutropenia. Still with slight thrombocytopenia-  K. and her U/A is acceptable and her BP is normal.  - multiple complaints but all are stable and patient feels well overall  - restaging CT on 9/22/23: No evidence of local recurrence or metastatic disease in the chest, abdomen or pelvis.  - Plan to continue with C23 of Marce and 15th month of olaparib this coming Friday  - RTC Q3 weeks for Marce with MD/PA follow up Q3-6 weeks

## 2023-12-20 NOTE — REVIEW OF SYSTEMS
[Patient Intake Form Reviewed] : Patient intake form was reviewed [Fatigue] : fatigue [Constipation] : constipation [Joint Pain] : joint pain [Joint Stiffness] : joint stiffness [Muscle Pain] : muscle pain [Negative] : Allergic/Immunologic [Fever] : no fever [Chills] : no chills [Recent Change In Weight] : ~T no recent weight change [Odynophagia] : no odynophagia [Mucosal Pain] : no mucosal pain [Chest Pain] : no chest pain [Shortness Of Breath] : no shortness of breath [Abdominal Pain] : no abdominal pain [Easy Bleeding] : no tendency for easy bleeding [Easy Bruising] : no tendency for easy bruising [Swollen Glands] : no swollen glands [FreeTextEntry2] : She has improved appetite, is eating better and is cooking for herself more now that she is working reduced hours.  [FreeTextEntry4] : History of hearing deficits [FreeTextEntry7] :  c/o loose stool however not watery in the morning, attributes to eating fatty foods. [FreeTextEntry9] : myalgia is better [de-identified] : LE neuropathy,  improved neuropathy (pain) in bilateral upper thighs

## 2023-12-22 ENCOUNTER — APPOINTMENT (OUTPATIENT)
Age: 68
End: 2023-12-22

## 2023-12-22 RX ORDER — OLAPARIB 100 MG/1
100 TABLET, FILM COATED ORAL
Qty: 360 | Refills: 2 | Status: ACTIVE | COMMUNITY
Start: 2022-09-21 | End: 1900-01-01

## 2024-01-03 LAB
ALBUMIN SERPL ELPH-MCNC: 4.1 G/DL
ALP BLD-CCNC: 63 U/L
ALT SERPL-CCNC: 20 U/L
ANION GAP SERPL CALC-SCNC: 15 MMOL/L
APPEARANCE: ABNORMAL
AST SERPL-CCNC: 24 U/L
BACTERIA: NEGATIVE /HPF
BILIRUB SERPL-MCNC: 0.5 MG/DL
BILIRUBIN URINE: NEGATIVE
BLOOD URINE: NEGATIVE
BUN SERPL-MCNC: 17 MG/DL
CALCIUM SERPL-MCNC: 9 MG/DL
CANCER AG125 SERPL-ACNC: 7 U/ML
CAST: 5 /LPF
CHLORIDE SERPL-SCNC: 104 MMOL/L
CO2 SERPL-SCNC: 23 MMOL/L
COLOR: NORMAL
CREAT SERPL-MCNC: 0.76 MG/DL
EGFR: 86 ML/MIN/1.73M2
EPITHELIAL CELLS: 1 /HPF
GLUCOSE QUALITATIVE U: NEGATIVE MG/DL
GLUCOSE SERPL-MCNC: 98 MG/DL
KETONES URINE: NEGATIVE MG/DL
LEUKOCYTE ESTERASE URINE: NEGATIVE
MAGNESIUM SERPL-MCNC: 1.7 MG/DL
MICROSCOPIC-UA: NORMAL
NITRITE URINE: NEGATIVE
PH URINE: 5
POTASSIUM SERPL-SCNC: 4.6 MMOL/L
PROT SERPL-MCNC: 5.9 G/DL
PROTEIN URINE: 100 MG/DL
RED BLOOD CELLS URINE: 0 /HPF
SODIUM SERPL-SCNC: 142 MMOL/L
SPECIFIC GRAVITY URINE: 1.02
UROBILINOGEN URINE: 0.2 MG/DL
WHITE BLOOD CELLS URINE: 1 /HPF

## 2024-01-04 ENCOUNTER — TRANSCRIPTION ENCOUNTER (OUTPATIENT)
Age: 69
End: 2024-01-04

## 2024-01-05 ENCOUNTER — APPOINTMENT (OUTPATIENT)
Dept: HEMATOLOGY ONCOLOGY | Facility: CLINIC | Age: 69
End: 2024-01-05

## 2024-01-05 ENCOUNTER — RESULT REVIEW (OUTPATIENT)
Age: 69
End: 2024-01-05

## 2024-01-05 LAB
BASOPHILS # BLD AUTO: 0.1 K/UL — SIGNIFICANT CHANGE UP (ref 0–0.2)
BASOPHILS # BLD AUTO: 0.1 K/UL — SIGNIFICANT CHANGE UP (ref 0–0.2)
BASOPHILS NFR BLD AUTO: 1.3 % — SIGNIFICANT CHANGE UP (ref 0–2)
BASOPHILS NFR BLD AUTO: 1.3 % — SIGNIFICANT CHANGE UP (ref 0–2)
EOSINOPHIL # BLD AUTO: 0 K/UL — SIGNIFICANT CHANGE UP (ref 0–0.5)
EOSINOPHIL # BLD AUTO: 0 K/UL — SIGNIFICANT CHANGE UP (ref 0–0.5)
EOSINOPHIL NFR BLD AUTO: 1.1 % — SIGNIFICANT CHANGE UP (ref 0–6)
EOSINOPHIL NFR BLD AUTO: 1.1 % — SIGNIFICANT CHANGE UP (ref 0–6)
HCT VFR BLD CALC: 40.7 % — SIGNIFICANT CHANGE UP (ref 34.5–45)
HCT VFR BLD CALC: 40.7 % — SIGNIFICANT CHANGE UP (ref 34.5–45)
HGB BLD-MCNC: 13.9 G/DL — SIGNIFICANT CHANGE UP (ref 11.5–15.5)
HGB BLD-MCNC: 13.9 G/DL — SIGNIFICANT CHANGE UP (ref 11.5–15.5)
LYMPHOCYTES # BLD AUTO: 1 K/UL — SIGNIFICANT CHANGE UP (ref 1–3.3)
LYMPHOCYTES # BLD AUTO: 1 K/UL — SIGNIFICANT CHANGE UP (ref 1–3.3)
LYMPHOCYTES # BLD AUTO: 23.5 % — SIGNIFICANT CHANGE UP (ref 13–44)
LYMPHOCYTES # BLD AUTO: 23.5 % — SIGNIFICANT CHANGE UP (ref 13–44)
MCHC RBC-ENTMCNC: 34 G/DL — SIGNIFICANT CHANGE UP (ref 32–36)
MCHC RBC-ENTMCNC: 34 G/DL — SIGNIFICANT CHANGE UP (ref 32–36)
MCHC RBC-ENTMCNC: 37.7 PG — HIGH (ref 27–34)
MCHC RBC-ENTMCNC: 37.7 PG — HIGH (ref 27–34)
MCV RBC AUTO: 110.7 FL — HIGH (ref 80–100)
MCV RBC AUTO: 110.7 FL — HIGH (ref 80–100)
MONOCYTES # BLD AUTO: 0.4 K/UL — SIGNIFICANT CHANGE UP (ref 0–0.9)
MONOCYTES # BLD AUTO: 0.4 K/UL — SIGNIFICANT CHANGE UP (ref 0–0.9)
MONOCYTES NFR BLD AUTO: 9.5 % — SIGNIFICANT CHANGE UP (ref 2–14)
MONOCYTES NFR BLD AUTO: 9.5 % — SIGNIFICANT CHANGE UP (ref 2–14)
NEUTROPHILS # BLD AUTO: 2.8 K/UL — SIGNIFICANT CHANGE UP (ref 1.8–7.4)
NEUTROPHILS # BLD AUTO: 2.8 K/UL — SIGNIFICANT CHANGE UP (ref 1.8–7.4)
NEUTROPHILS NFR BLD AUTO: 64.6 % — SIGNIFICANT CHANGE UP (ref 43–77)
NEUTROPHILS NFR BLD AUTO: 64.6 % — SIGNIFICANT CHANGE UP (ref 43–77)
PLATELET # BLD AUTO: 156 K/UL — SIGNIFICANT CHANGE UP (ref 150–400)
PLATELET # BLD AUTO: 156 K/UL — SIGNIFICANT CHANGE UP (ref 150–400)
RBC # BLD: 3.68 M/UL — LOW (ref 3.8–5.2)
RBC # BLD: 3.68 M/UL — LOW (ref 3.8–5.2)
RBC # FLD: 13 % — SIGNIFICANT CHANGE UP (ref 10.3–14.5)
RBC # FLD: 13 % — SIGNIFICANT CHANGE UP (ref 10.3–14.5)
WBC # BLD: 4.3 K/UL — SIGNIFICANT CHANGE UP (ref 3.8–10.5)
WBC # BLD: 4.3 K/UL — SIGNIFICANT CHANGE UP (ref 3.8–10.5)
WBC # FLD AUTO: 4.3 K/UL — SIGNIFICANT CHANGE UP (ref 3.8–10.5)
WBC # FLD AUTO: 4.3 K/UL — SIGNIFICANT CHANGE UP (ref 3.8–10.5)

## 2024-01-08 LAB
ALBUMIN SERPL ELPH-MCNC: 4.4 G/DL
ALP BLD-CCNC: 72 U/L
ALT SERPL-CCNC: 24 U/L
ANION GAP SERPL CALC-SCNC: 13 MMOL/L
APPEARANCE: CLEAR
AST SERPL-CCNC: 25 U/L
BACTERIA: NEGATIVE /HPF
BILIRUB SERPL-MCNC: 0.7 MG/DL
BILIRUBIN URINE: NEGATIVE
BLOOD URINE: NEGATIVE
BUN SERPL-MCNC: 16 MG/DL
CALCIUM SERPL-MCNC: 9.5 MG/DL
CANCER AG125 SERPL-ACNC: 9 U/ML
CAST: 2 /LPF
CHLORIDE SERPL-SCNC: 100 MMOL/L
CO2 SERPL-SCNC: 27 MMOL/L
COLOR: YELLOW
CREAT SERPL-MCNC: 0.74 MG/DL
EGFR: 89 ML/MIN/1.73M2
EPITHELIAL CELLS: 0 /HPF
GLUCOSE QUALITATIVE U: NEGATIVE MG/DL
GLUCOSE SERPL-MCNC: 95 MG/DL
KETONES URINE: NEGATIVE MG/DL
LEUKOCYTE ESTERASE URINE: NEGATIVE
MAGNESIUM SERPL-MCNC: 1.7 MG/DL
MICROSCOPIC-UA: NORMAL
NITRITE URINE: NEGATIVE
PH URINE: 5.5
POTASSIUM SERPL-SCNC: 4.5 MMOL/L
PROT SERPL-MCNC: 6.4 G/DL
PROTEIN URINE: 100 MG/DL
RED BLOOD CELLS URINE: 8 /HPF
REVIEW: NORMAL
SODIUM SERPL-SCNC: 139 MMOL/L
SPECIFIC GRAVITY URINE: 1.02
UROBILINOGEN URINE: 0.2 MG/DL
WHITE BLOOD CELLS URINE: 1 /HPF

## 2024-01-09 ENCOUNTER — APPOINTMENT (OUTPATIENT)
Dept: HEMATOLOGY ONCOLOGY | Facility: CLINIC | Age: 69
End: 2024-01-09

## 2024-01-09 ENCOUNTER — OUTPATIENT (OUTPATIENT)
Dept: OUTPATIENT SERVICES | Facility: HOSPITAL | Age: 69
LOS: 1 days | Discharge: HOME | End: 2024-01-09

## 2024-01-09 ENCOUNTER — APPOINTMENT (OUTPATIENT)
Dept: GYNECOLOGIC ONCOLOGY | Facility: CLINIC | Age: 69
End: 2024-01-09
Payer: MEDICARE

## 2024-01-09 VITALS
DIASTOLIC BLOOD PRESSURE: 65 MMHG | SYSTOLIC BLOOD PRESSURE: 169 MMHG | TEMPERATURE: 98.3 F | OXYGEN SATURATION: 97 % | BODY MASS INDEX: 24.99 KG/M2 | WEIGHT: 135.8 LBS | HEIGHT: 62 IN | HEART RATE: 58 BPM

## 2024-01-09 DIAGNOSIS — C48.2 MALIGNANT NEOPLASM OF PERITONEUM, UNSPECIFIED: ICD-10-CM

## 2024-01-09 DIAGNOSIS — Z90.722 ACQUIRED ABSENCE OF OVARIES, BILATERAL: Chronic | ICD-10-CM

## 2024-01-09 PROCEDURE — 99214 OFFICE O/P EST MOD 30 MIN: CPT

## 2024-01-10 NOTE — REVIEW OF SYSTEMS
[Patient Intake Form Reviewed] : Patient intake form was reviewed [Fatigue] : fatigue [Constipation] : constipation [Joint Pain] : joint pain [Joint Stiffness] : joint stiffness [Muscle Pain] : muscle pain [Negative] : Allergic/Immunologic [Skin Rash] : skin rash [Fever] : no fever [Chills] : no chills [Odynophagia] : no odynophagia [Recent Change In Weight] : ~T no recent weight change [Mucosal Pain] : no mucosal pain [Chest Pain] : no chest pain [Shortness Of Breath] : no shortness of breath [Abdominal Pain] : no abdominal pain [Easy Bleeding] : no tendency for easy bleeding [Easy Bruising] : no tendency for easy bruising [Swollen Glands] : no swollen glands [FreeTextEntry2] : She has improved appetite, is eating better and is cooking for herself more now that she is working reduced hours.  [FreeTextEntry4] : History of hearing deficits [FreeTextEntry7] :  c/o loose stool however not watery in the morning, attributes to eating fatty foods, controlled by immodium when needed [FreeTextEntry9] : myalgia is better [de-identified] : Slight rash on left arm [de-identified] : LE neuropathy,  improved neuropathy, now able to walk around better

## 2024-01-10 NOTE — ADDENDUM
[FreeTextEntry1] :  Documented by Otoniel Hernandez acting as scribe for Dr. Iraheta on  01/09/2024.   All Medical record entries made by the Scribe were at my, Dr. Iraheta's, direction and personally dictated by me on  01/09/2024. I have reviewed the chart and agree that the record accurately reflects my personal performance of the history, physical exam, assessment and plan. I have also personally directed, reviewed, and agreed with the discharge instructions.

## 2024-01-10 NOTE — ASSESSMENT
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  - s/p interval optimal debulking surgery on 6/6/2022. - then s/p additional 3 cycles of carbo/taxol with marce then started on maintenance bevacizumab. - Because of HRD status, the patient was started on olaparib, initially at dose reduction. We attempted increasing the dose to 300 mg BID, but the patient developed mucositis and decreased appetite with significant weight loss so dose was reduced back to 200mg BID. Patient tolerating this dose well, side effects stable. - CBC has been stable, no anemia or neutropenia. Still with slight thrombocytopenia-  K. and her U/A is +2 protein for which we will order a 24 hr protein. Her BP is OK.  - Plan to continue with C24 of Marce and 17th month of olaparib this coming Friday 1/12/24 - RTC Q3 weeks for Marce with MD/PA follow up Q6 weeks - Continue Marce until March 2024, 1.5 years since start of tx - Continue olaparib until 2 years - Ordered bloodwork, CA-125, urine 24-hr specimen  # neuropathy is better in feet and her pain is also better. We will continue to observe. Neuropathy improved since last visit.  # myalgias are also slowly improving.  # proteinuria has been mild and likely related to marce. I will repeat her 24-hr urine for protein but will keep her on schedule with her treatments for now.  RTO with Steve in 3 weeks, with me in 6 weeks

## 2024-01-10 NOTE — RESULTS/DATA
[FreeTextEntry1] : Labs 1/5/24:  stable at 9 Mg 1.7 CBC and CMP are normal U/A 100mg/dl protein  9/22/23: CT Abdomen/pelvis/chest IMPRESSION: No CT evidence of local tumor recurrence or metastatic disease in the chest, abdomen and pelvis

## 2024-01-10 NOTE — HISTORY OF PRESENT ILLNESS
[Disease: _____________________] : Disease: [unfilled] [Cardiovascular] : Cardiovascular [ENT] : ENT [Dermatologic] : Dermatologic [Endocrine] : Endocrine [Genitourinary] : Genitourinary [Gynecologic] : Gynecologic [Infectious] : Infectious [Pulmonary] : Pulmonary [de-identified] : The patient is a 66 yo F with a RAD51c gene mutation s/p prophylactic BSO in 2018 who was diagnosed with high-grade serous carcinoma found in a right side omental nodule in February 2022. She underwent 3 cylces of Carbo/Taxol/Marce followed by interval debulking including a supracervical hysterectomy and omentectomy. Surgical findings did not reveal any gross disease but the uterus was stuck to the rectum.   was initially elevated at 211 on 1/18/22 Tumor demonstrated CK-7, PAX 8, P16, P53, WT1: Positive.  CK-20: Negative  The patient was found to be HRD positive by [employer] testing. She completed an additional 3 cycles of chemotherapy with Marce on 8/4/22 and was started on bevacizumab maintenance on 9/16/22. Olaparib was added given her HRD positive status.  PMH: h/o HTN, hyperlipidemia, GERD, decreased hearing (present since childhood), depression [de-identified] : high grade serous [de-identified] : ca125 211 [de-identified] : genomic instability score is positive (78) [de-identified] : The patient presents for follow up while on maintenance therapy with Marce and Olaparib for her primary peritoneal carcinoma.  The patient is here in anticipation of cycle 24 of maintenance Bevacizumab and 17th month of olaparib this coming Friday. She is currently, taking 200mg of Olaparib and Marce Q3 weeks. Initially, on 200mg BID that was escalated to 300mg BID olaparib but required a dose reduction because of mucositis and decreased appetite with significant weight loss so dose was reduced back to 200mg BID. In office today, she is handling her tx, well, reports the usual side effects, reports loose stools, although not diarrhea, goes to bathroom 1x day, managed with immodium has not been necessary recently.  She denies pain. Reports a rash on left arm.  Neuropathy is better, is now able to walk around by herself without a cane.  [de-identified] : Arthralgia [de-identified] : fatigue [de-identified] : Neuropathy with pain in LE [de-identified] : Musculoskeletal: [TWNoteComboBox3] : Grade: 2 [de-identified] : Constitutional: [de-identified] : Grade: 1 [de-identified] : Neurologic: [de-identified] : Grade: 2

## 2024-01-10 NOTE — PHYSICAL EXAM
[Restricted in physically strenuous activity but ambulatory and able to carry out work of a light or sedentary nature] : Status 1- Restricted in physically strenuous activity but ambulatory and able to carry out work of a light or sedentary nature, e.g., light house work, office work [Thin] : thin [Normal] : affect appropriate [Ulcers] : no ulcers [Mucositis] : no mucositis [de-identified] : healthy appearing [de-identified] : Patient has bilateral hearing loss. No mucosal lesions [de-identified] : no ascites [de-identified] : She has an isolated dry flaky lesion of the left wrist. It is not a "rash" [de-identified] : Brisk bilateral upper extremity reflexes: equal and symmetrical, LE absent bilat

## 2024-01-12 ENCOUNTER — RESULT REVIEW (OUTPATIENT)
Age: 69
End: 2024-01-12

## 2024-01-12 ENCOUNTER — APPOINTMENT (OUTPATIENT)
Age: 69
End: 2024-01-12

## 2024-01-12 LAB
BASOPHILS # BLD AUTO: 0 K/UL — SIGNIFICANT CHANGE UP (ref 0–0.2)
BASOPHILS # BLD AUTO: 0 K/UL — SIGNIFICANT CHANGE UP (ref 0–0.2)
BASOPHILS NFR BLD AUTO: 1.3 % — SIGNIFICANT CHANGE UP (ref 0–2)
BASOPHILS NFR BLD AUTO: 1.3 % — SIGNIFICANT CHANGE UP (ref 0–2)
EOSINOPHIL # BLD AUTO: 0 K/UL — SIGNIFICANT CHANGE UP (ref 0–0.5)
EOSINOPHIL # BLD AUTO: 0 K/UL — SIGNIFICANT CHANGE UP (ref 0–0.5)
EOSINOPHIL NFR BLD AUTO: 1.2 % — SIGNIFICANT CHANGE UP (ref 0–6)
EOSINOPHIL NFR BLD AUTO: 1.2 % — SIGNIFICANT CHANGE UP (ref 0–6)
HCT VFR BLD CALC: 39 % — SIGNIFICANT CHANGE UP (ref 34.5–45)
HCT VFR BLD CALC: 39 % — SIGNIFICANT CHANGE UP (ref 34.5–45)
HGB BLD-MCNC: 13.5 G/DL — SIGNIFICANT CHANGE UP (ref 11.5–15.5)
HGB BLD-MCNC: 13.5 G/DL — SIGNIFICANT CHANGE UP (ref 11.5–15.5)
LYMPHOCYTES # BLD AUTO: 0.8 K/UL — LOW (ref 1–3.3)
LYMPHOCYTES # BLD AUTO: 0.8 K/UL — LOW (ref 1–3.3)
LYMPHOCYTES # BLD AUTO: 22.5 % — SIGNIFICANT CHANGE UP (ref 13–44)
LYMPHOCYTES # BLD AUTO: 22.5 % — SIGNIFICANT CHANGE UP (ref 13–44)
MCHC RBC-ENTMCNC: 34.7 G/DL — SIGNIFICANT CHANGE UP (ref 32–36)
MCHC RBC-ENTMCNC: 34.7 G/DL — SIGNIFICANT CHANGE UP (ref 32–36)
MCHC RBC-ENTMCNC: 37.5 PG — HIGH (ref 27–34)
MCHC RBC-ENTMCNC: 37.5 PG — HIGH (ref 27–34)
MCV RBC AUTO: 108 FL — HIGH (ref 80–100)
MCV RBC AUTO: 108 FL — HIGH (ref 80–100)
MONOCYTES # BLD AUTO: 0.3 K/UL — SIGNIFICANT CHANGE UP (ref 0–0.9)
MONOCYTES # BLD AUTO: 0.3 K/UL — SIGNIFICANT CHANGE UP (ref 0–0.9)
MONOCYTES NFR BLD AUTO: 9.3 % — SIGNIFICANT CHANGE UP (ref 2–14)
MONOCYTES NFR BLD AUTO: 9.3 % — SIGNIFICANT CHANGE UP (ref 2–14)
NEUTROPHILS # BLD AUTO: 2.5 K/UL — SIGNIFICANT CHANGE UP (ref 1.8–7.4)
NEUTROPHILS # BLD AUTO: 2.5 K/UL — SIGNIFICANT CHANGE UP (ref 1.8–7.4)
NEUTROPHILS NFR BLD AUTO: 65.7 % — SIGNIFICANT CHANGE UP (ref 43–77)
NEUTROPHILS NFR BLD AUTO: 65.7 % — SIGNIFICANT CHANGE UP (ref 43–77)
PLATELET # BLD AUTO: 156 K/UL — SIGNIFICANT CHANGE UP (ref 150–400)
PLATELET # BLD AUTO: 156 K/UL — SIGNIFICANT CHANGE UP (ref 150–400)
RBC # BLD: 3.61 M/UL — LOW (ref 3.8–5.2)
RBC # BLD: 3.61 M/UL — LOW (ref 3.8–5.2)
RBC # FLD: 13.1 % — SIGNIFICANT CHANGE UP (ref 10.3–14.5)
RBC # FLD: 13.1 % — SIGNIFICANT CHANGE UP (ref 10.3–14.5)
WBC # BLD: 3.7 K/UL — LOW (ref 3.8–10.5)
WBC # BLD: 3.7 K/UL — LOW (ref 3.8–10.5)
WBC # FLD AUTO: 3.7 K/UL — LOW (ref 3.8–10.5)
WBC # FLD AUTO: 3.7 K/UL — LOW (ref 3.8–10.5)

## 2024-01-13 LAB
APPEARANCE UR: CLEAR — SIGNIFICANT CHANGE UP
APPEARANCE UR: CLEAR — SIGNIFICANT CHANGE UP
BILIRUB UR-MCNC: NEGATIVE — SIGNIFICANT CHANGE UP
BILIRUB UR-MCNC: NEGATIVE — SIGNIFICANT CHANGE UP
COLOR SPEC: YELLOW — SIGNIFICANT CHANGE UP
COLOR SPEC: YELLOW — SIGNIFICANT CHANGE UP
DIFF PNL FLD: NEGATIVE — SIGNIFICANT CHANGE UP
DIFF PNL FLD: NEGATIVE — SIGNIFICANT CHANGE UP
GLUCOSE UR QL: NEGATIVE MG/DL — SIGNIFICANT CHANGE UP
GLUCOSE UR QL: NEGATIVE MG/DL — SIGNIFICANT CHANGE UP
KETONES UR-MCNC: NEGATIVE MG/DL — SIGNIFICANT CHANGE UP
KETONES UR-MCNC: NEGATIVE MG/DL — SIGNIFICANT CHANGE UP
LEUKOCYTE ESTERASE UR-ACNC: NEGATIVE — SIGNIFICANT CHANGE UP
LEUKOCYTE ESTERASE UR-ACNC: NEGATIVE — SIGNIFICANT CHANGE UP
NITRITE UR-MCNC: NEGATIVE — SIGNIFICANT CHANGE UP
NITRITE UR-MCNC: NEGATIVE — SIGNIFICANT CHANGE UP
PH UR: 7 — SIGNIFICANT CHANGE UP (ref 5–8)
PH UR: 7 — SIGNIFICANT CHANGE UP (ref 5–8)
PROT UR-MCNC: 30 MG/DL
PROT UR-MCNC: 30 MG/DL
SP GR SPEC: 1.02 — SIGNIFICANT CHANGE UP (ref 1–1.03)
SP GR SPEC: 1.02 — SIGNIFICANT CHANGE UP (ref 1–1.03)
UROBILINOGEN FLD QL: 1 MG/DL — SIGNIFICANT CHANGE UP (ref 0.2–1)
UROBILINOGEN FLD QL: 1 MG/DL — SIGNIFICANT CHANGE UP (ref 0.2–1)

## 2024-01-16 DIAGNOSIS — Z51.11 ENCOUNTER FOR ANTINEOPLASTIC CHEMOTHERAPY: ICD-10-CM

## 2024-01-16 DIAGNOSIS — R11.2 NAUSEA WITH VOMITING, UNSPECIFIED: ICD-10-CM

## 2024-01-16 LAB
CREAT 24H UR-MCNC: 1 G/24 H
CREAT ?TM UR-MCNC: 63 MG/DL
PROT 24H UR-MRATE: 29 MG/DL
PROT ?TM UR-MCNC: 24 HR
PROT UR-MCNC: 471 MG/24 H
SPECIMEN VOL 24H UR: 1625 ML

## 2024-01-31 ENCOUNTER — APPOINTMENT (OUTPATIENT)
Dept: HEMATOLOGY ONCOLOGY | Facility: CLINIC | Age: 69
End: 2024-01-31
Payer: MEDICARE

## 2024-01-31 ENCOUNTER — RESULT REVIEW (OUTPATIENT)
Age: 69
End: 2024-01-31

## 2024-01-31 VITALS
TEMPERATURE: 98.4 F | DIASTOLIC BLOOD PRESSURE: 71 MMHG | HEART RATE: 53 BPM | HEIGHT: 62 IN | WEIGHT: 133.16 LBS | SYSTOLIC BLOOD PRESSURE: 162 MMHG | BODY MASS INDEX: 24.5 KG/M2 | OXYGEN SATURATION: 100 %

## 2024-01-31 LAB
BASOPHILS # BLD AUTO: 0 K/UL — SIGNIFICANT CHANGE UP (ref 0–0.2)
BASOPHILS NFR BLD AUTO: 1.1 % — SIGNIFICANT CHANGE UP (ref 0–2)
EOSINOPHIL # BLD AUTO: 0 K/UL — SIGNIFICANT CHANGE UP (ref 0–0.5)
EOSINOPHIL NFR BLD AUTO: 1.1 % — SIGNIFICANT CHANGE UP (ref 0–6)
HCT VFR BLD CALC: 39.4 % — SIGNIFICANT CHANGE UP (ref 34.5–45)
HGB BLD-MCNC: 13.8 G/DL — SIGNIFICANT CHANGE UP (ref 11.5–15.5)
LYMPHOCYTES # BLD AUTO: 0.8 K/UL — LOW (ref 1–3.3)
LYMPHOCYTES # BLD AUTO: 23.1 % — SIGNIFICANT CHANGE UP (ref 13–44)
MCHC RBC-ENTMCNC: 35 G/DL — SIGNIFICANT CHANGE UP (ref 32–36)
MCHC RBC-ENTMCNC: 37.4 PG — HIGH (ref 27–34)
MCV RBC AUTO: 106.8 FL — HIGH (ref 80–100)
MONOCYTES # BLD AUTO: 0.4 K/UL — SIGNIFICANT CHANGE UP (ref 0–0.9)
MONOCYTES NFR BLD AUTO: 9.9 % — SIGNIFICANT CHANGE UP (ref 2–14)
NEUTROPHILS # BLD AUTO: 2.4 K/UL — SIGNIFICANT CHANGE UP (ref 1.8–7.4)
NEUTROPHILS NFR BLD AUTO: 64.8 % — SIGNIFICANT CHANGE UP (ref 43–77)
PLATELET # BLD AUTO: 141 K/UL — LOW (ref 150–400)
RBC # BLD: 3.69 M/UL — LOW (ref 3.8–5.2)
RBC # FLD: 12.8 % — SIGNIFICANT CHANGE UP (ref 10.3–14.5)
WBC # BLD: 3.6 K/UL — LOW (ref 3.8–10.5)
WBC # FLD AUTO: 3.6 K/UL — LOW (ref 3.8–10.5)

## 2024-01-31 PROCEDURE — 99214 OFFICE O/P EST MOD 30 MIN: CPT

## 2024-01-31 NOTE — HISTORY OF PRESENT ILLNESS
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 She underwent 3 cylces of Carbo/Taxol/Marce followed by interval debulking including a supracervical hysterectomy and omentectomy. Surgical findings did not reveal any gross disease but the uterus was stuck to the rectum.   was initially elevated at 211 on 1/18/22 Tumor demonstrated CK-7, PAX 8, P16, P53, WT1: Positive.  CK-20: Negative  The patient was found to be HRD positive by [employer] testing. She completed an additional 3 cycles of chemotherapy with Marce on 8/4/22 and was started on bevacizumab maintenance on 9/16/22. Olaparib was added given her HRD positive status.  PMH: h/o HTN, hyperlipidemia, GERD, decreased hearing (present since childhood), depression [de-identified] : high grade serous [de-identified] : ca125 211 [de-identified] : genomic instability score is positive (78) [de-identified] : Patient presents for follow up while on maintenance therapy with Marce and Olaparib for primary peritoneal carcinoma. She is taking 200mg of Olaparib and Marce Q3 weeks. She initially was on Olaparib 200mg BID that was escalated to 300mg BID but required a dose reduction because of mucositis and decreased appetite with significant weight loss, reduced back to 200mg BID. Friday 2/2/24 will be C25 of Marce. + Fatigue/weakness, unchanged.   + LBP, hip pain and knee pain, chronic and unchanged. + Mild nausea Qam, mostly resolved. + Intermittent loose stool, unchanged. + Dysgeusia, decreased appetite and easily satiated, all unchanged with steady mild weight loss. + Nail changes, improved. + Insomnia, improved. + Xerostomia, improved. + Baseline xeroderma, no worse. + Non-productive intermittent cough. Dyspepsia resolved. Mucositis currently resolved. Increased belching resolved. No epistaxis, fevers, chills or SOB. [de-identified] : Arthralgia [de-identified] : fatigue [de-identified] : Neuropathy with pain in LE [de-identified] : Musculoskeletal: [TWNoteComboBox3] : Grade: 2 [de-identified] : Constitutional: [de-identified] : Grade: 1 [de-identified] : Neurologic: [de-identified] : Grade: 2

## 2024-01-31 NOTE — ASSESSMENT
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  - s/p interval optimal debulking surgery on 6/6/2022. - then s/p additional 3 cycles of carbo/taxol with marce then started on maintenance bevacizumab. - Because of HRD status, the patient was started on olaparib, initially at dose reduction. We attempted increasing the dose to 300 mg BID, but the patient developed mucositis and decreased appetite with significant weight loss so dose was reduced back to 200mg BID. Patient tolerating this dose well, side effects stable. - PLTs remain WNL, low normal - will follow up with Dr. Saunders to discuss PT and medical marijuana  - 2/2/24 will be C25 of Marce and start of 18th month of olaparib  - Continue Marce until March 2024, 1.5 years since start of tx - Continue olaparib until 2 years - CMP, CA-125 and UA done today. - RTC Q3 weeks for Marce with MD/PA follow up Q6 weeks  # proteinuria has been mild and likely related to marce.   - Repeat 24-hr urine for protein done on 1/10/24

## 2024-01-31 NOTE — REVIEW OF SYSTEMS
[Patient Intake Form Reviewed] : Patient intake form was reviewed [Fatigue] : fatigue [Constipation] : constipation [Joint Pain] : joint pain [Joint Stiffness] : joint stiffness [Muscle Pain] : muscle pain [Skin Rash] : skin rash [Negative] : Allergic/Immunologic [Fever] : no fever [Chills] : no chills [Recent Change In Weight] : ~T no recent weight change [Odynophagia] : no odynophagia [Mucosal Pain] : no mucosal pain [Chest Pain] : no chest pain [Shortness Of Breath] : no shortness of breath [Abdominal Pain] : no abdominal pain [Easy Bleeding] : no tendency for easy bleeding [Easy Bruising] : no tendency for easy bruising [Swollen Glands] : no swollen glands [FreeTextEntry2] : She has improved appetite, is eating better and is cooking for herself more now that she is working reduced hours.  [FreeTextEntry4] : History of hearing deficits [FreeTextEntry7] :  c/o loose stool however not watery in the morning, attributes to eating fatty foods, controlled by immodium when needed [FreeTextEntry9] : myalgia is better [de-identified] : Slight rash on left arm [de-identified] : LE neuropathy,  improved neuropathy, now able to walk around better

## 2024-01-31 NOTE — PHYSICAL EXAM
[Restricted in physically strenuous activity but ambulatory and able to carry out work of a light or sedentary nature] : Status 1- Restricted in physically strenuous activity but ambulatory and able to carry out work of a light or sedentary nature, e.g., light house work, office work [Thin] : thin [Normal] : affect appropriate [Ulcers] : no ulcers [Mucositis] : no mucositis [de-identified] : healthy appearing [de-identified] : Patient has bilateral hearing loss. No mucosal lesions [de-identified] : no ascites [de-identified] : She has an isolated dry flaky lesion of the left wrist. It is not a "rash" [de-identified] : Brisk bilateral upper extremity reflexes: equal and symmetrical, LE absent bilat

## 2024-02-02 ENCOUNTER — APPOINTMENT (OUTPATIENT)
Age: 69
End: 2024-02-02

## 2024-02-02 ENCOUNTER — RESULT REVIEW (OUTPATIENT)
Age: 69
End: 2024-02-02

## 2024-02-03 LAB
APPEARANCE UR: CLEAR — SIGNIFICANT CHANGE UP
BILIRUB UR-MCNC: NEGATIVE — SIGNIFICANT CHANGE UP
COLOR SPEC: YELLOW — SIGNIFICANT CHANGE UP
DIFF PNL FLD: NEGATIVE — SIGNIFICANT CHANGE UP
GLUCOSE UR QL: NEGATIVE MG/DL — SIGNIFICANT CHANGE UP
KETONES UR-MCNC: NEGATIVE MG/DL — SIGNIFICANT CHANGE UP
LEUKOCYTE ESTERASE UR-ACNC: NEGATIVE — SIGNIFICANT CHANGE UP
NITRITE UR-MCNC: NEGATIVE — SIGNIFICANT CHANGE UP
PH UR: 5.5 — SIGNIFICANT CHANGE UP (ref 5–8)
PROT UR-MCNC: 100 MG/DL
SP GR SPEC: 1.02 — SIGNIFICANT CHANGE UP (ref 1–1.03)
UROBILINOGEN FLD QL: 0.2 MG/DL — SIGNIFICANT CHANGE UP (ref 0.2–1)

## 2024-02-06 LAB
CREAT 24H UR-MCNC: 0.9 G/24 H
CREAT ?TM UR-MCNC: 69 MG/DL
PROT 24H UR-MRATE: 38 MG/DL
PROT ?TM UR-MCNC: 24 HR
PROT UR-MCNC: 513 MG/24 H
SPECIMEN VOL 24H UR: 1350 ML

## 2024-02-12 LAB
ALBUMIN SERPL ELPH-MCNC: 4.2 G/DL
ALP BLD-CCNC: 65 U/L
ALT SERPL-CCNC: 19 U/L
ANION GAP SERPL CALC-SCNC: 9 MMOL/L
APPEARANCE: CLEAR
AST SERPL-CCNC: 21 U/L
BACTERIA: NEGATIVE /HPF
BILIRUB SERPL-MCNC: 0.5 MG/DL
BILIRUBIN URINE: NEGATIVE
BLOOD URINE: NEGATIVE
BUN SERPL-MCNC: 18 MG/DL
CALCIUM SERPL-MCNC: 9 MG/DL
CANCER AG125 SERPL-ACNC: 7 U/ML
CAST: 0 /LPF
CHLORIDE SERPL-SCNC: 104 MMOL/L
CO2 SERPL-SCNC: 27 MMOL/L
COLOR: YELLOW
CREAT SERPL-MCNC: 0.69 MG/DL
EGFR: 95 ML/MIN/1.73M2
EPITHELIAL CELLS: 1 /HPF
GLUCOSE QUALITATIVE U: NEGATIVE MG/DL
GLUCOSE SERPL-MCNC: 98 MG/DL
KETONES URINE: NEGATIVE MG/DL
LEUKOCYTE ESTERASE URINE: NEGATIVE
MAGNESIUM SERPL-MCNC: 1.8 MG/DL
MICROSCOPIC-UA: NORMAL
NITRITE URINE: NEGATIVE
PH URINE: 5.5
POTASSIUM SERPL-SCNC: 4.7 MMOL/L
PROT SERPL-MCNC: 5.9 G/DL
PROTEIN URINE: 100 MG/DL
RED BLOOD CELLS URINE: 0 /HPF
SODIUM SERPL-SCNC: 140 MMOL/L
SPECIFIC GRAVITY URINE: 1.02
UROBILINOGEN URINE: 0.2 MG/DL
WHITE BLOOD CELLS URINE: 0 /HPF

## 2024-02-20 ENCOUNTER — APPOINTMENT (OUTPATIENT)
Dept: GYNECOLOGIC ONCOLOGY | Facility: CLINIC | Age: 69
End: 2024-02-20
Payer: MEDICARE

## 2024-02-20 ENCOUNTER — RESULT REVIEW (OUTPATIENT)
Age: 69
End: 2024-02-20

## 2024-02-20 VITALS
WEIGHT: 133.49 LBS | OXYGEN SATURATION: 97 % | HEIGHT: 62 IN | BODY MASS INDEX: 24.56 KG/M2 | TEMPERATURE: 97.8 F | DIASTOLIC BLOOD PRESSURE: 77 MMHG | SYSTOLIC BLOOD PRESSURE: 154 MMHG | HEART RATE: 65 BPM

## 2024-02-20 DIAGNOSIS — R80.9 PROTEINURIA, UNSPECIFIED: ICD-10-CM

## 2024-02-20 LAB
BASOPHILS # BLD AUTO: 0 K/UL — SIGNIFICANT CHANGE UP (ref 0–0.2)
BASOPHILS NFR BLD AUTO: 1.1 % — SIGNIFICANT CHANGE UP (ref 0–2)
EOSINOPHIL # BLD AUTO: 0.1 K/UL — SIGNIFICANT CHANGE UP (ref 0–0.5)
EOSINOPHIL NFR BLD AUTO: 1.4 % — SIGNIFICANT CHANGE UP (ref 0–6)
HCT VFR BLD CALC: 36 % — SIGNIFICANT CHANGE UP (ref 34.5–45)
HGB BLD-MCNC: 13.5 G/DL — SIGNIFICANT CHANGE UP (ref 11.5–15.5)
LYMPHOCYTES # BLD AUTO: 0.9 K/UL — LOW (ref 1–3.3)
LYMPHOCYTES # BLD AUTO: 23.7 % — SIGNIFICANT CHANGE UP (ref 13–44)
MCHC RBC-ENTMCNC: 37.5 G/DL — HIGH (ref 32–36)
MCHC RBC-ENTMCNC: 39.4 PG — HIGH (ref 27–34)
MCV RBC AUTO: 105.1 FL — HIGH (ref 80–100)
MONOCYTES # BLD AUTO: 0.4 K/UL — SIGNIFICANT CHANGE UP (ref 0–0.9)
MONOCYTES NFR BLD AUTO: 10 % — SIGNIFICANT CHANGE UP (ref 2–14)
NEUTROPHILS # BLD AUTO: 2.5 K/UL — SIGNIFICANT CHANGE UP (ref 1.8–7.4)
NEUTROPHILS NFR BLD AUTO: 63.8 % — SIGNIFICANT CHANGE UP (ref 43–77)
PLATELET # BLD AUTO: 143 K/UL — LOW (ref 150–400)
RBC # BLD: 3.42 M/UL — LOW (ref 3.8–5.2)
RBC # FLD: 13 % — SIGNIFICANT CHANGE UP (ref 10.3–14.5)
WBC # BLD: 3.9 K/UL — SIGNIFICANT CHANGE UP (ref 3.8–10.5)
WBC # FLD AUTO: 3.9 K/UL — SIGNIFICANT CHANGE UP (ref 3.8–10.5)

## 2024-02-20 PROCEDURE — 99214 OFFICE O/P EST MOD 30 MIN: CPT

## 2024-02-20 NOTE — REVIEW OF SYSTEMS
[Patient Intake Form Reviewed] : Patient intake form was reviewed [Fatigue] : fatigue [Constipation] : constipation [Joint Pain] : joint pain [Joint Stiffness] : joint stiffness [Muscle Pain] : muscle pain [Negative] : Integumentary [Fever] : no fever [Chills] : no chills [Recent Change In Weight] : ~T no recent weight change [Odynophagia] : no odynophagia [Mucosal Pain] : no mucosal pain [Chest Pain] : no chest pain [Shortness Of Breath] : no shortness of breath [Abdominal Pain] : no abdominal pain [Skin Rash] : no skin rash [Easy Bleeding] : no tendency for easy bleeding [Easy Bruising] : no tendency for easy bruising [Swollen Glands] : no swollen glands [FreeTextEntry2] : appetite is not great. She takes protein shakes.  [FreeTextEntry4] : History of hearing deficits [FreeTextEntry7] :  c/o loose stool however not watery in the morning, attributes to eating fatty foods, controlled by immodium when needed [FreeTextEntry9] : myalgia is better. unchanged bilateral hip and knee pain. [de-identified] : LE neuropathy,  improved neuropathy, now able to walk around better

## 2024-02-20 NOTE — ASSESSMENT
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  - s/p interval optimal debulking surgery on 6/6/2022. - then s/p additional 3 cycles of carbo/taxol with marce then started on maintenance bevacizumab. - Because of HRD status, the patient was started on olaparib, initially at dose reduction. We attempted increasing the dose to 300 mg BID, but the patient developed mucositis and decreased appetite with significant weight loss so dose was reduced back to 200mg BID. Patient tolerating this dose well, side effects stable. - PLTs remain WNL, low normal - 2/23/24 will be C26 of Marce and start of 19th month of olaparib in March  - This is the next to last Marce cycle, 1.5 years since start of tx. Her next cycle is her last of Marce. - Continue olaparib until 2 years - CMP, CA-125 and UA done today. - RTC Q4 weeks for MD/PA follow up.  - RTO in three weeks.  - Labs are reviewed and acceptable for therapy  # Neuropathy is mild and persists but it does not affect her life.   # proteinuria has been mild and likely related to marce.   - Repeat 24-hr urine for protein done which demonstrated 513 mg protein in 24 hrs. Ok for last treatment.

## 2024-02-20 NOTE — RESULTS/DATA
[FreeTextEntry1] : Labs 2/20/24:  pending WBC 3.9 Hb 13.5 Hct 36% Plt 143K  24hr urine protein: 513 mg/24hr  7 9/22/23: CT Abdomen/pelvis/chest IMPRESSION: No CT evidence of local tumor recurrence or metastatic disease in the chest, abdomen and pelvis

## 2024-02-20 NOTE — PHYSICAL EXAM
[Restricted in physically strenuous activity but ambulatory and able to carry out work of a light or sedentary nature] : Status 1- Restricted in physically strenuous activity but ambulatory and able to carry out work of a light or sedentary nature, e.g., light house work, office work [Thin] : thin [Normal] : affect appropriate [Ulcers] : no ulcers [Mucositis] : no mucositis [de-identified] : healthy appearing [de-identified] : Patient has bilateral hearing loss. No mucosal lesions [de-identified] : no ascites [de-identified] : 2+ bilateral upper and lower extremity reflexes: equal and symmetrical,

## 2024-02-20 NOTE — ADDENDUM
[FreeTextEntry1] : Documented by Brendan Ayala acting as scribe for Dr. Iraheta on  02/20/2024.   All Medical record entries made by the Scribe were at my, Dr. Iraheta's, direction and personally dictated by me on  02/20/2024. I have reviewed the chart and agree that the record accurately reflects my personal performance of the history, physical exam, assessment and plan. I have also personally directed, reviewed, and agreed with the discharge instructions.

## 2024-02-20 NOTE — HISTORY OF PRESENT ILLNESS
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 She underwent 3 cylces of Carbo/Taxol/Marce followed by interval debulking including a supracervical hysterectomy and omentectomy. Surgical findings did not reveal any gross disease but the uterus was stuck to the rectum.  was initially elevated at 211 on 1/18/22 Tumor demonstrated CK-7, PAX 8, P16, P53, WT1: Positive.  CK-20: Negative  The patient was found to be HRD positive by [employer] testing. She completed an additional 3 cycles of chemotherapy with Marce on 8/4/22 and was started on bevacizumab maintenance on 9/16/22. Olaparib was added given her HRD positive status.  PMH: h/o HTN, hyperlipidemia, GERD, decreased hearing (present since childhood), depression [de-identified] : high grade serous [de-identified] : ca125 211 [de-identified] : genomic instability score is positive (78) [de-identified] : Patient presents for follow up after C 25 of maintenance therapy with Marce and Olaparib for primary peritoneal carcinoma. She is taking 200mg of Olaparib and Marce Q3 weeks. She initially was on Olaparib 200mg BID that was escalated to 300mg BID but required a dose reduction because of mucositis and decreased appetite with significant weight loss, reduced back to 200mg BID. In the office today with next treatment of Marce due on due on 2/23/24. She is tolerating the treatment well. Her fatigue and pain in bilateral knees and hip remain unchanged although pain is worse in the cold. Denies worsening neuropathy. She reports intermittent mouth sores that are self-resolving over a few days. Patient reports intermittent loose stools with relief provided through use of Imodium which halts the loose stools after two episodes. Denies black or bloody stools. Appetite is still decreased as she states, "when I look at food, I don't feel anything." She attempts to eat regularly. The patient recently found a protein shake recipe that she enjoys which stimulates her appetite. Denies fevers, chills and urinary symptoms.  [de-identified] : Arthralgia [de-identified] : fatigue [de-identified] : Neuropathy with pain in LE [de-identified] : Musculoskeletal: [TWNoteComboBox3] : Grade: 2 [de-identified] : Constitutional: [de-identified] : Grade: 1 [de-identified] : Neurologic: [de-identified] : Grade: 2

## 2024-02-23 ENCOUNTER — RESULT REVIEW (OUTPATIENT)
Age: 69
End: 2024-02-23

## 2024-02-23 ENCOUNTER — APPOINTMENT (OUTPATIENT)
Age: 69
End: 2024-02-23

## 2024-02-23 LAB
ALBUMIN SERPL ELPH-MCNC: 4.2 G/DL
ALP BLD-CCNC: 64 U/L
ALT SERPL-CCNC: 20 U/L
ANION GAP SERPL CALC-SCNC: 11 MMOL/L
APPEARANCE UR: CLEAR — SIGNIFICANT CHANGE UP
APPEARANCE: CLEAR
AST SERPL-CCNC: 22 U/L
BACTERIA: NEGATIVE /HPF
BASOPHILS # BLD AUTO: 0 K/UL — SIGNIFICANT CHANGE UP (ref 0–0.2)
BASOPHILS NFR BLD AUTO: 1.1 % — SIGNIFICANT CHANGE UP (ref 0–2)
BILIRUB SERPL-MCNC: 0.5 MG/DL
BILIRUB UR-MCNC: NEGATIVE — SIGNIFICANT CHANGE UP
BILIRUBIN URINE: NEGATIVE
BLOOD URINE: NEGATIVE
BUN SERPL-MCNC: 30 MG/DL
CALCIUM SERPL-MCNC: 9.3 MG/DL
CANCER AG125 SERPL-ACNC: 8 U/ML
CAST: 2 /LPF
CHLORIDE SERPL-SCNC: 104 MMOL/L
CO2 SERPL-SCNC: 27 MMOL/L
COLOR SPEC: YELLOW — SIGNIFICANT CHANGE UP
COLOR: NORMAL
CREAT SERPL-MCNC: 0.75 MG/DL
DIFF PNL FLD: NEGATIVE — SIGNIFICANT CHANGE UP
EGFR: 87 ML/MIN/1.73M2
EOSINOPHIL # BLD AUTO: 0.1 K/UL — SIGNIFICANT CHANGE UP (ref 0–0.5)
EOSINOPHIL NFR BLD AUTO: 1.2 % — SIGNIFICANT CHANGE UP (ref 0–6)
EPITHELIAL CELLS: 1 /HPF
GLUCOSE QUALITATIVE U: NEGATIVE MG/DL
GLUCOSE SERPL-MCNC: 89 MG/DL
GLUCOSE UR QL: NEGATIVE MG/DL — SIGNIFICANT CHANGE UP
HCT VFR BLD CALC: 37.8 % — SIGNIFICANT CHANGE UP (ref 34.5–45)
HGB BLD-MCNC: 13.7 G/DL — SIGNIFICANT CHANGE UP (ref 11.5–15.5)
KETONES UR-MCNC: NEGATIVE MG/DL — SIGNIFICANT CHANGE UP
KETONES URINE: NEGATIVE MG/DL
LEUKOCYTE ESTERASE UR-ACNC: NEGATIVE — SIGNIFICANT CHANGE UP
LEUKOCYTE ESTERASE URINE: NEGATIVE
LYMPHOCYTES # BLD AUTO: 1.1 K/UL — SIGNIFICANT CHANGE UP (ref 1–3.3)
LYMPHOCYTES # BLD AUTO: 25.8 % — SIGNIFICANT CHANGE UP (ref 13–44)
MAGNESIUM SERPL-MCNC: 1.7 MG/DL
MCHC RBC-ENTMCNC: 36.1 G/DL — HIGH (ref 32–36)
MCHC RBC-ENTMCNC: 38.7 PG — HIGH (ref 27–34)
MCV RBC AUTO: 107.1 FL — HIGH (ref 80–100)
MICROSCOPIC-UA: NORMAL
MONOCYTES # BLD AUTO: 0.5 K/UL — SIGNIFICANT CHANGE UP (ref 0–0.9)
MONOCYTES NFR BLD AUTO: 11.1 % — SIGNIFICANT CHANGE UP (ref 2–14)
NEUTROPHILS # BLD AUTO: 2.6 K/UL — SIGNIFICANT CHANGE UP (ref 1.8–7.4)
NEUTROPHILS NFR BLD AUTO: 60.8 % — SIGNIFICANT CHANGE UP (ref 43–77)
NITRITE UR-MCNC: NEGATIVE — SIGNIFICANT CHANGE UP
NITRITE URINE: NEGATIVE
PH UR: 7.5 — SIGNIFICANT CHANGE UP (ref 5–8)
PH URINE: 5.5
PLATELET # BLD AUTO: 156 K/UL — SIGNIFICANT CHANGE UP (ref 150–400)
POTASSIUM SERPL-SCNC: 4.3 MMOL/L
PROT SERPL-MCNC: 5.9 G/DL
PROT UR-MCNC: 100 MG/DL
PROTEIN URINE: 100 MG/DL
RBC # BLD: 3.53 M/UL — LOW (ref 3.8–5.2)
RBC # FLD: 13.2 % — SIGNIFICANT CHANGE UP (ref 10.3–14.5)
RED BLOOD CELLS URINE: 0 /HPF
SODIUM SERPL-SCNC: 142 MMOL/L
SP GR SPEC: 1.02 — SIGNIFICANT CHANGE UP (ref 1–1.03)
SPECIFIC GRAVITY URINE: 1.03
UROBILINOGEN FLD QL: 1 MG/DL — SIGNIFICANT CHANGE UP (ref 0.2–1)
UROBILINOGEN URINE: 0.2 MG/DL
WBC # BLD: 4.2 K/UL — SIGNIFICANT CHANGE UP (ref 3.8–10.5)
WBC # FLD AUTO: 4.2 K/UL — SIGNIFICANT CHANGE UP (ref 3.8–10.5)
WHITE BLOOD CELLS URINE: 1 /HPF

## 2024-02-23 RX ORDER — SERTRALINE 25 MG/1
1 TABLET, FILM COATED ORAL
Qty: 0 | Refills: 0 | DISCHARGE

## 2024-02-23 RX ORDER — ATORVASTATIN CALCIUM 80 MG/1
1 TABLET, FILM COATED ORAL
Qty: 0 | Refills: 0 | DISCHARGE

## 2024-02-23 RX ORDER — LOSARTAN POTASSIUM 100 MG/1
1 TABLET, FILM COATED ORAL
Qty: 0 | Refills: 0 | DISCHARGE

## 2024-02-23 RX ORDER — PANTOPRAZOLE SODIUM 20 MG/1
1 TABLET, DELAYED RELEASE ORAL
Qty: 0 | Refills: 0 | DISCHARGE

## 2024-02-24 LAB
BACTERIA # UR AUTO: NEGATIVE /HPF — SIGNIFICANT CHANGE UP
CAST: 0 /LPF — SIGNIFICANT CHANGE UP (ref 0–4)
CREAT ?TM UR-MCNC: 80 MG/DL — SIGNIFICANT CHANGE UP
PROT ?TM UR-MCNC: 66 MG/DL — HIGH (ref 0–12)
PROT/CREAT UR-RTO: 0.8 RATIO — HIGH (ref 0–0.2)
RBC CASTS # UR COMP ASSIST: 1 /HPF — SIGNIFICANT CHANGE UP (ref 0–4)
SQUAMOUS # UR AUTO: 1 /HPF — SIGNIFICANT CHANGE UP (ref 0–5)
WBC UR QL: 2 /HPF — SIGNIFICANT CHANGE UP (ref 0–5)

## 2024-03-01 ENCOUNTER — APPOINTMENT (OUTPATIENT)
Dept: PHYSICAL MEDICINE AND REHAB | Facility: CLINIC | Age: 69
End: 2024-03-01
Payer: MEDICARE

## 2024-03-01 DIAGNOSIS — M25.569 PAIN IN UNSPECIFIED KNEE: ICD-10-CM

## 2024-03-01 PROCEDURE — 99214 OFFICE O/P EST MOD 30 MIN: CPT

## 2024-03-01 RX ORDER — GABAPENTIN 100 MG/1
100 CAPSULE ORAL 3 TIMES DAILY
Qty: 180 | Refills: 2 | Status: ACTIVE | COMMUNITY
Start: 2023-03-03 | End: 1900-01-01

## 2024-03-01 NOTE — PHYSICAL EXAM
[FreeTextEntry1] : Gen: Patient is A&O x 3, NAD HEENT: EOMI, hearing grossly normal Resp: regular, non - labored CV: pulses regular Skin: no rashes, erythema Lymph: no clubbing, cyanosis, edema Inspection: no instability  ROM: full throughout Palpation:  No TTP bilateral knee joints  Sensation: mild decrease in LE to light touch distally   Reflexes: 1+ and symmetric throughout Strength: 5/5 throughout Special tests: -straight leg raise, no clonus, -impingement tests to bilateral knees. -facet loading to back Gait: antalgic, wide gait

## 2024-03-01 NOTE — END OF VISIT
[] : Fellow [FreeTextEntry3] : I have personally seen and examined the patient. I fully participated in the care of this patient. I have made amendments to the documentation where necessary, and agree with the history, physical exam, and plan as documented by the Fellow, Dr. Aparicio.

## 2024-03-01 NOTE — DATA REVIEWED
[FreeTextEntry1] : 2/2024 labs: eGFR 87 mL/min/1.73m2  9/2023 CT A/P: No evidence of local recurrence or metastatic disease in the chest, abdomen or pelvis.

## 2024-03-01 NOTE — HISTORY OF PRESENT ILLNESS
[FreeTextEntry1] : Ms. Retana is a 68-year-old female with Primary Peritoneal Cancer, high grade serous, stage III, HRD positive.  S/p 3 cycles of neoadjuvant chemotherapy with carbo/taxol/estuardo.  S/p interval debulking surgery on 6/6/2022.  Then s/p additional 3 cycles of carbo/taxol, then started on maintenance bevacizumab. Patient is on olaparib (started on 9/2022).  She stopped gabapentin in 6/2023 and she was okay, but symptoms returned recently. She is unsure exactly when the symptoms came back but her  recommended trying gabapentin again due to insomnia and leg cramping. She restarted Gabapentin 200 mg TID for a week. She said her  said she is sleeping better and getting less cramping.   She also has decreased appetite.   She no longer uses the neoprene brace. She is having trouble walking long distances and lifting objects. She endorses diffuse weakness, no focal weakness. She uses a cane to ambulate. She would like to try PT.   Denies any cardiac history or lower blood pressures.

## 2024-03-01 NOTE — REVIEW OF SYSTEMS
[Recent Change In Weight] : ~T recent weight change [Difficulty Walking] : difficulty walking [Negative] : Musculoskeletal [FreeTextEntry2] : insomnia. Decreased appetite [de-identified] : neuropathy symptoms

## 2024-03-01 NOTE — ASSESSMENT
[FreeTextEntry1] : 68-year-old female presenting for evaluation.  #Decreased appetite/Peritoneal carcinomatosis:  -Discussed risks and benefits of medical cannabis  -Discussed risks and benefits of medical cannabis, including fall risk. Medical cannabis certification completed today. Provided cannabis education, overview of state program, and discussed adverse effects in detail. Counseled that vaporized cannabis is not the preferred route of administration due to the fact that both short-term and long-term risks associated with vaporizing oils are not yet fully understood. Recommend starting with 1:1 THC:CBD formulation at low dose of THC (~2mg/dose). -I Stop # [id number]  #Neuropathy: -Restart gabapentin 200mg TID-rx sent   #Impaired gait/balance: -Start PT to work on home exercise plan, strengthening, range of motion, gait and fall prevention/safety  Follow up in 6 weeks

## 2024-03-06 ENCOUNTER — NON-APPOINTMENT (OUTPATIENT)
Age: 69
End: 2024-03-06

## 2024-03-07 ENCOUNTER — APPOINTMENT (OUTPATIENT)
Dept: GYNECOLOGIC ONCOLOGY | Facility: CLINIC | Age: 69
End: 2024-03-07
Payer: MEDICARE

## 2024-03-07 VITALS
OXYGEN SATURATION: 97 % | SYSTOLIC BLOOD PRESSURE: 136 MMHG | HEART RATE: 62 BPM | DIASTOLIC BLOOD PRESSURE: 73 MMHG | RESPIRATION RATE: 16 BRPM | WEIGHT: 130 LBS | HEIGHT: 61 IN | BODY MASS INDEX: 24.55 KG/M2

## 2024-03-07 PROCEDURE — 99213 OFFICE O/P EST LOW 20 MIN: CPT

## 2024-03-07 PROCEDURE — G2211 COMPLEX E/M VISIT ADD ON: CPT

## 2024-03-07 PROCEDURE — 99459 PELVIC EXAMINATION: CPT

## 2024-03-08 ENCOUNTER — OUTPATIENT (OUTPATIENT)
Dept: OUTPATIENT SERVICES | Facility: HOSPITAL | Age: 69
LOS: 1 days | Discharge: HOME | End: 2024-03-08

## 2024-03-08 DIAGNOSIS — Z98.51 TUBAL LIGATION STATUS: Chronic | ICD-10-CM

## 2024-03-08 DIAGNOSIS — Z96.21 COCHLEAR IMPLANT STATUS: Chronic | ICD-10-CM

## 2024-03-08 DIAGNOSIS — Z90.722 ACQUIRED ABSENCE OF OVARIES, BILATERAL: Chronic | ICD-10-CM

## 2024-03-08 DIAGNOSIS — C48.2 MALIGNANT NEOPLASM OF PERITONEUM, UNSPECIFIED: ICD-10-CM

## 2024-03-08 DIAGNOSIS — Z98.890 OTHER SPECIFIED POSTPROCEDURAL STATES: Chronic | ICD-10-CM

## 2024-03-12 ENCOUNTER — RESULT REVIEW (OUTPATIENT)
Age: 69
End: 2024-03-12

## 2024-03-12 ENCOUNTER — APPOINTMENT (OUTPATIENT)
Dept: HEMATOLOGY ONCOLOGY | Facility: CLINIC | Age: 69
End: 2024-03-12

## 2024-03-12 ENCOUNTER — APPOINTMENT (OUTPATIENT)
Dept: GYNECOLOGIC ONCOLOGY | Facility: CLINIC | Age: 69
End: 2024-03-12
Payer: MEDICARE

## 2024-03-12 ENCOUNTER — LABORATORY RESULT (OUTPATIENT)
Age: 69
End: 2024-03-12

## 2024-03-12 VITALS
WEIGHT: 132.94 LBS | DIASTOLIC BLOOD PRESSURE: 77 MMHG | SYSTOLIC BLOOD PRESSURE: 154 MMHG | TEMPERATURE: 97.8 F | HEIGHT: 61 IN | HEART RATE: 62 BPM | OXYGEN SATURATION: 99 % | BODY MASS INDEX: 25.1 KG/M2

## 2024-03-12 LAB
ALBUMIN SERPL ELPH-MCNC: 4.1 G/DL
ALP BLD-CCNC: 64 U/L
ALT SERPL-CCNC: 26 U/L
ANION GAP SERPL CALC-SCNC: 10 MMOL/L
APPEARANCE: CLEAR
AST SERPL-CCNC: 24 U/L
BASOPHILS # BLD AUTO: 0.1 K/UL — SIGNIFICANT CHANGE UP (ref 0–0.2)
BASOPHILS NFR BLD AUTO: 2.1 % — HIGH (ref 0–2)
BILIRUB SERPL-MCNC: 0.7 MG/DL
BILIRUBIN URINE: NEGATIVE
BLOOD URINE: NEGATIVE
BUN SERPL-MCNC: 23 MG/DL
CALCIUM SERPL-MCNC: 9.3 MG/DL
CANCER AG125 SERPL-ACNC: 7 U/ML
CHLORIDE SERPL-SCNC: 103 MMOL/L
CO2 SERPL-SCNC: 28 MMOL/L
COLOR: NORMAL
CREAT SERPL-MCNC: 0.76 MG/DL
EGFR: 86 ML/MIN/1.73M2
EOSINOPHIL # BLD AUTO: 0.1 K/UL — SIGNIFICANT CHANGE UP (ref 0–0.5)
EOSINOPHIL NFR BLD AUTO: 2 % — SIGNIFICANT CHANGE UP (ref 0–6)
GLUCOSE QUALITATIVE U: NEGATIVE MG/DL
GLUCOSE SERPL-MCNC: 102 MG/DL
HCT VFR BLD CALC: 38.1 % — SIGNIFICANT CHANGE UP (ref 34.5–45)
HGB BLD-MCNC: 13.5 G/DL — SIGNIFICANT CHANGE UP (ref 11.5–15.5)
KETONES URINE: NEGATIVE MG/DL
LEUKOCYTE ESTERASE URINE: NEGATIVE
LYMPHOCYTES # BLD AUTO: 0.7 K/UL — LOW (ref 1–3.3)
LYMPHOCYTES # BLD AUTO: 17.1 % — SIGNIFICANT CHANGE UP (ref 13–44)
MAGNESIUM SERPL-MCNC: 1.7 MG/DL
MCHC RBC-ENTMCNC: 35.3 G/DL — SIGNIFICANT CHANGE UP (ref 32–36)
MCHC RBC-ENTMCNC: 38.7 PG — HIGH (ref 27–34)
MCV RBC AUTO: 109.5 FL — HIGH (ref 80–100)
MONOCYTES # BLD AUTO: 0.4 K/UL — SIGNIFICANT CHANGE UP (ref 0–0.9)
MONOCYTES NFR BLD AUTO: 10.2 % — SIGNIFICANT CHANGE UP (ref 2–14)
NEUTROPHILS # BLD AUTO: 2.6 K/UL — SIGNIFICANT CHANGE UP (ref 1.8–7.4)
NEUTROPHILS NFR BLD AUTO: 68.6 % — SIGNIFICANT CHANGE UP (ref 43–77)
NITRITE URINE: NEGATIVE
PH URINE: 5.5
PLATELET # BLD AUTO: 148 K/UL — LOW (ref 150–400)
POTASSIUM SERPL-SCNC: 4.7 MMOL/L
PROT SERPL-MCNC: 6 G/DL
PROTEIN URINE: 100 MG/DL
RBC # BLD: 3.48 M/UL — LOW (ref 3.8–5.2)
RBC # FLD: 13.1 % — SIGNIFICANT CHANGE UP (ref 10.3–14.5)
SODIUM SERPL-SCNC: 141 MMOL/L
SPECIFIC GRAVITY URINE: 1.02
UROBILINOGEN URINE: 0.2 MG/DL
WBC # BLD: 3.8 K/UL — SIGNIFICANT CHANGE UP (ref 3.8–10.5)
WBC # FLD AUTO: 3.8 K/UL — SIGNIFICANT CHANGE UP (ref 3.8–10.5)

## 2024-03-12 PROCEDURE — 99214 OFFICE O/P EST MOD 30 MIN: CPT

## 2024-03-12 PROCEDURE — G2211 COMPLEX E/M VISIT ADD ON: CPT

## 2024-03-12 NOTE — HISTORY OF PRESENT ILLNESS
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 She underwent 3 cylces of Carbo/Taxol/Marce followed by interval debulking including a supracervical hysterectomy and omentectomy. Surgical findings did not reveal any gross disease but the uterus was stuck to the rectum.  was initially elevated at 211 on 1/18/22 Tumor demonstrated CK-7, PAX 8, P16, P53, WT1: Positive.  CK-20: Negative  The patient was found to be HRD positive by [employer] testing. She completed an additional 3 cycles of chemotherapy with Marce on 8/4/22 and was started on bevacizumab maintenance on 9/16/22. Olaparib was added given her HRD positive status.  PMH: h/o HTN, hyperlipidemia, GERD, decreased hearing (present since childhood), depression [de-identified] : high grade serous [de-identified] : ca125 211 [de-identified] : genomic instability score is positive (78) [de-identified] : Patient presents for follow up after C26 of maintenance therapy with Marce and Olaparib for primary peritoneal carcinoma. She is taking 200mg of Olaparib and Marce Q3 weeks. Pt now w/ 1 final treatment of Bevacizumab (plan for last tx 3/15/24). She initially was on Olaparib 200mg BID that was escalated to 300mg BID but required a dose reduction because of mucositis and decreased appetite with significant weight loss, reduced back to 200mg BID.   Remains on Olaparib until 2yrs. She is tolerating the treatment well. Her fatigue and pain in bilateral knees and hip remain unchanged although pain is worse in the cold. Denies worsening neuropathy. She reports intermittent mouth sores that are self-resolving over a few days. Patient reports intermittent loose stools with relief provided through use of Imodium which halts the loose stools after two episodes. Denies black or bloody stools. Appetite is still decreased as she states, "when I look at food, I don't feel anything." She attempts to eat regularly. The patient recently found a protein shake recipe that she enjoys which stimulates her appetite. Denies fevers, chills and urinary symptoms.  Pt now s/p appt w/ Dr. Saunders, prescribed medical cannabis but has not yet picked it up. Planning to pick it up later this week and optimistic it may assist with both appetite and sleep. [de-identified] : Arthralgia [de-identified] : fatigue [de-identified] : Neuropathy with pain in LE [de-identified] : Musculoskeletal: [TWNoteComboBox3] : Grade: 2 [de-identified] : Constitutional: [de-identified] : Grade: 1 [de-identified] : Neurologic: [de-identified] : Grade: 2

## 2024-03-12 NOTE — ASSESSMENT
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  - s/p interval optimal debulking surgery on 6/6/2022. - then s/p additional 3 cycles of carbo/taxol with marce then started on maintenance bevacizumab. - Because of HRD status, the patient was started on olaparib, initially at dose reduction. We attempted increasing the dose to 300 mg BID, but the patient developed mucositis and decreased appetite with significant weight loss so dose was reduced back to 200mg BID. Patient tolerating this dose well, side effects stable. - PLTs remain WNL, low normal - Due for final dose of Marce maintenance on 3/15/24 (C26 2/23/24) - Remains on Olaparib (19th month in March, plan for 2 yrs total) - Continue olaparib until 2 years - CMP, CA-125 and UA done today. - RTC Q4 weeks for MD/PA follow up.  - RTO in three weeks.  - Labs are reviewed and acceptable for therapy  # Neuropathy is mild and persists but it does not affect her life.   # proteinuria has been mild and likely related to marce.   - Repeat 24-hr urine for protein done which demonstrated 513 mg protein in 24 hrs. Ok for last treatment.

## 2024-03-12 NOTE — PHYSICAL EXAM
[Restricted in physically strenuous activity but ambulatory and able to carry out work of a light or sedentary nature] : Status 1- Restricted in physically strenuous activity but ambulatory and able to carry out work of a light or sedentary nature, e.g., light house work, office work [Thin] : thin [Normal] : affect appropriate [Ulcers] : no ulcers [de-identified] : healthy appearing [Mucositis] : no mucositis [de-identified] : Patient has bilateral hearing loss. No mucosal lesions [de-identified] : no ascites [de-identified] : 2+ bilateral upper and lower extremity reflexes: equal and symmetrical,

## 2024-03-12 NOTE — REVIEW OF SYSTEMS
[Patient Intake Form Reviewed] : Patient intake form was reviewed [Fatigue] : fatigue [Joint Pain] : joint pain [Constipation] : constipation [Muscle Pain] : muscle pain [Joint Stiffness] : joint stiffness [Negative] : Heme/Lymph [Fever] : no fever [Chills] : no chills [Recent Change In Weight] : ~T no recent weight change [Odynophagia] : no odynophagia [Mucosal Pain] : no mucosal pain [Chest Pain] : no chest pain [Shortness Of Breath] : no shortness of breath [Abdominal Pain] : no abdominal pain [Skin Rash] : no skin rash [Easy Bleeding] : no tendency for easy bleeding [Easy Bruising] : no tendency for easy bruising [Swollen Glands] : no swollen glands [FreeTextEntry2] : appetite remains poor; takes protein shakes.  [FreeTextEntry4] : History of hearing deficits [FreeTextEntry7] :  c/o loose stool however not watery in the morning, attributes to eating fatty foods, controlled by immodium when needed [FreeTextEntry9] : myalgia is better. unchanged bilateral hip and knee pain. [de-identified] : LE neuropathy,  improved neuropathy, now able to walk around better

## 2024-03-13 NOTE — PLAN
[TextEntry] : Follow up PMD as needed for ongoing medical issues Imaging as clinically indicated The risk of recurrence, signs and symptoms  and surveillance plan were reviewed in detail.  Return in 4 months or PRN if symptoms arise. All questions were answered to her apparent satisfaction.

## 2024-03-13 NOTE — REASON FOR VISIT
[FreeTextEntry1] : [facility] [place]. 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

## 2024-03-13 NOTE — ASSESSMENT
[FreeTextEntry1] : Patient is doing well from a gynecological standpoint. She will be completing Bevacizumab next week. She will complete Lynparza once she has been on it for 2 years. She reports her appetite is not so good. She eats greek yogurt with honey and some fruits, she states by dinner time sometimes she forgets to eat.   Patient going to [facility] in April.    Clinically KHUSHI.

## 2024-03-13 NOTE — PHYSICAL EXAM
[Chaperone Present] : A chaperone was present in the examining room during all aspects of the physical examination [Normal] : Recto-Vaginal Exam: Normal [FreeTextEntry1] : Xuan Shearer Medical assistant chaperoned during gynecologic exam.  [Absent] : Adnexa(ae): Absent

## 2024-03-13 NOTE — END OF VISIT
[FreeTextEntry3] : Written by Xuan Shearer, acting as a scribe for Dr. Urmila Murillo This note accurately reflects the work and decisions made by me.

## 2024-03-13 NOTE — HISTORY OF PRESENT ILLNESS
[FreeTextEntry1] : 69 y/o patient with Stage III High Grade Serous PPC, s/p Ex-Lap Supracervical hysterectomy, omentectomy at [facility] on 6/6/22 with Dr. Jone Flores. She is currently undergoing maintenance treatment with Dr. Iraheta, s/p completion of systemic therapy with Carbo/Taxol on 8/25/22. Patient is currently on maintenance Bevacizumab and Lynparza. She returns to the office today for a routine svl visit.  Ca125 2/20/24 was 8  CT Chest abdomen and pelvis 9/22/23: IMPRESSION: No evidence of local recurrence or metastatic disease in the chest, abdomen or pelvis.  Health Maintenance: Mammo (11/21/22) - BI-RADS 2, benign Colonoscopy/EDG (5/2018) - Normal colonoscopy return PRN; repeat EDG in 3 years due to polyps (one w/ minimal reactive gastropathy)

## 2024-03-15 ENCOUNTER — APPOINTMENT (OUTPATIENT)
Age: 69
End: 2024-03-15

## 2024-03-18 DIAGNOSIS — R11.2 NAUSEA WITH VOMITING, UNSPECIFIED: ICD-10-CM

## 2024-03-18 DIAGNOSIS — Z51.11 ENCOUNTER FOR ANTINEOPLASTIC CHEMOTHERAPY: ICD-10-CM

## 2024-04-02 ENCOUNTER — APPOINTMENT (OUTPATIENT)
Dept: GYNECOLOGIC ONCOLOGY | Facility: CLINIC | Age: 69
End: 2024-04-02
Payer: MEDICARE

## 2024-04-02 ENCOUNTER — RESULT REVIEW (OUTPATIENT)
Age: 69
End: 2024-04-02

## 2024-04-02 ENCOUNTER — LABORATORY RESULT (OUTPATIENT)
Age: 69
End: 2024-04-02

## 2024-04-02 VITALS
SYSTOLIC BLOOD PRESSURE: 150 MMHG | OXYGEN SATURATION: 97 % | DIASTOLIC BLOOD PRESSURE: 82 MMHG | HEIGHT: 61 IN | HEART RATE: 61 BPM | WEIGHT: 133.16 LBS | BODY MASS INDEX: 25.14 KG/M2 | TEMPERATURE: 97.3 F

## 2024-04-02 LAB
BASOPHILS # BLD AUTO: 0.1 K/UL — SIGNIFICANT CHANGE UP (ref 0–0.2)
BASOPHILS NFR BLD AUTO: 1.4 % — SIGNIFICANT CHANGE UP (ref 0–2)
EOSINOPHIL # BLD AUTO: 0.1 K/UL — SIGNIFICANT CHANGE UP (ref 0–0.5)
EOSINOPHIL NFR BLD AUTO: 2.3 % — SIGNIFICANT CHANGE UP (ref 0–6)
HCT VFR BLD CALC: 39.5 % — SIGNIFICANT CHANGE UP (ref 34.5–45)
HGB BLD-MCNC: 14 G/DL — SIGNIFICANT CHANGE UP (ref 11.5–15.5)
LYMPHOCYTES # BLD AUTO: 0.9 K/UL — LOW (ref 1–3.3)
LYMPHOCYTES # BLD AUTO: 22.2 % — SIGNIFICANT CHANGE UP (ref 13–44)
MCHC RBC-ENTMCNC: 35.6 G/DL — SIGNIFICANT CHANGE UP (ref 32–36)
MCHC RBC-ENTMCNC: 38.6 PG — HIGH (ref 27–34)
MCV RBC AUTO: 108.4 FL — HIGH (ref 80–100)
MONOCYTES # BLD AUTO: 0.4 K/UL — SIGNIFICANT CHANGE UP (ref 0–0.9)
MONOCYTES NFR BLD AUTO: 10.7 % — SIGNIFICANT CHANGE UP (ref 2–14)
NEUTROPHILS # BLD AUTO: 2.5 K/UL — SIGNIFICANT CHANGE UP (ref 1.8–7.4)
NEUTROPHILS NFR BLD AUTO: 63.4 % — SIGNIFICANT CHANGE UP (ref 43–77)
PLATELET # BLD AUTO: 137 K/UL — LOW (ref 150–400)
RBC # BLD: 3.64 M/UL — LOW (ref 3.8–5.2)
RBC # FLD: 13 % — SIGNIFICANT CHANGE UP (ref 10.3–14.5)
WBC # BLD: 3.9 K/UL — SIGNIFICANT CHANGE UP (ref 3.8–10.5)
WBC # FLD AUTO: 3.9 K/UL — SIGNIFICANT CHANGE UP (ref 3.8–10.5)

## 2024-04-02 PROCEDURE — G2211 COMPLEX E/M VISIT ADD ON: CPT

## 2024-04-02 PROCEDURE — 99214 OFFICE O/P EST MOD 30 MIN: CPT

## 2024-04-04 LAB
ALBUMIN SERPL ELPH-MCNC: 4.1 G/DL
ALP BLD-CCNC: 70 U/L
ALT SERPL-CCNC: 31 U/L
ANION GAP SERPL CALC-SCNC: 10 MMOL/L
APPEARANCE: CLEAR
AST SERPL-CCNC: 29 U/L
BILIRUB SERPL-MCNC: 0.6 MG/DL
BILIRUBIN URINE: NEGATIVE
BLOOD URINE: NEGATIVE
BUN SERPL-MCNC: 20 MG/DL
CALCIUM SERPL-MCNC: 9.3 MG/DL
CANCER AG125 SERPL-ACNC: 7 U/ML
CHLORIDE SERPL-SCNC: 102 MMOL/L
CO2 SERPL-SCNC: 29 MMOL/L
COLOR: YELLOW
CREAT SERPL-MCNC: 0.79 MG/DL
CREAT SPEC-SCNC: 103 MG/DL
CREAT/PROT UR: 1.1 RATIO
EGFR: 82 ML/MIN/1.73M2
GLUCOSE QUALITATIVE U: NEGATIVE MG/DL
GLUCOSE SERPL-MCNC: 85 MG/DL
KETONES URINE: NEGATIVE MG/DL
LEUKOCYTE ESTERASE URINE: NEGATIVE
MAGNESIUM SERPL-MCNC: 1.8 MG/DL
NITRITE URINE: NEGATIVE
PH URINE: 6
POTASSIUM SERPL-SCNC: 4.6 MMOL/L
PROT SERPL-MCNC: 5.8 G/DL
PROT UR-MCNC: 115 MG/DL
PROTEIN URINE: 100 MG/DL
SODIUM SERPL-SCNC: 141 MMOL/L
SPECIFIC GRAVITY URINE: 1.02
UROBILINOGEN URINE: 0.2 MG/DL

## 2024-04-05 ENCOUNTER — APPOINTMENT (OUTPATIENT)
Age: 69
End: 2024-04-05

## 2024-04-06 NOTE — RESULTS/DATA
[FreeTextEntry1] : Labs 4/2/24: WBC 3.9 Hb 14.0 Hct 39.5% Plt 137K CMP normal except total protein is low at 5.8 Mg 1.8  7 UPCR 1.1  9/22/23: CT Abdomen/pelvis/chest IMPRESSION: No CT evidence of local tumor recurrence or metastatic disease in the chest, abdomen and pelvis

## 2024-04-06 NOTE — PHYSICAL EXAM
[Restricted in physically strenuous activity but ambulatory and able to carry out work of a light or sedentary nature] : Status 1- Restricted in physically strenuous activity but ambulatory and able to carry out work of a light or sedentary nature, e.g., light house work, office work [Thin] : thin [Ulcers] : no ulcers [Mucositis] : no mucositis [Normal] : normal appearance, no rash, nodules, vesicles, ulcers, erythema [de-identified] : healthy appearing [de-identified] : Patient has bilateral hearing loss. No mucosal lesions [de-identified] : no ascites [de-identified] : 2+ bilateral upper and lower extremity reflexes: equal and symmetrical,

## 2024-04-06 NOTE — REVIEW OF SYSTEMS
[Patient Intake Form Reviewed] : Patient intake form was reviewed [Fatigue] : fatigue [Joint Pain] : joint pain [Constipation] : constipation [Joint Stiffness] : joint stiffness [Muscle Pain] : muscle pain [Negative] : Allergic/Immunologic [Fever] : no fever [Chills] : no chills [Recent Change In Weight] : ~T no recent weight change [Odynophagia] : no odynophagia [Mucosal Pain] : no mucosal pain [Chest Pain] : no chest pain [Shortness Of Breath] : no shortness of breath [Abdominal Pain] : no abdominal pain [Skin Rash] : no skin rash [Easy Bleeding] : no tendency for easy bleeding [Easy Bruising] : no tendency for easy bruising [Swollen Glands] : no swollen glands [FreeTextEntry2] : appetite is better she uses CBD gummies to help [FreeTextEntry4] : History of hearing deficits [FreeTextEntry9] : myalgia is better. unchanged knee pain. [FreeTextEntry7] :  c/o occ loose stools, controlled by imodium when needed [de-identified] : LE neuropathy,  improved neuropathy, now able to walk around better

## 2024-04-06 NOTE — REASON FOR VISIT
[Follow-Up Visit] : a follow-up [Pre-Treatment Visit] : a pre-treatment [FreeTextEntry2] : primary peritoneal cancer currently on Marce and Olaparib maintenance started 9/2022

## 2024-04-06 NOTE — ASSESSMENT
[Curative] : Goals of care discussed with patient: Curative [FreeTextEntry1] : 68 y.o. woman with stage IIIC Primary peritoneal carcinoma with no evidence of disease on exam - HRD positive with RAD51c mutation - Because of HRD status, the patient was started on olaparib, initially at dose reduction. We attempted increasing the dose to 300 mg BID, but the patient developed mucositis and decreased appetite with significant weight loss so dose was reduced back to 200mg BID. Patient tolerating this dose well, side effects stable. - PLTs remain WNL, low normal - She finished final dose of Marce maintenance(C27) on 3/15/24 - Remains on Olaparib (20th month in March, plan for 2 yrs total) - Labs are reviewed and acceptable for therapy - Will schedule monthly follow up and cont with olaparib  # Neuropathy is mild and persists but it does not affect her life. She is receiving PT and that appears to help her arthralgias  # proteinuria has been mild and likely related to marce.   - she is now off Marce   # appetite is fair, she uses gummies her albumin is normal but total protein is a little low.

## 2024-04-06 NOTE — ADDENDUM
[FreeTextEntry1] : Documented by Brendan Ayala acting as scribe for Dr. Iraheta on  04/02/2024.   All Medical record entries made by the Scribe were at my, Dr. Iraheta's, direction and personally dictated by me on  04/02/2024. I have reviewed the chart and agree that the record accurately reflects my personal performance of the history, physical exam, assessment and plan. I have also personally directed, reviewed, and agreed with the discharge instructions.

## 2024-04-06 NOTE — HISTORY OF PRESENT ILLNESS
[Disease: _____________________] : Disease: [unfilled] [Cardiovascular] : Cardiovascular [ENT] : ENT [Endocrine] : Endocrine [Dermatologic] : Dermatologic [Genitourinary] : Genitourinary [Gynecologic] : Gynecologic [Infectious] : Infectious [Pulmonary] : Pulmonary [de-identified] : The patient is a 68 yo F with a RAD51c gene mutation s/p prophylactic BSO in 2018 who was diagnosed with high-grade serous carcinoma found in a right side omental nodule in February 2022. She underwent 3 cylces of Carbo/Taxol/Marce followed by interval debulking including a supracervical hysterectomy and omentectomy. Surgical findings did not reveal any gross disease but the uterus was stuck to the rectum.  was initially elevated at 211 on 1/18/22 Tumor demonstrated CK-7, PAX 8, P16, P53, WT1: Positive.  CK-20: Negative  The patient was found to be HRD positive by  testing. She completed an additional 3 cycles of chemotherapy with Marce on 8/4/22 and was started on bevacizumab maintenance on 9/16/22. Olaparib was added given her HRD positive status. She completed 27 cycles of Marce on 3/15/24.  PMH: h/o HTN, hyperlipidemia, GERD, decreased hearing (present since childhood), depression [de-identified] : high grade serous [de-identified] : ca125 211 [de-identified] : genomic instability score is positive (78) [de-identified] : Patient presents for follow up after she completed her final cycle (C27) of maintenance Marce therapy on 3/15/24 and continues on Olaparib maintenance for primary peritoneal carcinoma. She is taking 200mg of Olaparib BID after she required a dose reduction for mucositis and decreased appetite. She is tolerating her current regimen of treatment well and feels well overall. She complains of continued pain in bilateral knees that she relates to neuropathy, although I am not sure about that. Pain is improving after attending physical therapy. Denies worsening neuropathy. Outside of physical therapy, she has begun to garden to remain active. Denies myalgias. She is moving her bowels well but uses Imodium prn. Denies black/bloody stools. Denies vaginal bleeding. She uses medical cannabis gummies prn for appetite stimulation.  [de-identified] : Arthralgia [de-identified] : fatigue [de-identified] : Neuropathy with pain in LE [de-identified] : Musculoskeletal: [TWNoteComboBox3] : Grade: 2 [de-identified] : Constitutional: [de-identified] : Grade: 1 [de-identified] : Neurologic: [de-identified] : Grade: 2

## 2024-04-26 ENCOUNTER — APPOINTMENT (OUTPATIENT)
Dept: PHYSICAL MEDICINE AND REHAB | Facility: CLINIC | Age: 69
End: 2024-04-26

## 2024-05-24 ENCOUNTER — APPOINTMENT (OUTPATIENT)
Dept: PHYSICAL MEDICINE AND REHAB | Facility: CLINIC | Age: 69
End: 2024-05-24
Payer: MEDICARE

## 2024-05-24 DIAGNOSIS — Z74.09 OTHER REDUCED MOBILITY: ICD-10-CM

## 2024-05-24 DIAGNOSIS — M79.2 NEURALGIA AND NEURITIS, UNSPECIFIED: ICD-10-CM

## 2024-05-24 PROCEDURE — 99214 OFFICE O/P EST MOD 30 MIN: CPT

## 2024-05-24 NOTE — PHYSICAL EXAM
[FreeTextEntry1] : Gen: Patient is A&O x 3, NAD HEENT: EOMI, hearing grossly normal Resp: regular, non - labored CV: pulses regular Skin: no rashes, erythema Lymph: no clubbing, cyanosis, edema Inspection: no instability  ROM: full throughout Palpation:  No TTP bilateral knee joints  Sensation: mild decrease in LE to light touch distally   Reflexes: 1+ and symmetric throughout Strength: 5/5 throughout Gait: antalgic, wide gait, difficulty with tandem gait

## 2024-05-24 NOTE — HISTORY OF PRESENT ILLNESS
[FreeTextEntry1] : Ms. Retana is a 68-year-old female with Primary Peritoneal Cancer, high grade serous, stage III, HRD positive.  S/p 3 cycles of neoadjuvant chemotherapy with carbo/taxol/estuardo.  S/p interval debulking surgery on 6/6/2022.  Then s/p additional 3 cycles of carbo/taxol, then started on maintenance bevacizumab. Patient is on olaparib (started on 9/2022).  She was doing PT twice a week but took a break. She finds it helpful. She would like to work on stairs.   Medical marijuana helps her with sleep and neuropathy. Gabapentin 200 mg is helpful, taking twice a day.  She finds it helpful. Denies any side effects. She hasn't tried taking medical marijuana prior to eating.   Denies new overt weakness. Denies loss of control of bowel/bladder.

## 2024-05-24 NOTE — ASSESSMENT
[FreeTextEntry1] : 68-year-old female presenting for evaluation.  #Decreased appetite/Peritoneal carcinomatosis:  -Previously certified for medical marijuana -Continue medical marijuana as needed  #Neuropathy: -Continue gabapentin 200mg BID  #Impaired gait/balance: -Restart PT to work on home exercise plan, strengthening, range of motion, gait and fall prevention/safety -Discussed about dedicated back imaging such as MRI, but she would like to defer for now   Follow up in 6 weeks

## 2024-05-24 NOTE — REVIEW OF SYSTEMS
[Recent Change In Weight] : ~T recent weight change [Difficulty Walking] : difficulty walking [Negative] : Heme/Lymph [FreeTextEntry2] : insomnia. Decreased appetite [de-identified] : neuropathy symptoms

## 2024-05-28 ENCOUNTER — OUTPATIENT (OUTPATIENT)
Dept: OUTPATIENT SERVICES | Facility: HOSPITAL | Age: 69
LOS: 1 days | Discharge: HOME | End: 2024-05-28

## 2024-05-28 ENCOUNTER — RESULT REVIEW (OUTPATIENT)
Age: 69
End: 2024-05-28

## 2024-05-28 ENCOUNTER — APPOINTMENT (OUTPATIENT)
Dept: GYNECOLOGIC ONCOLOGY | Facility: CLINIC | Age: 69
End: 2024-05-28
Payer: MEDICARE

## 2024-05-28 ENCOUNTER — LABORATORY RESULT (OUTPATIENT)
Age: 69
End: 2024-05-28

## 2024-05-28 ENCOUNTER — APPOINTMENT (OUTPATIENT)
Dept: HEMATOLOGY ONCOLOGY | Facility: CLINIC | Age: 69
End: 2024-05-28

## 2024-05-28 VITALS
RESPIRATION RATE: 17 BRPM | BODY MASS INDEX: 23.37 KG/M2 | OXYGEN SATURATION: 97 % | DIASTOLIC BLOOD PRESSURE: 77 MMHG | WEIGHT: 127 LBS | TEMPERATURE: 97.3 F | SYSTOLIC BLOOD PRESSURE: 143 MMHG | HEIGHT: 62 IN | HEART RATE: 54 BPM

## 2024-05-28 DIAGNOSIS — Z90.722 ACQUIRED ABSENCE OF OVARIES, BILATERAL: Chronic | ICD-10-CM

## 2024-05-28 DIAGNOSIS — M25.50 PAIN IN UNSPECIFIED JOINT: ICD-10-CM

## 2024-05-28 DIAGNOSIS — Z98.51 TUBAL LIGATION STATUS: Chronic | ICD-10-CM

## 2024-05-28 DIAGNOSIS — C48.2 MALIGNANT NEOPLASM OF PERITONEUM, UNSPECIFIED: ICD-10-CM

## 2024-05-28 DIAGNOSIS — Z96.21 COCHLEAR IMPLANT STATUS: Chronic | ICD-10-CM

## 2024-05-28 DIAGNOSIS — Z98.890 OTHER SPECIFIED POSTPROCEDURAL STATES: Chronic | ICD-10-CM

## 2024-05-28 LAB
BASOPHILS # BLD AUTO: 0 K/UL — SIGNIFICANT CHANGE UP (ref 0–0.2)
BASOPHILS NFR BLD AUTO: 1.1 % — SIGNIFICANT CHANGE UP (ref 0–2)
EOSINOPHIL # BLD AUTO: 0 K/UL — SIGNIFICANT CHANGE UP (ref 0–0.5)
EOSINOPHIL NFR BLD AUTO: 1.1 % — SIGNIFICANT CHANGE UP (ref 0–6)
HCT VFR BLD CALC: 36.1 % — SIGNIFICANT CHANGE UP (ref 34.5–45)
HGB BLD-MCNC: 12.6 G/DL — SIGNIFICANT CHANGE UP (ref 11.5–15.5)
LYMPHOCYTES # BLD AUTO: 1 K/UL — SIGNIFICANT CHANGE UP (ref 1–3.3)
LYMPHOCYTES # BLD AUTO: 24.7 % — SIGNIFICANT CHANGE UP (ref 13–44)
MCHC RBC-ENTMCNC: 35 G/DL — SIGNIFICANT CHANGE UP (ref 32–36)
MCHC RBC-ENTMCNC: 38.6 PG — HIGH (ref 27–34)
MCV RBC AUTO: 110.1 FL — HIGH (ref 80–100)
MONOCYTES # BLD AUTO: 0.4 K/UL — SIGNIFICANT CHANGE UP (ref 0–0.9)
MONOCYTES NFR BLD AUTO: 9.1 % — SIGNIFICANT CHANGE UP (ref 2–14)
NEUTROPHILS # BLD AUTO: 2.5 K/UL — SIGNIFICANT CHANGE UP (ref 1.8–7.4)
NEUTROPHILS NFR BLD AUTO: 64 % — SIGNIFICANT CHANGE UP (ref 43–77)
PLATELET # BLD AUTO: 155 K/UL — SIGNIFICANT CHANGE UP (ref 150–400)
RBC # BLD: 3.28 M/UL — LOW (ref 3.8–5.2)
RBC # FLD: 13 % — SIGNIFICANT CHANGE UP (ref 10.3–14.5)
WBC # BLD: 3.9 K/UL — SIGNIFICANT CHANGE UP (ref 3.8–10.5)
WBC # FLD AUTO: 3.9 K/UL — SIGNIFICANT CHANGE UP (ref 3.8–10.5)

## 2024-05-28 PROCEDURE — 99214 OFFICE O/P EST MOD 30 MIN: CPT

## 2024-05-28 PROCEDURE — G2211 COMPLEX E/M VISIT ADD ON: CPT

## 2024-05-28 NOTE — HISTORY OF PRESENT ILLNESS
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 She underwent 3 cylces of Carbo/Taxol/Marce followed by interval debulking including a supracervical hysterectomy and omentectomy. Surgical findings did not reveal any gross disease but the uterus was stuck to the rectum.  was initially elevated at 211 on 1/18/22 Tumor demonstrated CK-7, PAX 8, P16, P53, WT1: Positive.  CK-20: Negative  The patient was found to be HRD positive by [employer] testing. She completed an additional 3 cycles of chemotherapy with Marce on 8/4/22 and was started on bevacizumab maintenance on 9/16/22. Olaparib was added given her HRD positive status. She completed 27 cycles of Marce on 3/15/24. And is planning to complete 2 years of olaparib in July of 2024.  PMH: h/o HTN, hyperlipidemia, GERD, decreased hearing (present since childhood), depression [de-identified] : high grade serous [de-identified] : ca125 211 [de-identified] : genomic instability score is positive (78) [de-identified] : Patient presents for follow up after completing her final cycle (C27) of maintenance Marce therapy on 3/15/24 and now continues on Olaparib maintenance for primary peritoneal carcinoma. She is taking 200mg of Olaparib BID after she required a dose reduction for mucositis and decreased appetite. She is tolerating her current regimen of treatment well and feels well overall. She complains of mild nausea which is alleviated with Zofran. She has improving brain fog. Activity wise, she works one day a week, and has resumed gardening again. She is moving her bowels regularly without GI changes and voiding without complications. Denies black/bloody stools, vaginal bleeding and vaginal discharge. She continues to use medical cannabis drops prn for appetite stimulation because of some weight loss associated with decreased appetite as she admits that she does not eat as frequent as she should. Neuropathy is stable. [de-identified] : Arthralgia [de-identified] : fatigue [de-identified] : Neuropathy with pain in LE [de-identified] : Musculoskeletal: [TWNoteComboBox3] : Grade: 2 [de-identified] : Constitutional: [de-identified] : Grade: 1 [de-identified] : Neurologic: [de-identified] : Grade: 2

## 2024-05-28 NOTE — ADDENDUM
[FreeTextEntry1] : Documented by Brendan Ayala acting as scribe for Dr. Iraheta on  05/28/2024.   All Medical record entries made by the Scribe were at my, Dr. Iraheta's, direction and personally dictated by me on  05/28/2024. I have reviewed the chart and agree that the record accurately reflects my personal performance of the history, physical exam, assessment and plan. I have also personally directed, reviewed, and agreed with the discharge instructions.

## 2024-05-28 NOTE — H&P PST ADULT - NSICDXFAMILYHX_GEN_ALL_CORE_FT
González Billy (:  2020) is a 3 y.o. male,Established patient, here for evaluation of the following chief complaint(s):  Eye Pain (Right eye redness. Carrie harding w/pt)      Assessment & Plan    Diagnosis Orders   1. Impetigo          Augmentin and mupirocin for the treatment of impetigo.   Discussed with grandmother that I am most suspicious of bullous impetigo however these could be herpetic in nature. She denies any known contact with anyone with cold sores etc. If not improving I would like to start antivirals so  have mom send a photo tomorrow.   Dosage, administration, and potential side effects of all medications reviewed.   Return to clinic if failure to improve, emergence of new symptoms, or further concerns.      No follow-ups on file.       Subjective   Eye Pain     González presents to clinic with concern for a rash under his right eye. Grandmother reports that she was informed of the area this morning. He had a bad cellulitis earlier this year and family  is concerned that it is returning. No fevers or other symptoms noted. Grandmother denies contact with anyone with a fever blister that she is aware of.     Review of Systems   Eyes:  Positive for pain.   All other systems reviewed and are negative.      Objective   Physical Exam  Vitals reviewed.   Constitutional:       General: He is not in acute distress.     Appearance: He is well-developed.   HENT:      Right Ear: External ear normal.      Left Ear: External ear normal.      Nose: Nose normal.      Mouth/Throat:      Mouth: Mucous membranes are moist.      Pharynx: Oropharynx is clear.   Eyes:      General:         Right eye: No discharge.         Left eye: No discharge.      Conjunctiva/sclera: Conjunctivae normal.      Comments: Yellow blisters under his right eye with surrounding erythema.    Cardiovascular:      Rate and Rhythm: Normal rate and regular rhythm.      Heart sounds: No murmur heard.  Pulmonary:      Effort: Pulmonary 
FAMILY HISTORY:  Father  Still living? No  FH: throat cancer, Age at diagnosis: Age Unknown    Mother  Still living? No  FH: ovarian cancer, Age at diagnosis: Age Unknown    Grandparent  Still living? No  FH: breast cancer, Age at diagnosis: Age Unknown    Aunt  Still living? No  FH: pancreatic cancer, Age at diagnosis: Age Unknown

## 2024-05-28 NOTE — ASSESSMENT
[Curative] : Goals of care discussed with patient: Curative [FreeTextEntry1] : 68 y.o. woman with stage IIIC Primary peritoneal carcinoma with no evidence of disease on exam - HRD positive with RAD51c mutation - Because of HRD status, the patient was started on olaparib, initially at dose reduction. We attempted increasing the dose to 300 mg BID, but the patient developed mucositis and decreased appetite with significant weight loss so dose was reduced back to 200mg BID. Patient tolerating this dose fairly well, side effects stable. - PLTs remain WNL, low normal - Remains on Olaparib (22nd month in May, plan for 2 yrs total (Complete in July) - F/u with NP in one month and with Dr. Iraheta in two months.   # Neuropathy is mild and persists but it does not affect her life. She is receiving PT and that appears to help her arthralgias  # appetite is fair, she is using THC drops but she has not really seen a benefit. I suspect this is from her olaparib.  #weight loss: She has lost 6-7 lbs recently and about 16 lbs since June of 2023. I am a little concerned that it could be related to her olaparib. If this does not improve soon, I would consider stopping her olaparib early. For now, lets see how she does with one more month now that she started the THC drops

## 2024-05-28 NOTE — REVIEW OF SYSTEMS
[Patient Intake Form Reviewed] : Patient intake form was reviewed [Fatigue] : fatigue [Constipation] : constipation [Joint Pain] : joint pain [Joint Stiffness] : joint stiffness [Muscle Pain] : muscle pain [Negative] : Allergic/Immunologic [Fever] : no fever [Chills] : no chills [Recent Change In Weight] : ~T no recent weight change [Odynophagia] : no odynophagia [Mucosal Pain] : no mucosal pain [Chest Pain] : no chest pain [Shortness Of Breath] : no shortness of breath [Abdominal Pain] : no abdominal pain [Skin Rash] : no skin rash [Easy Bleeding] : no tendency for easy bleeding [Easy Bruising] : no tendency for easy bruising [Swollen Glands] : no swollen glands [FreeTextEntry2] : appetite is decreased but she just started using CBD drops to help with appetite stimulation  [FreeTextEntry4] : History of hearing deficits [FreeTextEntry7] : no major issues to report [FreeTextEntry9] : myalgia is better. unchanged knee pain. [de-identified] : denies neuropathy today.

## 2024-05-28 NOTE — PHYSICAL EXAM
[Restricted in physically strenuous activity but ambulatory and able to carry out work of a light or sedentary nature] : Status 1- Restricted in physically strenuous activity but ambulatory and able to carry out work of a light or sedentary nature, e.g., light house work, office work [Thin] : thin [Normal] : affect appropriate [Ulcers] : no ulcers [Mucositis] : no mucositis [de-identified] : healthy appearing [de-identified] : Patient has bilateral hearing loss. No mucosal lesions [de-identified] : no ascites [de-identified] : +2 bilateral upper and +1 lower extremity reflexes: equal and symmetrical,

## 2024-05-30 LAB
ALBUMIN SERPL ELPH-MCNC: 4.1 G/DL
ALP BLD-CCNC: 65 U/L
ALT SERPL-CCNC: 19 U/L
ANION GAP SERPL CALC-SCNC: 10 MMOL/L
APPEARANCE: CLEAR
AST SERPL-CCNC: 24 U/L
BILIRUB SERPL-MCNC: 0.7 MG/DL
BILIRUBIN URINE: NEGATIVE
BLOOD URINE: NEGATIVE
BUN SERPL-MCNC: 17 MG/DL
CALCIUM SERPL-MCNC: 9.2 MG/DL
CANCER AG125 SERPL-ACNC: 7 U/ML
CHLORIDE SERPL-SCNC: 102 MMOL/L
CO2 SERPL-SCNC: 27 MMOL/L
COLOR: YELLOW
CREAT SERPL-MCNC: 0.77 MG/DL
CREAT SPEC-SCNC: 64 MG/DL
CREAT/PROT UR: 0.6 RATIO
EGFR: 84 ML/MIN/1.73M2
GLUCOSE QUALITATIVE U: NEGATIVE MG/DL
GLUCOSE SERPL-MCNC: 99 MG/DL
KETONES URINE: NEGATIVE MG/DL
LEUKOCYTE ESTERASE URINE: NEGATIVE
MAGNESIUM SERPL-MCNC: 2 MG/DL
NITRITE URINE: NEGATIVE
PH URINE: 8
POTASSIUM SERPL-SCNC: 4.5 MMOL/L
PROT SERPL-MCNC: 6 G/DL
PROT UR-MCNC: 39 MG/DL
PROTEIN URINE: 30 MG/DL
SODIUM SERPL-SCNC: 139 MMOL/L
SPECIFIC GRAVITY URINE: 1.02
UROBILINOGEN URINE: 1 MG/DL

## 2024-06-26 ENCOUNTER — APPOINTMENT (OUTPATIENT)
Dept: HEMATOLOGY ONCOLOGY | Facility: CLINIC | Age: 69
End: 2024-06-26
Payer: MEDICARE

## 2024-06-26 ENCOUNTER — RESULT REVIEW (OUTPATIENT)
Age: 69
End: 2024-06-26

## 2024-06-26 VITALS
BODY MASS INDEX: 23.56 KG/M2 | HEIGHT: 62 IN | HEART RATE: 63 BPM | DIASTOLIC BLOOD PRESSURE: 73 MMHG | SYSTOLIC BLOOD PRESSURE: 127 MMHG | OXYGEN SATURATION: 97 % | WEIGHT: 128.03 LBS

## 2024-06-26 DIAGNOSIS — C48.2 MALIGNANT NEOPLASM OF PERITONEUM, UNSPECIFIED: ICD-10-CM

## 2024-06-26 DIAGNOSIS — R63.4 ABNORMAL WEIGHT LOSS: ICD-10-CM

## 2024-06-26 DIAGNOSIS — G62.9 POLYNEUROPATHY, UNSPECIFIED: ICD-10-CM

## 2024-06-26 DIAGNOSIS — Z29.89 ENCOUNTER. FOR OTHER SPECIFIED PROPHYLACTIC MEASURES: ICD-10-CM

## 2024-06-26 DIAGNOSIS — R63.0 ANOREXIA: ICD-10-CM

## 2024-06-26 LAB
BASOPHILS # BLD AUTO: 0 K/UL — SIGNIFICANT CHANGE UP (ref 0–0.2)
BASOPHILS NFR BLD AUTO: 0.9 % — SIGNIFICANT CHANGE UP (ref 0–2)
EOSINOPHIL # BLD AUTO: 0.1 K/UL — SIGNIFICANT CHANGE UP (ref 0–0.5)
EOSINOPHIL NFR BLD AUTO: 1.1 % — SIGNIFICANT CHANGE UP (ref 0–6)
HCT VFR BLD CALC: 36.2 % — SIGNIFICANT CHANGE UP (ref 34.5–45)
HGB BLD-MCNC: 12.6 G/DL — SIGNIFICANT CHANGE UP (ref 11.5–15.5)
LYMPHOCYTES # BLD AUTO: 0.9 K/UL — LOW (ref 1–3.3)
LYMPHOCYTES # BLD AUTO: 19.2 % — SIGNIFICANT CHANGE UP (ref 13–44)
MCHC RBC-ENTMCNC: 34.9 G/DL — SIGNIFICANT CHANGE UP (ref 32–36)
MCHC RBC-ENTMCNC: 38.1 PG — HIGH (ref 27–34)
MCV RBC AUTO: 109.2 FL — HIGH (ref 80–100)
MONOCYTES # BLD AUTO: 0.5 K/UL — SIGNIFICANT CHANGE UP (ref 0–0.9)
MONOCYTES NFR BLD AUTO: 9.4 % — SIGNIFICANT CHANGE UP (ref 2–14)
NEUTROPHILS # BLD AUTO: 3.4 K/UL — SIGNIFICANT CHANGE UP (ref 1.8–7.4)
NEUTROPHILS NFR BLD AUTO: 69.4 % — SIGNIFICANT CHANGE UP (ref 43–77)
PLATELET # BLD AUTO: 154 K/UL — SIGNIFICANT CHANGE UP (ref 150–400)
RBC # BLD: 3.31 M/UL — LOW (ref 3.8–5.2)
RBC # FLD: 12.8 % — SIGNIFICANT CHANGE UP (ref 10.3–14.5)
WBC # BLD: 4.8 K/UL — SIGNIFICANT CHANGE UP (ref 3.8–10.5)
WBC # FLD AUTO: 4.8 K/UL — SIGNIFICANT CHANGE UP (ref 3.8–10.5)

## 2024-06-26 PROCEDURE — 99214 OFFICE O/P EST MOD 30 MIN: CPT

## 2024-06-26 PROCEDURE — G2211 COMPLEX E/M VISIT ADD ON: CPT

## 2024-06-27 LAB
ALBUMIN SERPL ELPH-MCNC: 3.9 G/DL
ALP BLD-CCNC: 69 U/L
ALT SERPL-CCNC: 23 U/L
ANION GAP SERPL CALC-SCNC: 10 MMOL/L
AST SERPL-CCNC: 19 U/L
BILIRUB SERPL-MCNC: 0.4 MG/DL
BUN SERPL-MCNC: 19 MG/DL
CALCIUM SERPL-MCNC: 9.1 MG/DL
CANCER AG125 SERPL-ACNC: 7 U/ML
CHLORIDE SERPL-SCNC: 104 MMOL/L
CO2 SERPL-SCNC: 27 MMOL/L
CREAT SERPL-MCNC: 0.75 MG/DL
EGFR: 87 ML/MIN/1.73M2
GLUCOSE SERPL-MCNC: 117 MG/DL
POTASSIUM SERPL-SCNC: 4.6 MMOL/L
PROT SERPL-MCNC: 5.8 G/DL
SODIUM SERPL-SCNC: 142 MMOL/L

## 2024-07-18 ENCOUNTER — APPOINTMENT (OUTPATIENT)
Dept: GYNECOLOGIC ONCOLOGY | Facility: CLINIC | Age: 69
End: 2024-07-18

## 2024-07-18 VITALS
DIASTOLIC BLOOD PRESSURE: 72 MMHG | HEIGHT: 62 IN | OXYGEN SATURATION: 97 % | BODY MASS INDEX: 23.55 KG/M2 | SYSTOLIC BLOOD PRESSURE: 131 MMHG | HEART RATE: 61 BPM | WEIGHT: 128 LBS | RESPIRATION RATE: 16 BRPM

## 2024-07-18 DIAGNOSIS — L90.0 LICHEN SCLEROSUS ET ATROPHICUS: ICD-10-CM

## 2024-07-18 PROCEDURE — 99459 PELVIC EXAMINATION: CPT

## 2024-07-18 PROCEDURE — G2211 COMPLEX E/M VISIT ADD ON: CPT

## 2024-07-18 PROCEDURE — 99214 OFFICE O/P EST MOD 30 MIN: CPT

## 2024-07-29 ENCOUNTER — OUTPATIENT (OUTPATIENT)
Dept: OUTPATIENT SERVICES | Facility: HOSPITAL | Age: 69
LOS: 1 days | Discharge: HOME | End: 2024-07-29

## 2024-07-29 DIAGNOSIS — Z98.51 TUBAL LIGATION STATUS: Chronic | ICD-10-CM

## 2024-07-29 DIAGNOSIS — Z96.21 COCHLEAR IMPLANT STATUS: Chronic | ICD-10-CM

## 2024-07-29 DIAGNOSIS — C48.2 MALIGNANT NEOPLASM OF PERITONEUM, UNSPECIFIED: ICD-10-CM

## 2024-07-29 DIAGNOSIS — Z98.890 OTHER SPECIFIED POSTPROCEDURAL STATES: Chronic | ICD-10-CM

## 2024-07-29 DIAGNOSIS — Z90.722 ACQUIRED ABSENCE OF OVARIES, BILATERAL: Chronic | ICD-10-CM

## 2024-07-30 ENCOUNTER — APPOINTMENT (OUTPATIENT)
Dept: HEMATOLOGY ONCOLOGY | Facility: CLINIC | Age: 69
End: 2024-07-30

## 2024-07-30 ENCOUNTER — APPOINTMENT (OUTPATIENT)
Dept: GYNECOLOGIC ONCOLOGY | Facility: CLINIC | Age: 69
End: 2024-07-30
Payer: MEDICARE

## 2024-07-30 VITALS
SYSTOLIC BLOOD PRESSURE: 146 MMHG | TEMPERATURE: 98.1 F | WEIGHT: 126.03 LBS | HEART RATE: 71 BPM | OXYGEN SATURATION: 94 % | DIASTOLIC BLOOD PRESSURE: 75 MMHG | BODY MASS INDEX: 23.19 KG/M2 | HEIGHT: 62 IN

## 2024-07-30 DIAGNOSIS — R63.0 ANOREXIA: ICD-10-CM

## 2024-07-30 DIAGNOSIS — C48.2 MALIGNANT NEOPLASM OF PERITONEUM, UNSPECIFIED: ICD-10-CM

## 2024-07-30 PROCEDURE — G2211 COMPLEX E/M VISIT ADD ON: CPT

## 2024-07-30 PROCEDURE — 99214 OFFICE O/P EST MOD 30 MIN: CPT

## 2024-07-30 NOTE — ADDENDUM
[FreeTextEntry1] : Documented by Brendan Ayala acting as scribe for Dr. Iraheta on  07/30/2024.   All Medical record entries made by the Scribe were at my, Dr. Iraheta's, direction and personally dictated by me on  07/30/2024. I have reviewed the chart and agree that the record accurately reflects my personal performance of the history, physical exam, assessment and plan. I have also personally directed, reviewed, and agreed with the discharge instructions.

## 2024-07-30 NOTE — RESULTS/DATA
[FreeTextEntry1] : 9/22/23: CT Abdomen/pelvis/chest IMPRESSION: No CT evidence of local tumor recurrence or metastatic disease in the chest, abdomen and pelvis Blood work from today is pending.

## 2024-07-30 NOTE — ASSESSMENT
[Curative] : Goals of care discussed with patient: Curative [FreeTextEntry1] : 68 y.o. woman with stage IIIC Primary peritoneal carcinoma with no evidence of disease on exam - HRD positive with RAD51c mutation - Because of HRD status, the patient was started on olaparib, initially at dose reduction. We attempted increasing the dose to 300 mg BID, but the patient developed mucositis and decreased appetite with significant weight loss so dose was reduced back to 200mg BID. She completed her almost 2 years of maintenance earlier this month. She feels great off of therapy - Will repeat labs today - Completed on Olaparib in mid July. (23rd month in June, plan for 2 yrs total (Complete in July) - CT A/P in January 2025.  - F/u with Dr Iraheta in three months.   # Decreased appetite  - Appetite is improving after completing Olaparib  - She is using THC drops but she has not really seen a benefit. I suspect this is from her olaparib.   #Nausea - Continue zofran PRN - this has ultimately resolved after stopping her olaparib.   #weight loss - weight is up 3 pounds today - Continue to monitor  Plan: check her blood work today. Follow up with me in 3 months. Repeat CT C/A/P in January. I gave her my card so that her daughter with RAD51c mutation can contact me.

## 2024-07-30 NOTE — REVIEW OF SYSTEMS
[Patient Intake Form Reviewed] : Patient intake form was reviewed [Constipation] : constipation [Joint Stiffness] : joint stiffness [Muscle Pain] : muscle pain [Negative] : Allergic/Immunologic [Fever] : no fever [Chills] : no chills [Recent Change In Weight] : ~T no recent weight change [Odynophagia] : no odynophagia [Mucosal Pain] : no mucosal pain [Chest Pain] : no chest pain [Shortness Of Breath] : no shortness of breath [Abdominal Pain] : no abdominal pain [Vomiting] : no vomiting [Skin Rash] : no skin rash [Easy Bleeding] : no tendency for easy bleeding [Easy Bruising] : no tendency for easy bruising [Swollen Glands] : no swollen glands [FreeTextEntry2] : appetite is improving with increased activity.  [FreeTextEntry4] : History of hearing deficits [FreeTextEntry7] : no further N or V [FreeTextEntry9] : unchanged residual knee pain. [de-identified] : denies neuropathy today.

## 2024-07-30 NOTE — HISTORY OF PRESENT ILLNESS
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 She underwent 3 cylces of Carbo/Taxol/Marce followed by interval debulking including a supracervical hysterectomy and omentectomy. Surgical findings did not reveal any gross disease but the uterus was stuck to the rectum.  was initially elevated at 211 on 1/18/22 Tumor demonstrated CK-7, PAX 8, P16, P53, WT1: Positive.  CK-20: Negative  The patient was found to be HRD positive by [employer] testing. She completed an additional 3 cycles of chemotherapy with Marce on 8/4/22 and was started on bevacizumab maintenance on 9/16/22. Olaparib was added given her HRD positive status. She completed 27 cycles of Marce on 3/15/24. And completed almost 2 years of olaparib in July of 2024.  PMH: h/o HTN, hyperlipidemia, GERD, decreased hearing (present since childhood), depression [de-identified] : high grade serous [de-identified] : ca125 211 [de-identified] : genomic instability score is positive (78) [de-identified] : Patient presents for follow up after completing 27 cycles of Marce and 2 years of Olaparib maintenance for primary peritoneal carcinoma. She finished her Olaparib 2 weeks ago. Since completing her medication, she has been more active and feeling well overall. Appetite has improved significantly with some weight gain. Denies neuropathy in her hands and feet. Chemo fog is slowly improving. Patient reports diarrhea after catching a stomach bug, fully recovered today without symptoms. Due to see Dr. Murillo in January 2025. She looks and feels well in the office. Of note is that she has a RAD51c mutation. [de-identified] : Arthralgia [de-identified] : fatigue [de-identified] : Neuropathy with pain in LE [de-identified] : Musculoskeletal: [TWNoteComboBox3] : Grade: 2 [de-identified] : Constitutional: [de-identified] : Grade: 1 [de-identified] : Neurologic: [de-identified] : Grade: 2

## 2024-07-30 NOTE — PHYSICAL EXAM
[Restricted in physically strenuous activity but ambulatory and able to carry out work of a light or sedentary nature] : Status 1- Restricted in physically strenuous activity but ambulatory and able to carry out work of a light or sedentary nature, e.g., light house work, office work [Thin] : thin [Normal] : affect appropriate [Ulcers] : no ulcers [Mucositis] : no mucositis [de-identified] : healthy appearing [de-identified] : Patient has bilateral hearing loss. No mucosal lesions [de-identified] : no ascites [de-identified] : +2 bilateral upper and +2 lower extremity reflexes: equal and symmetrical,

## 2024-08-09 ENCOUNTER — APPOINTMENT (OUTPATIENT)
Dept: PHYSICAL MEDICINE AND REHAB | Facility: CLINIC | Age: 69
End: 2024-08-09

## 2024-08-15 NOTE — PRE-OP CHECKLIST - NS PREOP CHK MONITOR ANESTHESIA CONSENT
BP high.  On medication.   Getting dialysis.    
Monitor: The problem is stable.  Evaluation: Labs/tests ordered, see encounter summary.  Assessment/Treatment:  Continue current treatment/monitoring regimen.  
Monitor: The problem is stable.  Evaluation: Labs/tests ordered, see encounter summary.  Reviewed recent labs/tests with the patient.  Assessment/Treatment:  Continue current treatment/monitoring regimen.  
Monitor: The problem is stable.  Evaluation: No labs/tests required today.  Assessment/Treatment:  Managed by specialist.  
Pt has been stable..  CPM   
to be done/done

## 2024-09-25 ENCOUNTER — APPOINTMENT (OUTPATIENT)
Dept: ORTHOPEDIC SURGERY | Facility: CLINIC | Age: 69
End: 2024-09-25
Payer: MEDICARE

## 2024-09-25 VITALS — BODY MASS INDEX: 22.32 KG/M2 | WEIGHT: 126 LBS | HEIGHT: 63 IN

## 2024-09-25 VITALS — HEIGHT: 63 IN | BODY MASS INDEX: 22.32 KG/M2 | WEIGHT: 126 LBS

## 2024-09-25 DIAGNOSIS — S46.912A STRAIN OF UNSPECIFIED MUSCLE, FASCIA AND TENDON AT SHOULDER AND UPPER ARM LEVEL, LEFT ARM, INITIAL ENCOUNTER: ICD-10-CM

## 2024-09-25 PROCEDURE — 73030 X-RAY EXAM OF SHOULDER: CPT | Mod: LT

## 2024-09-25 PROCEDURE — 99204 OFFICE O/P NEW MOD 45 MIN: CPT

## 2024-09-25 NOTE — PHYSICAL EXAM
[Left] : left shoulder [2 ___] : forward flexion 2[unfilled]/5 [2___] : abduction 2[unfilled]/5 [3___] : internal rotation 3[unfilled]/5 [] : motor and sensory intact distally [TWNoteComboBox6] : internal rotation lateral hip [de-identified] : external rotation 0 degrees [TWNoteComboBox7] : False

## 2024-09-25 NOTE — HISTORY OF PRESENT ILLNESS
[6] : 6 [1] : 2 [Sleep] : sleep [de-identified] : 9/25/24:  left shoulder pain after fall in [place] on 9/23/24.  [] : no [FreeTextEntry1] : Left shoulder [FreeTextEntry4] : within the week [FreeTextEntry5] : New patient here for left shoulder pain. 2 weeks ago Patient fell on a ramp leaving the store and used her arms to break her fall and hurt her left shoulder. Then 2 days ago later patient fell again hitting the left shoulder. Patient is taking over the counter medicine to help with pain. Patient is R5T hand dominant.

## 2024-09-25 NOTE — ASSESSMENT
[FreeTextEntry1] : left shoulder pain after fall in [place] on 9/23/24. NO acute fracture seen.  Some mild Oa.  likely acute on chronic tear with some superior subluxation. old injury about 20 years ago.  Cochlear implant.  Hai Marroquin [place]. Works in [employer].

## 2024-09-25 NOTE — DISCUSSION/SUMMARY
[de-identified] : HEP and PT.  The patient's orthopaedic condition(s) would warrant consideration of consistent or intermittent use of a prescription strength non-steroidal anti-inflammatory medication.  These medications are associated with risks including but not limited to gastrointestinal irritation, kidney damage, hypertension, and bleeding.    The patient has one or more medical conditions that would make this class of medication a significant risk and was therefore not prescribed.  Follow up 4 - 6 weeks.  Will consider injection versus CT arthrogram pending response to PT.

## 2024-10-01 NOTE — RESULTS/DATA
[place]arin [FreeTextEntry1] : Labs 11/24/23:  stable at 7 Mg 1.7 CBC and CMP are normal U/A 100mg/dl protein  9/22/23: CT Abdomen/pelvis/chest IMPRESSION: No CT evidence of local tumor recurrence or metastatic disease in the chest, abdomen and pelvis

## 2024-10-13 ENCOUNTER — OUTPATIENT (OUTPATIENT)
Dept: OUTPATIENT SERVICES | Facility: HOSPITAL | Age: 69
LOS: 1 days | Discharge: HOME | End: 2024-10-13

## 2024-10-13 DIAGNOSIS — Z96.21 COCHLEAR IMPLANT STATUS: Chronic | ICD-10-CM

## 2024-10-13 DIAGNOSIS — Z98.51 TUBAL LIGATION STATUS: Chronic | ICD-10-CM

## 2024-10-13 DIAGNOSIS — Z90.722 ACQUIRED ABSENCE OF OVARIES, BILATERAL: Chronic | ICD-10-CM

## 2024-10-13 DIAGNOSIS — C48.2 MALIGNANT NEOPLASM OF PERITONEUM, UNSPECIFIED: ICD-10-CM

## 2024-10-13 DIAGNOSIS — Z98.890 OTHER SPECIFIED POSTPROCEDURAL STATES: Chronic | ICD-10-CM

## 2024-10-15 ENCOUNTER — RESULT REVIEW (OUTPATIENT)
Age: 69
End: 2024-10-15

## 2024-10-15 ENCOUNTER — APPOINTMENT (OUTPATIENT)
Dept: GYNECOLOGIC ONCOLOGY | Facility: CLINIC | Age: 69
End: 2024-10-15
Payer: MEDICARE

## 2024-10-15 ENCOUNTER — APPOINTMENT (OUTPATIENT)
Dept: HEMATOLOGY ONCOLOGY | Facility: CLINIC | Age: 69
End: 2024-10-15

## 2024-10-15 VITALS
TEMPERATURE: 97.6 F | WEIGHT: 127.65 LBS | DIASTOLIC BLOOD PRESSURE: 75 MMHG | HEIGHT: 63 IN | BODY MASS INDEX: 22.62 KG/M2 | OXYGEN SATURATION: 97 % | SYSTOLIC BLOOD PRESSURE: 148 MMHG | HEART RATE: 58 BPM

## 2024-10-15 DIAGNOSIS — M25.50 PAIN IN UNSPECIFIED JOINT: ICD-10-CM

## 2024-10-15 DIAGNOSIS — C48.2 MALIGNANT NEOPLASM OF PERITONEUM, UNSPECIFIED: ICD-10-CM

## 2024-10-15 DIAGNOSIS — K59.00 CONSTIPATION, UNSPECIFIED: ICD-10-CM

## 2024-10-15 LAB
BASOPHILS # BLD AUTO: 0 K/UL — SIGNIFICANT CHANGE UP (ref 0–0.2)
BASOPHILS NFR BLD AUTO: 1 % — SIGNIFICANT CHANGE UP (ref 0–2)
EOSINOPHIL # BLD AUTO: 0.1 K/UL — SIGNIFICANT CHANGE UP (ref 0–0.5)
EOSINOPHIL NFR BLD AUTO: 2.9 % — SIGNIFICANT CHANGE UP (ref 0–6)
HCT VFR BLD CALC: 41.2 % — SIGNIFICANT CHANGE UP (ref 34.5–45)
HGB BLD-MCNC: 13.6 G/DL — SIGNIFICANT CHANGE UP (ref 11.5–15.5)
LYMPHOCYTES # BLD AUTO: 0.9 K/UL — LOW (ref 1–3.3)
LYMPHOCYTES # BLD AUTO: 24.2 % — SIGNIFICANT CHANGE UP (ref 13–44)
MCHC RBC-ENTMCNC: 33 G/DL — SIGNIFICANT CHANGE UP (ref 32–36)
MCHC RBC-ENTMCNC: 33.8 PG — SIGNIFICANT CHANGE UP (ref 27–34)
MCV RBC AUTO: 102.4 FL — HIGH (ref 80–100)
MONOCYTES # BLD AUTO: 0.4 K/UL — SIGNIFICANT CHANGE UP (ref 0–0.9)
MONOCYTES NFR BLD AUTO: 11.6 % — SIGNIFICANT CHANGE UP (ref 2–14)
NEUTROPHILS # BLD AUTO: 2.2 K/UL — SIGNIFICANT CHANGE UP (ref 1.8–7.4)
NEUTROPHILS NFR BLD AUTO: 60.3 % — SIGNIFICANT CHANGE UP (ref 43–77)
PLATELET # BLD AUTO: 124 K/UL — LOW (ref 150–400)
RBC # BLD: 4.02 M/UL — SIGNIFICANT CHANGE UP (ref 3.8–5.2)
RBC # FLD: 11.5 % — SIGNIFICANT CHANGE UP (ref 10.3–14.5)
WBC # BLD: 3.6 K/UL — LOW (ref 3.8–10.5)
WBC # FLD AUTO: 3.6 K/UL — LOW (ref 3.8–10.5)

## 2024-10-15 PROCEDURE — 99213 OFFICE O/P EST LOW 20 MIN: CPT

## 2024-10-15 PROCEDURE — G2211 COMPLEX E/M VISIT ADD ON: CPT

## 2024-10-17 LAB
ALBUMIN SERPL ELPH-MCNC: 4.5 G/DL
ALP BLD-CCNC: 118 U/L
ALT SERPL-CCNC: 23 U/L
ANION GAP SERPL CALC-SCNC: 9 MMOL/L
AST SERPL-CCNC: 23 U/L
BILIRUB SERPL-MCNC: 0.6 MG/DL
BUN SERPL-MCNC: 20 MG/DL
CALCIUM SERPL-MCNC: 9.4 MG/DL
CANCER AG125 SERPL-ACNC: 9 U/ML
CHLORIDE SERPL-SCNC: 104 MMOL/L
CO2 SERPL-SCNC: 28 MMOL/L
CREAT SERPL-MCNC: 0.65 MG/DL
EGFR: 96 ML/MIN/1.73M2
GLUCOSE SERPL-MCNC: 88 MG/DL
MAGNESIUM SERPL-MCNC: 1.8 MG/DL
POTASSIUM SERPL-SCNC: 4.6 MMOL/L
PROT SERPL-MCNC: 6.3 G/DL
SODIUM SERPL-SCNC: 141 MMOL/L

## 2024-10-23 ENCOUNTER — APPOINTMENT (OUTPATIENT)
Dept: ORTHOPEDIC SURGERY | Facility: CLINIC | Age: 69
End: 2024-10-23
Payer: MEDICARE

## 2024-10-23 VITALS — BODY MASS INDEX: 22.5 KG/M2 | HEIGHT: 63 IN | WEIGHT: 127 LBS

## 2024-10-23 DIAGNOSIS — S46.912D STRAIN OF UNSPECIFIED MUSCLE, FASCIA AND TENDON AT SHOULDER AND UPPER ARM LEVEL, LEFT ARM, SUBSEQUENT ENCOUNTER: ICD-10-CM

## 2024-10-23 PROCEDURE — 99213 OFFICE O/P EST LOW 20 MIN: CPT

## 2024-10-31 ENCOUNTER — NON-APPOINTMENT (OUTPATIENT)
Age: 69
End: 2024-10-31

## 2024-10-31 ENCOUNTER — APPOINTMENT (OUTPATIENT)
Dept: FAMILY MEDICINE | Facility: CLINIC | Age: 69
End: 2024-10-31

## 2024-10-31 VITALS
DIASTOLIC BLOOD PRESSURE: 66 MMHG | OXYGEN SATURATION: 97 % | HEART RATE: 77 BPM | HEIGHT: 63 IN | WEIGHT: 127 LBS | BODY MASS INDEX: 22.5 KG/M2 | SYSTOLIC BLOOD PRESSURE: 110 MMHG

## 2024-10-31 DIAGNOSIS — I10 ESSENTIAL (PRIMARY) HYPERTENSION: ICD-10-CM

## 2024-10-31 DIAGNOSIS — Z12.11 ENCOUNTER FOR SCREENING FOR MALIGNANT NEOPLASM OF COLON: ICD-10-CM

## 2024-10-31 DIAGNOSIS — Z00.00 ENCOUNTER FOR GENERAL ADULT MEDICAL EXAMINATION W/OUT ABNORMAL FINDINGS: ICD-10-CM

## 2024-10-31 DIAGNOSIS — Z87.898 PERSONAL HISTORY OF OTHER SPECIFIED CONDITIONS: ICD-10-CM

## 2024-10-31 DIAGNOSIS — Z92.3 PERSONAL HISTORY OF IRRADIATION: ICD-10-CM

## 2024-10-31 DIAGNOSIS — E78.5 HYPERLIPIDEMIA, UNSPECIFIED: ICD-10-CM

## 2024-10-31 DIAGNOSIS — Z92.21 PERSONAL HISTORY OF IRRADIATION: ICD-10-CM

## 2024-10-31 DIAGNOSIS — Z12.39 ENCOUNTER FOR OTHER SCREENING FOR MALIGNANT NEOPLASM OF BREAST: ICD-10-CM

## 2024-10-31 DIAGNOSIS — K21.9 GASTRO-ESOPHAGEAL REFLUX DISEASE W/OUT ESOPHAGITIS: ICD-10-CM

## 2024-10-31 DIAGNOSIS — Z13.820 ENCOUNTER FOR SCREENING FOR OSTEOPOROSIS: ICD-10-CM

## 2024-10-31 PROCEDURE — 36415 COLL VENOUS BLD VENIPUNCTURE: CPT

## 2024-10-31 PROCEDURE — 99397 PER PM REEVAL EST PAT 65+ YR: CPT

## 2024-11-01 LAB
CHOLEST SERPL-MCNC: 173 MG/DL
ESTIMATED AVERAGE GLUCOSE: 105 MG/DL
FOLATE SERPL-MCNC: 14.7 NG/ML
HBA1C MFR BLD HPLC: 5.3 %
HDLC SERPL-MCNC: 52 MG/DL
LDLC SERPL CALC-MCNC: 106 MG/DL
NONHDLC SERPL-MCNC: 120 MG/DL
TRIGL SERPL-MCNC: 76 MG/DL
TSH SERPL-ACNC: 1.49 UIU/ML
VIT B12 SERPL-MCNC: 559 PG/ML

## 2024-11-15 ENCOUNTER — APPOINTMENT (OUTPATIENT)
Dept: PHYSICAL MEDICINE AND REHAB | Facility: CLINIC | Age: 69
End: 2024-11-15
Payer: MEDICARE

## 2024-11-15 DIAGNOSIS — Z74.09 OTHER REDUCED MOBILITY: ICD-10-CM

## 2024-11-15 DIAGNOSIS — G62.9 POLYNEUROPATHY, UNSPECIFIED: ICD-10-CM

## 2024-11-15 PROCEDURE — 99213 OFFICE O/P EST LOW 20 MIN: CPT

## 2024-12-04 ENCOUNTER — APPOINTMENT (OUTPATIENT)
Dept: ORTHOPEDIC SURGERY | Facility: CLINIC | Age: 69
End: 2024-12-04

## 2024-12-05 ENCOUNTER — TRANSCRIPTION ENCOUNTER (OUTPATIENT)
Age: 69
End: 2024-12-05

## 2024-12-27 ENCOUNTER — APPOINTMENT (OUTPATIENT)
Dept: FAMILY MEDICINE | Facility: CLINIC | Age: 69
End: 2024-12-27
Payer: MEDICARE

## 2024-12-27 VITALS
BODY MASS INDEX: 23.39 KG/M2 | WEIGHT: 132 LBS | OXYGEN SATURATION: 99 % | SYSTOLIC BLOOD PRESSURE: 116 MMHG | HEART RATE: 78 BPM | DIASTOLIC BLOOD PRESSURE: 65 MMHG | HEIGHT: 63 IN

## 2024-12-27 DIAGNOSIS — Z01.818 ENCOUNTER FOR OTHER PREPROCEDURAL EXAMINATION: ICD-10-CM

## 2024-12-27 DIAGNOSIS — I10 ESSENTIAL (PRIMARY) HYPERTENSION: ICD-10-CM

## 2024-12-27 DIAGNOSIS — Z87.19 PERSONAL HISTORY OF OTHER DISEASES OF THE DIGESTIVE SYSTEM: ICD-10-CM

## 2024-12-27 DIAGNOSIS — Z12.11 ENCOUNTER FOR SCREENING FOR MALIGNANT NEOPLASM OF COLON: ICD-10-CM

## 2024-12-27 DIAGNOSIS — R63.0 ANOREXIA: ICD-10-CM

## 2024-12-27 PROCEDURE — 99214 OFFICE O/P EST MOD 30 MIN: CPT

## 2024-12-27 PROCEDURE — 36415 COLL VENOUS BLD VENIPUNCTURE: CPT

## 2024-12-31 ENCOUNTER — APPOINTMENT (OUTPATIENT)
Dept: FAMILY MEDICINE | Facility: CLINIC | Age: 69
End: 2024-12-31

## 2024-12-31 PROBLEM — Z01.818 PREOP TESTING: Status: ACTIVE | Noted: 2022-02-10

## 2024-12-31 LAB
ANION GAP SERPL CALC-SCNC: 12 MMOL/L
BASOPHILS # BLD AUTO: 0.03 K/UL
BASOPHILS NFR BLD AUTO: 0.6 %
BUN SERPL-MCNC: 14 MG/DL
CALCIUM SERPL-MCNC: 9.3 MG/DL
CHLORIDE SERPL-SCNC: 104 MMOL/L
CO2 SERPL-SCNC: 28 MMOL/L
CREAT SERPL-MCNC: 0.64 MG/DL
EGFR: 96 ML/MIN/1.73M2
EOSINOPHIL # BLD AUTO: 0.09 K/UL
EOSINOPHIL NFR BLD AUTO: 1.7 %
GLUCOSE SERPL-MCNC: 112 MG/DL
HCT VFR BLD CALC: 41.7 %
HGB BLD-MCNC: 14.2 G/DL
IMM GRANULOCYTES NFR BLD AUTO: 0.4 %
LYMPHOCYTES # BLD AUTO: 1.33 K/UL
LYMPHOCYTES NFR BLD AUTO: 24.8 %
MAN DIFF?: NORMAL
MCHC RBC-ENTMCNC: 33.3 PG
MCHC RBC-ENTMCNC: 34.1 G/DL
MCV RBC AUTO: 97.9 FL
MONOCYTES # BLD AUTO: 0.4 K/UL
MONOCYTES NFR BLD AUTO: 7.5 %
NEUTROPHILS # BLD AUTO: 3.49 K/UL
NEUTROPHILS NFR BLD AUTO: 65 %
PLATELET # BLD AUTO: 156 K/UL
POTASSIUM SERPL-SCNC: 4.9 MMOL/L
RBC # BLD: 4.26 M/UL
RBC # FLD: 13 %
SODIUM SERPL-SCNC: 144 MMOL/L
WBC # FLD AUTO: 5.36 K/UL

## 2025-01-10 ENCOUNTER — TRANSCRIPTION ENCOUNTER (OUTPATIENT)
Age: 70
End: 2025-01-10

## 2025-01-15 ENCOUNTER — TRANSCRIPTION ENCOUNTER (OUTPATIENT)
Age: 70
End: 2025-01-15

## 2025-01-15 ENCOUNTER — RESULT REVIEW (OUTPATIENT)
Age: 70
End: 2025-01-15

## 2025-01-22 ENCOUNTER — TRANSCRIPTION ENCOUNTER (OUTPATIENT)
Age: 70
End: 2025-01-22

## 2025-01-23 ENCOUNTER — TRANSCRIPTION ENCOUNTER (OUTPATIENT)
Age: 70
End: 2025-01-23

## 2025-01-24 ENCOUNTER — OUTPATIENT (OUTPATIENT)
Dept: OUTPATIENT SERVICES | Facility: HOSPITAL | Age: 70
LOS: 1 days | Discharge: HOME | End: 2025-01-24

## 2025-01-24 DIAGNOSIS — Z98.51 TUBAL LIGATION STATUS: Chronic | ICD-10-CM

## 2025-01-24 DIAGNOSIS — Z90.722 ACQUIRED ABSENCE OF OVARIES, BILATERAL: Chronic | ICD-10-CM

## 2025-01-24 DIAGNOSIS — Z98.890 OTHER SPECIFIED POSTPROCEDURAL STATES: Chronic | ICD-10-CM

## 2025-01-24 DIAGNOSIS — Z96.21 COCHLEAR IMPLANT STATUS: Chronic | ICD-10-CM

## 2025-01-24 DIAGNOSIS — C48.2 MALIGNANT NEOPLASM OF PERITONEUM, UNSPECIFIED: ICD-10-CM

## 2025-01-25 ENCOUNTER — OUTPATIENT (OUTPATIENT)
Dept: OUTPATIENT SERVICES | Facility: HOSPITAL | Age: 70
LOS: 1 days | End: 2025-01-25

## 2025-01-25 ENCOUNTER — APPOINTMENT (OUTPATIENT)
Dept: CT IMAGING | Facility: CLINIC | Age: 70
End: 2025-01-25
Payer: MEDICARE

## 2025-01-25 DIAGNOSIS — Z98.890 OTHER SPECIFIED POSTPROCEDURAL STATES: Chronic | ICD-10-CM

## 2025-01-25 DIAGNOSIS — Z00.8 ENCOUNTER FOR OTHER GENERAL EXAMINATION: ICD-10-CM

## 2025-01-25 DIAGNOSIS — Z96.21 COCHLEAR IMPLANT STATUS: Chronic | ICD-10-CM

## 2025-01-25 DIAGNOSIS — Z90.722 ACQUIRED ABSENCE OF OVARIES, BILATERAL: Chronic | ICD-10-CM

## 2025-01-25 DIAGNOSIS — Z98.51 TUBAL LIGATION STATUS: Chronic | ICD-10-CM

## 2025-01-25 PROCEDURE — 74177 CT ABD & PELVIS W/CONTRAST: CPT | Mod: 26

## 2025-01-25 PROCEDURE — 71260 CT THORAX DX C+: CPT | Mod: 26

## 2025-01-28 ENCOUNTER — RESULT REVIEW (OUTPATIENT)
Age: 70
End: 2025-01-28

## 2025-01-28 ENCOUNTER — TRANSCRIPTION ENCOUNTER (OUTPATIENT)
Age: 70
End: 2025-01-28

## 2025-01-28 ENCOUNTER — APPOINTMENT (OUTPATIENT)
Dept: HEMATOLOGY ONCOLOGY | Facility: CLINIC | Age: 70
End: 2025-01-28

## 2025-01-28 ENCOUNTER — APPOINTMENT (OUTPATIENT)
Dept: GYNECOLOGIC ONCOLOGY | Facility: CLINIC | Age: 70
End: 2025-01-28
Payer: MEDICARE

## 2025-01-28 VITALS
HEART RATE: 59 BPM | TEMPERATURE: 98.6 F | BODY MASS INDEX: 23.57 KG/M2 | HEIGHT: 63 IN | SYSTOLIC BLOOD PRESSURE: 138 MMHG | WEIGHT: 133.03 LBS | OXYGEN SATURATION: 97 % | DIASTOLIC BLOOD PRESSURE: 73 MMHG

## 2025-01-28 DIAGNOSIS — Z15.01 GENETIC SUSCEPTIBILITY TO MALIGNANT NEOPLASM OF BREAST: ICD-10-CM

## 2025-01-28 DIAGNOSIS — Z15.09 GENETIC SUSCEPTIBILITY TO MALIGNANT NEOPLASM OF BREAST: ICD-10-CM

## 2025-01-28 DIAGNOSIS — K59.00 CONSTIPATION, UNSPECIFIED: ICD-10-CM

## 2025-01-28 DIAGNOSIS — C48.2 MALIGNANT NEOPLASM OF PERITONEUM, UNSPECIFIED: ICD-10-CM

## 2025-01-28 DIAGNOSIS — M25.50 PAIN IN UNSPECIFIED JOINT: ICD-10-CM

## 2025-01-28 LAB
BASOPHILS # BLD AUTO: 0 K/UL — SIGNIFICANT CHANGE UP (ref 0–0.2)
BASOPHILS NFR BLD AUTO: 0.8 % — SIGNIFICANT CHANGE UP (ref 0–2)
EOSINOPHIL # BLD AUTO: 0.1 K/UL — SIGNIFICANT CHANGE UP (ref 0–0.5)
EOSINOPHIL NFR BLD AUTO: 1.2 % — SIGNIFICANT CHANGE UP (ref 0–6)
HCT VFR BLD CALC: 41.4 % — SIGNIFICANT CHANGE UP (ref 34.5–45)
HGB BLD-MCNC: 13.8 G/DL — SIGNIFICANT CHANGE UP (ref 11.5–15.5)
LYMPHOCYTES # BLD AUTO: 1.2 K/UL — SIGNIFICANT CHANGE UP (ref 1–3.3)
LYMPHOCYTES # BLD AUTO: 23.1 % — SIGNIFICANT CHANGE UP (ref 13–44)
MCHC RBC-ENTMCNC: 32.2 PG — SIGNIFICANT CHANGE UP (ref 27–34)
MCHC RBC-ENTMCNC: 33.5 G/DL — SIGNIFICANT CHANGE UP (ref 32–36)
MCV RBC AUTO: 96.3 FL — SIGNIFICANT CHANGE UP (ref 80–100)
MONOCYTES # BLD AUTO: 0.4 K/UL — SIGNIFICANT CHANGE UP (ref 0–0.9)
MONOCYTES NFR BLD AUTO: 8 % — SIGNIFICANT CHANGE UP (ref 2–14)
NEUTROPHILS # BLD AUTO: 3.5 K/UL — SIGNIFICANT CHANGE UP (ref 1.8–7.4)
NEUTROPHILS NFR BLD AUTO: 66.8 % — SIGNIFICANT CHANGE UP (ref 43–77)
PLATELET # BLD AUTO: 185 K/UL — SIGNIFICANT CHANGE UP (ref 150–400)
RBC # BLD: 4.3 M/UL — SIGNIFICANT CHANGE UP (ref 3.8–5.2)
RBC # FLD: 12.3 % — SIGNIFICANT CHANGE UP (ref 10.3–14.5)
WBC # BLD: 5.3 K/UL — SIGNIFICANT CHANGE UP (ref 3.8–10.5)
WBC # FLD AUTO: 5.3 K/UL — SIGNIFICANT CHANGE UP (ref 3.8–10.5)

## 2025-01-28 PROCEDURE — 99214 OFFICE O/P EST MOD 30 MIN: CPT

## 2025-01-30 LAB
ALBUMIN SERPL ELPH-MCNC: 4.3 G/DL
ALP BLD-CCNC: 89 U/L
ALT SERPL-CCNC: 18 U/L
ANION GAP SERPL CALC-SCNC: 9 MMOL/L
AST SERPL-CCNC: 21 U/L
BILIRUB SERPL-MCNC: 0.5 MG/DL
BUN SERPL-MCNC: 17 MG/DL
CALCIUM SERPL-MCNC: 9.8 MG/DL
CANCER AG125 SERPL-ACNC: 11 U/ML
CHLORIDE SERPL-SCNC: 103 MMOL/L
CO2 SERPL-SCNC: 31 MMOL/L
CREAT SERPL-MCNC: 0.64 MG/DL
EGFR: 96 ML/MIN/1.73M2
GLUCOSE SERPL-MCNC: 103 MG/DL
MAGNESIUM SERPL-MCNC: 1.8 MG/DL
POTASSIUM SERPL-SCNC: 4.7 MMOL/L
PROT SERPL-MCNC: 6.7 G/DL
SODIUM SERPL-SCNC: 142 MMOL/L

## 2025-02-06 ENCOUNTER — APPOINTMENT (OUTPATIENT)
Dept: DERMATOLOGY | Facility: CLINIC | Age: 70
End: 2025-02-06
Payer: MEDICARE

## 2025-02-06 PROCEDURE — 99213 OFFICE O/P EST LOW 20 MIN: CPT | Mod: 25

## 2025-02-06 PROCEDURE — 10060 I&D ABSCESS SIMPLE/SINGLE: CPT

## 2025-02-18 ENCOUNTER — TRANSCRIPTION ENCOUNTER (OUTPATIENT)
Age: 70
End: 2025-02-18

## 2025-02-19 ENCOUNTER — TRANSCRIPTION ENCOUNTER (OUTPATIENT)
Age: 70
End: 2025-02-19

## 2025-03-06 ENCOUNTER — APPOINTMENT (OUTPATIENT)
Dept: GYNECOLOGIC ONCOLOGY | Facility: CLINIC | Age: 70
End: 2025-03-06

## 2025-03-06 VITALS
DIASTOLIC BLOOD PRESSURE: 74 MMHG | WEIGHT: 132 LBS | BODY MASS INDEX: 23.39 KG/M2 | OXYGEN SATURATION: 96 % | SYSTOLIC BLOOD PRESSURE: 144 MMHG | HEIGHT: 63 IN | HEART RATE: 62 BPM

## 2025-03-06 PROCEDURE — 99213 OFFICE O/P EST LOW 20 MIN: CPT

## 2025-03-06 PROCEDURE — G2211 COMPLEX E/M VISIT ADD ON: CPT

## 2025-03-06 PROCEDURE — 99459 PELVIC EXAMINATION: CPT

## 2025-03-27 NOTE — RESULTS/DATA
[FreeTextEntry1] : 3/24/23: CT Abdomen/pelvis \par IMPRESSION:\par No CT evidence of local tumor recurrence or metastatic disease in the chest, abdomen and pelvis\par \par 6/6/22 Debulking surgery\par Final Diagnosis\par \par 1. Omentum, partial Omentectomy ( 12 cm segment)\par \par - Omentum with adhesions and chronic inflammation\par - No atypia or malignancy identified\par \par 2. Uterus, corpus , hysterectomy\par - 35 Gram uterine corpus\par - Serosa: Adhesions and chronic inflammation\par - Cervix: Not seen, supra cervical specimen\par - Endometrium: Weakly proliferative with cystic atrophy. Endometrial polyp\par ( 1.0 cm)\par Negative for hyperplasia\par - Myometrium: Leiomyomas. Adenomyosis.\par - Negative for atypia or malignancy\par \par 3. Gastro colic ligament, excision\par - Fibrofatty tissue with scar, chronic inflammation, foreign body\par granuloma reaction\par - Negative for atypia or malignancy\par \par 5/5/22 Chest/ab/pelvis\par IMPRESSION:\par No gross evidence for carcinomatosis. 2.3 x 1.2 cm fluid density in the expected location of left adnexa this patient status post bilateral salpingo-oophorectomy. This may represent a small seroma or lymphocele. Minimal hazy soft tissue stranding in the supra colic omentum at site of previously visualized omental implant.\par \par 3/5/22 Chest CT \par IMPRESSION:\par Stable 3 mm right middle lobe nodule.\par \par Slightly larger right cardiophrenic lymphadenopathy. Stable left supradiaphragmatic lymph nodes.\par \par Partially visualized peritoneal nodularity in the left upper quadrant.\par \par \par Pathology 2/25/22 \par Accession:                             [id number]\par \par Collected Date/Time:                   2/25/2022 12:15 EST\par Received Date/Time:                    2/25/2022 12:15 EST\par \par Surgical Pathology Consultation Report - Auth (Verified)\par \par Specimen(s) Submitted\par \par 1. Slide Consult- Right omental nodule biopsy (11 slides, 33-E-,\par 1A1, PAX8, PAX8, CK7, CK7, WT1, P16, P53, WT1, P53)\par \par Final Diagnosis\par Submitting Institution:  [facility]\par \par \par Date of Procedure:  2/16/22\par \par Right omental nodule biopsy\par - Compatible with high grade serous carcinoma of adnexal or peritoneal\par origin, see note\par \par Note: In submitted immunostains, tumor cells are positive for CK7, PAX8,\par WT-1 and p16(diffuse). Tumor cells show aberrant expression(over -\par expression) for p53.\par \par 1/26/22 CT abdomen/pelvis \par multi lobular soft tissue masses involving the omentum most pronounced right side adjacent to the ascending colon and cecum. Additional nodularity adjacent to the tip of the cecum involving the peritoneum. 2.9 cm left hemipelvic cyst. Findings suspicious for ovarian neoplasia. \par  independent

## 2025-05-20 ENCOUNTER — TRANSCRIPTION ENCOUNTER (OUTPATIENT)
Age: 70
End: 2025-05-20

## 2025-05-23 ENCOUNTER — OUTPATIENT (OUTPATIENT)
Dept: OUTPATIENT SERVICES | Facility: HOSPITAL | Age: 70
LOS: 1 days | Discharge: HOME | End: 2025-05-23

## 2025-05-23 DIAGNOSIS — Z96.21 COCHLEAR IMPLANT STATUS: Chronic | ICD-10-CM

## 2025-05-23 DIAGNOSIS — Z98.51 TUBAL LIGATION STATUS: Chronic | ICD-10-CM

## 2025-05-23 DIAGNOSIS — Z90.722 ACQUIRED ABSENCE OF OVARIES, BILATERAL: Chronic | ICD-10-CM

## 2025-05-23 DIAGNOSIS — C48.2 MALIGNANT NEOPLASM OF PERITONEUM, UNSPECIFIED: ICD-10-CM

## 2025-05-23 DIAGNOSIS — Z98.890 OTHER SPECIFIED POSTPROCEDURAL STATES: Chronic | ICD-10-CM

## 2025-05-27 ENCOUNTER — APPOINTMENT (OUTPATIENT)
Dept: HEMATOLOGY ONCOLOGY | Facility: CLINIC | Age: 70
End: 2025-05-27

## 2025-05-27 ENCOUNTER — APPOINTMENT (OUTPATIENT)
Dept: GYNECOLOGIC ONCOLOGY | Facility: CLINIC | Age: 70
End: 2025-05-27
Payer: MEDICARE

## 2025-05-27 ENCOUNTER — RESULT REVIEW (OUTPATIENT)
Age: 70
End: 2025-05-27

## 2025-05-27 VITALS
HEART RATE: 60 BPM | DIASTOLIC BLOOD PRESSURE: 67 MMHG | TEMPERATURE: 97.9 F | WEIGHT: 133.01 LBS | HEIGHT: 63 IN | SYSTOLIC BLOOD PRESSURE: 119 MMHG | BODY MASS INDEX: 23.57 KG/M2 | OXYGEN SATURATION: 97 %

## 2025-05-27 DIAGNOSIS — C48.2 MALIGNANT NEOPLASM OF PERITONEUM, UNSPECIFIED: ICD-10-CM

## 2025-05-27 DIAGNOSIS — M25.50 PAIN IN UNSPECIFIED JOINT: ICD-10-CM

## 2025-05-27 DIAGNOSIS — Z15.09 GENETIC SUSCEPTIBILITY TO MALIGNANT NEOPLASM OF BREAST: ICD-10-CM

## 2025-05-27 DIAGNOSIS — Z15.01 GENETIC SUSCEPTIBILITY TO MALIGNANT NEOPLASM OF BREAST: ICD-10-CM

## 2025-05-27 LAB
ALBUMIN SERPL ELPH-MCNC: 4.2 G/DL — SIGNIFICANT CHANGE UP (ref 3.3–5)
ALP SERPL-CCNC: 74 U/L — SIGNIFICANT CHANGE UP (ref 40–120)
ALT FLD-CCNC: 22 U/L — SIGNIFICANT CHANGE UP (ref 10–40)
ANION GAP SERPL CALC-SCNC: 14 MMOL/L — SIGNIFICANT CHANGE UP (ref 5–17)
AST SERPL-CCNC: 25 U/L — SIGNIFICANT CHANGE UP (ref 10–35)
BILIRUB SERPL-MCNC: 0.5 MG/DL — SIGNIFICANT CHANGE UP (ref 0.2–1.2)
BUN SERPL-MCNC: 20 MG/DL — SIGNIFICANT CHANGE UP (ref 7–23)
CALCIUM SERPL-MCNC: 9.2 MG/DL — SIGNIFICANT CHANGE UP (ref 8.4–10.5)
CHLORIDE SERPL-SCNC: 101 MMOL/L — SIGNIFICANT CHANGE UP (ref 96–108)
CO2 SERPL-SCNC: 26 MMOL/L — SIGNIFICANT CHANGE UP (ref 22–31)
CREAT SERPL-MCNC: 0.62 MG/DL — SIGNIFICANT CHANGE UP (ref 0.5–1.3)
EGFR: 97 ML/MIN/1.73M2 — SIGNIFICANT CHANGE UP
EGFR: 97 ML/MIN/1.73M2 — SIGNIFICANT CHANGE UP
GLUCOSE SERPL-MCNC: 101 MG/DL — HIGH (ref 70–99)
POTASSIUM SERPL-MCNC: 4.4 MMOL/L — SIGNIFICANT CHANGE UP (ref 3.5–5.3)
POTASSIUM SERPL-SCNC: 4.4 MMOL/L — SIGNIFICANT CHANGE UP (ref 3.5–5.3)
PROT SERPL-MCNC: 6.5 G/DL — SIGNIFICANT CHANGE UP (ref 6–8.3)
SODIUM SERPL-SCNC: 142 MMOL/L — SIGNIFICANT CHANGE UP (ref 135–145)

## 2025-05-27 PROCEDURE — G2211 COMPLEX E/M VISIT ADD ON: CPT

## 2025-05-27 PROCEDURE — 99213 OFFICE O/P EST LOW 20 MIN: CPT

## 2025-05-29 ENCOUNTER — TRANSCRIPTION ENCOUNTER (OUTPATIENT)
Age: 70
End: 2025-05-29

## 2025-05-29 LAB — CANCER AG125 SERPL-ACNC: 9 U/ML

## 2025-06-27 ENCOUNTER — TRANSCRIPTION ENCOUNTER (OUTPATIENT)
Age: 70
End: 2025-06-27

## 2025-07-21 ENCOUNTER — TRANSCRIPTION ENCOUNTER (OUTPATIENT)
Age: 70
End: 2025-07-21

## 2025-07-22 ENCOUNTER — TRANSCRIPTION ENCOUNTER (OUTPATIENT)
Age: 70
End: 2025-07-22

## 2025-07-22 DIAGNOSIS — C48.2 MALIGNANT NEOPLASM OF PERITONEUM, UNSPECIFIED: ICD-10-CM

## 2025-07-23 ENCOUNTER — APPOINTMENT (OUTPATIENT)
Dept: HEMATOLOGY ONCOLOGY | Facility: CLINIC | Age: 70
End: 2025-07-23

## 2025-07-23 ENCOUNTER — RESULT REVIEW (OUTPATIENT)
Age: 70
End: 2025-07-23

## 2025-07-24 LAB
ALBUMIN SERPL ELPH-MCNC: 4.1 G/DL
ALP BLD-CCNC: 88 U/L
ALT SERPL-CCNC: 22 U/L
ANION GAP SERPL CALC-SCNC: 14 MMOL/L
AST SERPL-CCNC: 23 U/L
BILIRUB SERPL-MCNC: 0.6 MG/DL
BUN SERPL-MCNC: 21 MG/DL
CALCIUM SERPL-MCNC: 9.2 MG/DL
CANCER AG125 SERPL-ACNC: 9 U/ML
CHLORIDE SERPL-SCNC: 105 MMOL/L
CO2 SERPL-SCNC: 26 MMOL/L
CREAT SERPL-MCNC: 0.7 MG/DL
EGFRCR SERPLBLD CKD-EPI 2021: 94 ML/MIN/1.73M2
GLUCOSE SERPL-MCNC: 100 MG/DL
POTASSIUM SERPL-SCNC: 4.8 MMOL/L
PROT SERPL-MCNC: 6.5 G/DL
SODIUM SERPL-SCNC: 145 MMOL/L

## 2025-08-19 ENCOUNTER — RESULT REVIEW (OUTPATIENT)
Age: 70
End: 2025-08-19

## 2025-08-19 ENCOUNTER — APPOINTMENT (OUTPATIENT)
Dept: DERMATOLOGY | Facility: CLINIC | Age: 70
End: 2025-08-19
Payer: MEDICARE

## 2025-08-19 PROCEDURE — 17000 DESTRUCT PREMALG LESION: CPT | Mod: 59

## 2025-08-19 PROCEDURE — 11102 TANGNTL BX SKIN SINGLE LES: CPT

## 2025-08-19 PROCEDURE — 99214 OFFICE O/P EST MOD 30 MIN: CPT | Mod: 25

## 2025-09-08 DIAGNOSIS — C48.2 MALIGNANT NEOPLASM OF PERITONEUM, UNSPECIFIED: ICD-10-CM

## 2025-09-09 ENCOUNTER — APPOINTMENT (OUTPATIENT)
Dept: HEMATOLOGY ONCOLOGY | Facility: CLINIC | Age: 70
End: 2025-09-09

## 2025-09-09 ENCOUNTER — RESULT REVIEW (OUTPATIENT)
Age: 70
End: 2025-09-09

## 2025-09-09 ENCOUNTER — APPOINTMENT (OUTPATIENT)
Dept: GYNECOLOGIC ONCOLOGY | Facility: CLINIC | Age: 70
End: 2025-09-09
Payer: MEDICARE

## 2025-09-09 VITALS
HEART RATE: 62 BPM | HEIGHT: 63 IN | SYSTOLIC BLOOD PRESSURE: 134 MMHG | DIASTOLIC BLOOD PRESSURE: 75 MMHG | WEIGHT: 132.06 LBS | TEMPERATURE: 98.1 F | BODY MASS INDEX: 23.4 KG/M2 | OXYGEN SATURATION: 96 %

## 2025-09-09 DIAGNOSIS — M79.2 NEURALGIA AND NEURITIS, UNSPECIFIED: ICD-10-CM

## 2025-09-09 DIAGNOSIS — Z15.09 GENETIC SUSCEPTIBILITY TO MALIGNANT NEOPLASM OF BREAST: ICD-10-CM

## 2025-09-09 DIAGNOSIS — Z15.01 GENETIC SUSCEPTIBILITY TO MALIGNANT NEOPLASM OF BREAST: ICD-10-CM

## 2025-09-09 DIAGNOSIS — R41.89 OTHER SYMPTOMS AND SIGNS INVOLVING COGNITIVE FUNCTIONS AND AWARENESS: ICD-10-CM

## 2025-09-09 DIAGNOSIS — Z86.69 PERSONAL HISTORY OF OTHER DISEASES OF THE NERVOUS SYSTEM AND SENSE ORGANS: ICD-10-CM

## 2025-09-09 DIAGNOSIS — Z92.89 PERSONAL HISTORY OF OTHER MEDICAL TREATMENT: ICD-10-CM

## 2025-09-09 DIAGNOSIS — Z01.818 ENCOUNTER FOR OTHER PREPROCEDURAL EXAMINATION: ICD-10-CM

## 2025-09-09 PROCEDURE — 99213 OFFICE O/P EST LOW 20 MIN: CPT

## 2025-09-09 PROCEDURE — G2211 COMPLEX E/M VISIT ADD ON: CPT

## 2025-09-10 LAB
ALBUMIN SERPL ELPH-MCNC: 4.4 G/DL
ALP BLD-CCNC: 83 U/L
ALT SERPL-CCNC: 23 U/L
ANION GAP SERPL CALC-SCNC: 12 MMOL/L
AST SERPL-CCNC: 26 U/L
BILIRUB SERPL-MCNC: 0.6 MG/DL
BUN SERPL-MCNC: 15 MG/DL
CALCIUM SERPL-MCNC: 9.3 MG/DL
CANCER AG125 SERPL-ACNC: 10 U/ML
CHLORIDE SERPL-SCNC: 101 MMOL/L
CO2 SERPL-SCNC: 27 MMOL/L
CREAT SERPL-MCNC: 0.65 MG/DL
EGFRCR SERPLBLD CKD-EPI 2021: 95 ML/MIN/1.73M2
GLUCOSE SERPL-MCNC: 108 MG/DL
POTASSIUM SERPL-SCNC: 4.7 MMOL/L
PROT SERPL-MCNC: 6.6 G/DL
SODIUM SERPL-SCNC: 139 MMOL/L
TSH SERPL-ACNC: 1.32 UIU/ML

## 2025-09-12 ENCOUNTER — TRANSCRIPTION ENCOUNTER (OUTPATIENT)
Age: 70
End: 2025-09-12

## 2025-09-17 ENCOUNTER — APPOINTMENT (OUTPATIENT)
Dept: DERMATOLOGY | Facility: CLINIC | Age: 70
End: 2025-09-17
Payer: MEDICARE

## 2025-09-17 PROCEDURE — 99214 OFFICE O/P EST MOD 30 MIN: CPT | Mod: 25

## 2025-09-17 PROCEDURE — 17262 DSTRJ MAL LES T/A/L 1.1-2.0: CPT

## (undated) DEVICE — DRSG PREVENA PEEL & PLACE KIT 13CM

## (undated) DEVICE — KIT PROCEDURE LAVAGE DISPOSABLE

## (undated) DEVICE — PACK MAJOR ABDOMINAL WITH LAP

## (undated) DEVICE — SUT PDS II 0 27" OS-6

## (undated) DEVICE — GOWN LG

## (undated) DEVICE — Device

## (undated) DEVICE — DRAPE IOBAN 23X33"

## (undated) DEVICE — DRAIN RESERVOIR FOR JACKSON PRATT 100CC CARDINAL

## (undated) DEVICE — WRAP COMPRESSION CALF BARIATRIC

## (undated) DEVICE — GLV 7.5 PROTEXIS

## (undated) DEVICE — STAPLER SKIN PROXIMATE

## (undated) DEVICE — FOLEY TRAY 14FR 5CC LTX UMETER CLOSED

## (undated) DEVICE — DRAPE TOWEL WHITE 17" X 24"

## (undated) DEVICE — KIT SPILL CHEMO

## (undated) DEVICE — GOWN XL

## (undated) DEVICE — SUT PDS II 1 48" TP-1

## (undated) DEVICE — LIGASURE IMPACT

## (undated) DEVICE — DRAIN JACKSON PRATT 10MM FLAT FULL 15FR TROCAR

## (undated) DEVICE — BLANKET WARMER UPPER ADULT